# Patient Record
Sex: FEMALE | Race: BLACK OR AFRICAN AMERICAN | NOT HISPANIC OR LATINO | Employment: STUDENT | URBAN - METROPOLITAN AREA
[De-identification: names, ages, dates, MRNs, and addresses within clinical notes are randomized per-mention and may not be internally consistent; named-entity substitution may affect disease eponyms.]

---

## 2020-08-21 ENCOUNTER — TELEPHONE (OUTPATIENT)
Dept: PEDIATRIC GASTROENTEROLOGY | Facility: CLINIC | Age: 20
End: 2020-08-21

## 2020-08-21 ENCOUNTER — OFFICE VISIT (OUTPATIENT)
Dept: PEDIATRICS | Facility: CLINIC | Age: 20
End: 2020-08-21
Payer: MEDICAID

## 2020-08-21 VITALS
TEMPERATURE: 98 F | HEART RATE: 81 BPM | RESPIRATION RATE: 18 BRPM | HEIGHT: 65 IN | WEIGHT: 135 LBS | OXYGEN SATURATION: 99 % | DIASTOLIC BLOOD PRESSURE: 60 MMHG | BODY MASS INDEX: 22.49 KG/M2 | SYSTOLIC BLOOD PRESSURE: 102 MMHG

## 2020-08-21 DIAGNOSIS — K21.9 GASTROESOPHAGEAL REFLUX DISEASE WITHOUT ESOPHAGITIS: ICD-10-CM

## 2020-08-21 DIAGNOSIS — Z00.00 ANNUAL PHYSICAL EXAM: ICD-10-CM

## 2020-08-21 DIAGNOSIS — F84.0 AUTISM: ICD-10-CM

## 2020-08-21 DIAGNOSIS — K59.09 CHRONIC CONSTIPATION: ICD-10-CM

## 2020-08-21 PROCEDURE — 99385 PR PREVENTIVE VISIT,NEW,18-39: ICD-10-PCS | Mod: S$GLB,,, | Performed by: INTERNAL MEDICINE

## 2020-08-21 PROCEDURE — 99385 PREV VISIT NEW AGE 18-39: CPT | Mod: S$GLB,,, | Performed by: INTERNAL MEDICINE

## 2020-08-21 RX ORDER — TRIPROLIDINE/PSEUDOEPHEDRINE 2.5MG-60MG
15 TABLET ORAL EVERY 6 HOURS PRN
COMMUNITY

## 2020-08-21 RX ORDER — POLYETHYLENE GLYCOL 3350 17 G/17G
17 POWDER, FOR SOLUTION ORAL
COMMUNITY

## 2020-08-21 RX ORDER — DIPHENHYDRAMINE HCL 12.5MG/5ML
LIQUID (ML) ORAL 4 TIMES DAILY PRN
COMMUNITY

## 2020-08-21 RX ORDER — OMEPRAZOLE 20 MG/1
20 CAPSULE, DELAYED RELEASE ORAL DAILY
COMMUNITY
End: 2020-08-21 | Stop reason: SDUPTHER

## 2020-08-21 RX ORDER — OMEPRAZOLE 20 MG/1
20 CAPSULE, DELAYED RELEASE ORAL DAILY
Qty: 30 CAPSULE | Refills: 11 | Status: SHIPPED | OUTPATIENT
Start: 2020-08-21 | End: 2022-08-26

## 2020-08-21 NOTE — PROGRESS NOTES
Well Adolescent Visit    Chief Complaint   Patient presents with    Well Child       19-year-old young woman with a history of autism, GERD, recurrent vomiting here to establish care and for annual physical.  Patient has previous pediatrician retired.  Patient was diagnosed with autism at around 3 years of age.  When she was younger, she received speech therapy, occupational therapy, behavioral therapy through school.  She is currently in college pursuing a degree in graphic design.    Over the years, she has had issues with loud noises, this has improved over time and patient knows to carry a pair of noise cancelling headphones with her in case she has and allowed place.  She also does not do well with any major life changes or changes to her routine.  During these times, she becomes anxious and will often have episodes of vomiting.  A few years ago, mom noted that patient complained of GERD like symptoms even between episodes so she started treating her with omeprazole.  She does not take it daily but she will take it whenever she is having 1 of these episodes with frequent vomiting.  Recently, mom had surgery and dad, who is a , was home more than usual.  Likely due to these changes, patient started having issues with frequent vomiting again.  Mom has not been giving her omeprazole daily.    Well Child  Associated symptoms include vomiting. Pertinent negatives include no abdominal pain, arthralgias, chest pain, congestion, coughing, fatigue, headaches, joint swelling, nausea, rash, sore throat or weakness.         There is no immunization history on file for this patient.    Past Medical History:   Diagnosis Date    History of speech therapy        Family History   Problem Relation Age of Onset    No Known Problems Mother     No Known Problems Father        Social History     Socioeconomic History    Marital status: Single     Spouse name: Not on file    Number of children: Not on file     Years of education: Not on file    Highest education level: Not on file   Occupational History    Not on file   Social Needs    Financial resource strain: Not on file    Food insecurity     Worry: Not on file     Inability: Not on file    Transportation needs     Medical: Not on file     Non-medical: Not on file   Tobacco Use    Smoking status: Never Smoker    Smokeless tobacco: Never Used   Substance and Sexual Activity    Alcohol use: Not on file    Drug use: Not on file    Sexual activity: Not on file   Lifestyle    Physical activity     Days per week: Not on file     Minutes per session: Not on file    Stress: Not on file   Relationships    Social connections     Talks on phone: Not on file     Gets together: Not on file     Attends Orthodox service: Not on file     Active member of club or organization: Not on file     Attends meetings of clubs or organizations: Not on file     Relationship status: Not on file   Other Topics Concern    Not on file   Social History Narrative    Not on file       Review of Systems   Constitutional: Positive for appetite change. Negative for activity change, fatigue and unexpected weight change.   HENT: Negative for congestion, ear pain, rhinorrhea, sinus pressure, sinus pain, sore throat and trouble swallowing.    Eyes: Negative for pain, redness and visual disturbance.   Respiratory: Negative for cough, chest tightness, shortness of breath and wheezing.    Cardiovascular: Negative for chest pain and palpitations.   Gastrointestinal: Positive for vomiting. Negative for abdominal pain, constipation, diarrhea and nausea.   Genitourinary: Negative for difficulty urinating, dysuria, flank pain, frequency, pelvic pain, vaginal bleeding and vaginal discharge.   Musculoskeletal: Negative for arthralgias and joint swelling.   Skin: Negative for rash.   Neurological: Negative for dizziness, seizures, syncope, weakness and headaches.   Psychiatric/Behavioral: Negative for  "confusion, dysphoric mood, sleep disturbance and suicidal ideas. The patient is nervous/anxious.        Vitals:    08/21/20 1354   BP: 102/60   Pulse: 81   Resp: 18   Temp: 97.6 °F (36.4 °C)   TempSrc: Temporal   SpO2: 99%   Weight: 61.2 kg (135 lb)   Height: 5' 5" (1.651 m)       Percentiles:   62 %ile (Z= 0.30) based on CDC (Girls, 2-20 Years) weight-for-age data using vitals from 8/21/2020.   61 %ile (Z= 0.27) based on CDC (Girls, 2-20 Years) Stature-for-age data based on Stature recorded on 8/21/2020.   No previous contact with both weight and height data on file.   Blood pressure percentiles are not available for patients who are 18 years or older.        Physical Exam  Constitutional:       General: She is not in acute distress.     Appearance: She is well-developed.   HENT:      Head: Normocephalic and atraumatic.      Nose: Nose normal.   Eyes:      Conjunctiva/sclera: Conjunctivae normal.      Pupils: Pupils are equal, round, and reactive to light.   Neck:      Musculoskeletal: Normal range of motion and neck supple.      Thyroid: No thyromegaly.   Cardiovascular:      Rate and Rhythm: Normal rate and regular rhythm.      Heart sounds: Normal heart sounds. No murmur.   Pulmonary:      Effort: Pulmonary effort is normal.      Breath sounds: Normal breath sounds.   Abdominal:      General: Bowel sounds are normal. There is no distension.      Palpations: Abdomen is soft. There is no mass.      Tenderness: There is no abdominal tenderness. There is no guarding.   Lymphadenopathy:      Cervical: No cervical adenopathy.   Skin:     General: Skin is warm and dry.   Neurological:      Mental Status: She is alert and oriented to person, place, and time.         Assessment/Plan:    Arti is a 19 y.o. here for well adolescent visit.  Growth and development are not within normal limits.  Concerns addressed an anticipatory guidance given as below.      Problem List Items Addressed This Visit        Neuro    Autism    " Relevant Orders    Ambulatory referral/consult to Pediatric Gastroenterology       GI    Gastroesophageal reflux disease without esophagitis    Current Assessment & Plan     Suspect patient has chronic GERD as well as vomiting episodes that are more behavioral in nature.  I have advised daily PPI and I am referring to GI for EGD to evaluate for any esophageal damage due to chronic acid reflux.         Relevant Medications    omeprazole (PRILOSEC) 20 MG capsule    Other Relevant Orders    Ambulatory referral/consult to Pediatric Gastroenterology    Chronic constipation    Current Assessment & Plan     Well controlled with MiraLax.         Relevant Medications    polyethylene glycol (GLYCOLAX) 17 gram/dose powder       Other    Annual physical exam          Anticipatory guidance:  Good nutrition/Exercise  Dental/Flossing/Self care  Drowning/Sun safety  Seat belt/Auto safety  Sport bike/Helmet use  Sports/Injury prevention  Violence prevention/Gun safety  Passive smoke  Parenting advice  Safe at home  Sexual education/Counseling  Education goals/Activities  Limit TV/Internet use  Tobacco/Alcohol/Drugs/Inhalants  Peer refusal skills/Gangs  Social interaction  Family functioning  Self control  Depression/Anxiety  Conflict resolution skills

## 2020-08-21 NOTE — TELEPHONE ENCOUNTER
Referral received from Dr. Angel for pt to see peds GI, but pt is a 18 y/o New Patient which would warrant establishing care with adult GI.  Called parents to discuss, no answer, LVM.

## 2020-08-22 PROBLEM — K59.09 CHRONIC CONSTIPATION: Status: ACTIVE | Noted: 2020-08-22

## 2020-08-22 NOTE — ASSESSMENT & PLAN NOTE
Suspect patient has chronic GERD as well as vomiting episodes that are more behavioral in nature.  I have advised daily PPI and I am referring to GI for EGD to evaluate for any esophageal damage due to chronic acid reflux.

## 2020-09-14 ENCOUNTER — TELEPHONE (OUTPATIENT)
Dept: PEDIATRICS | Facility: CLINIC | Age: 20
End: 2020-09-14

## 2020-09-14 DIAGNOSIS — K59.09 CHRONIC CONSTIPATION: ICD-10-CM

## 2020-09-14 DIAGNOSIS — K21.9 GASTROESOPHAGEAL REFLUX DISEASE WITHOUT ESOPHAGITIS: Primary | ICD-10-CM

## 2020-09-14 NOTE — TELEPHONE ENCOUNTER
Please let mom know that Dr. Menjivar would prefer to have Alanna see an adult GI doctor given her age, but if mom really wants to see pediatric GI he will see her once to get things started.  Mom had also asked me if I had put in a referral to Dr. Menjivar for her other daughter and I did.     Mom notified. Mom wants to know who do you want her to see and can you put in a referral?

## 2020-09-14 NOTE — TELEPHONE ENCOUNTER
----- Message from Danielle Angel MD sent at 9/14/2020 12:12 PM CDT -----  Regarding: FW: referral  Please let mom know that Dr. Menjivar would prefer to have Alanna see an adult GI doctor given her age, but if mom really wants to see pediatric GI he will see her once to get things started.  Mom had also asked me if I had put in a referral to Dr. Menjivar for her other daughter and I did.   ----- Message -----  From: Jalen Menjivar MD  Sent: 9/9/2020   5:06 PM CDT  To: Enedelia Kurtz, RN, Danielle Angel MD  Subject: RE: referral                                     The main issue is this is not likely to be a 1 time visit an almost 20-year-old patient-though delayed and with Autism.  I think it would be better to establish with an adult provider since I would need to transition soon anyway if possible.  I can certainly see with that understanding since that may help to get things rolling.  I do not have a specific provider to transition afterwards.  Thanks  ----- Message -----  From: Danielle Angel MD  Sent: 9/9/2020   4:56 PM CDT  To: Jalen Menjivar MD, Enedelia Kurtz, RN  Subject: referral                                         Dr. Menjivar,   I have referred this patient to you even though she is 19 for 2 reasons- First, she is autistic and her GI issues are typical for autistic patients (constipation, vomiting that is related to anxiety). Also I referred her younger sister to you so mom was hoping both girls could see the same GI doctor.  Please let me know if you would prefer that I refer her to an adult provider, or if you have a PD GI colleague who you think would be a good fit for this patient.    Thanks!    Danielle Angel

## 2020-10-09 DIAGNOSIS — E73.9 LACTOSE INTOLERANCE: ICD-10-CM

## 2020-10-09 DIAGNOSIS — K21.9 GERD (GASTROESOPHAGEAL REFLUX DISEASE): Primary | ICD-10-CM

## 2020-10-09 DIAGNOSIS — E74.39: ICD-10-CM

## 2020-10-09 DIAGNOSIS — R14.1 GAS PAIN: ICD-10-CM

## 2020-10-09 DIAGNOSIS — R11.2 NAUSEA WITH VOMITING: ICD-10-CM

## 2020-10-15 ENCOUNTER — CLINICAL SUPPORT (OUTPATIENT)
Dept: PEDIATRICS | Facility: CLINIC | Age: 20
End: 2020-10-15
Payer: MEDICAID

## 2020-10-15 VITALS — TEMPERATURE: 98 F

## 2020-10-15 PROCEDURE — 90471 IMMUNIZATION ADMIN: CPT | Mod: S$GLB,VFC,, | Performed by: PEDIATRICS

## 2020-10-15 PROCEDURE — 90686 FLU VACCINE (QUAD) GREATER THAN OR EQUAL TO 3YO PRESERVATIVE FREE IM: ICD-10-PCS | Mod: SL,S$GLB,, | Performed by: PEDIATRICS

## 2020-10-15 PROCEDURE — 90686 IIV4 VACC NO PRSV 0.5 ML IM: CPT | Mod: SL,S$GLB,, | Performed by: PEDIATRICS

## 2020-10-15 PROCEDURE — 90471 FLU VACCINE (QUAD) GREATER THAN OR EQUAL TO 3YO PRESERVATIVE FREE IM: ICD-10-PCS | Mod: S$GLB,VFC,, | Performed by: PEDIATRICS

## 2020-11-20 ENCOUNTER — OFFICE VISIT (OUTPATIENT)
Dept: PEDIATRICS | Facility: CLINIC | Age: 20
End: 2020-11-20
Payer: MEDICAID

## 2020-11-20 VITALS
WEIGHT: 132.38 LBS | HEART RATE: 80 BPM | SYSTOLIC BLOOD PRESSURE: 96 MMHG | OXYGEN SATURATION: 99 % | RESPIRATION RATE: 20 BRPM | HEIGHT: 65 IN | BODY MASS INDEX: 22.06 KG/M2 | DIASTOLIC BLOOD PRESSURE: 52 MMHG

## 2020-11-20 DIAGNOSIS — L70.9 ACNE, UNSPECIFIED ACNE TYPE: ICD-10-CM

## 2020-11-20 DIAGNOSIS — K29.30 SUPERFICIAL GASTRITIS WITHOUT HEMORRHAGE, UNSPECIFIED CHRONICITY: ICD-10-CM

## 2020-11-20 PROCEDURE — 99214 PR OFFICE/OUTPT VISIT, EST, LEVL IV, 30-39 MIN: ICD-10-PCS | Mod: S$GLB,,, | Performed by: INTERNAL MEDICINE

## 2020-11-20 PROCEDURE — 99214 OFFICE O/P EST MOD 30 MIN: CPT | Mod: S$GLB,,, | Performed by: INTERNAL MEDICINE

## 2020-11-20 RX ORDER — CHOLECALCIFEROL (VITAMIN D3) 125 MCG
1 CAPSULE ORAL
COMMUNITY

## 2020-11-20 NOTE — PROGRESS NOTES
ADULT FOLLOW-UP VISIT    Subjective:     Chief Complaint:  Follow-up      20-year-old young woman with history of autism here for follow-up of recurrent episodes of vomiting.  Patient was seen by GI, last week she had an EGD which showed gastritis.  Pathology is still pending.  Esophagus was normal.  Patient reports improvement in her symptoms on omeprazole.  She has an upcoming follow-up with her GI doctor for full results.  Mom would like me to take look at patient's skin.  She suffers with acne on her face, chest, back.        Ms. Angel  has a past medical history of History of speech therapy.    Family history and social history are reviewed and there are no significant changes.     ROS:  Review of Systems   Constitutional: Negative for activity change, appetite change, fatigue and unexpected weight change.   HENT: Negative for congestion, ear pain, rhinorrhea, sinus pressure, sinus pain, sore throat and trouble swallowing.    Respiratory: Negative for cough, chest tightness, shortness of breath and wheezing.    Cardiovascular: Negative for chest pain and palpitations.   Gastrointestinal: Negative for abdominal pain, constipation, diarrhea, nausea and vomiting.   Genitourinary: Negative for dysuria.   Skin: Positive for rash.   Neurological: Negative for dizziness, seizures, syncope, weakness and headaches.   Psychiatric/Behavioral: Negative for dysphoric mood and suicidal ideas. The patient is not nervous/anxious.           Current Outpatient Medications:     lactase (LACTAID) 3,000 unit tablet, Take 1 tablet by mouth 3 (three) times daily with meals., Disp: , Rfl:     Lactobacillus acidophilus (PROBIOTIC ACIDOPHILUS ORAL), Take by mouth., Disp: , Rfl:     multivitamin with minerals tablet, Take 1 tablet by mouth once daily., Disp: , Rfl:     omeprazole (PRILOSEC) 20 MG capsule, Take 1 capsule (20 mg total) by mouth once daily., Disp: 30 capsule, Rfl: 11     "polyethylene glycol (GLYCOLAX) 17 gram/dose powder, Take 17 g by mouth once daily., Disp: , Rfl:     diphenhydrAMINE (BENYLIN) 12.5 mg/5 mL liquid, Take by mouth 4 (four) times daily as needed for Allergies., Disp: , Rfl:     ibuprofen (ADVIL,MOTRIN) 100 mg/5 mL suspension, Take 15 mLs by mouth every 6 (six) hours as needed for Temperature greater than., Disp: , Rfl:       Objective:     Physical Examination:     BP (!) 96/52   Pulse 80   Resp 20   Ht 5' 5" (1.651 m)   Wt 60 kg (132 lb 6 oz)   SpO2 99%   BMI 22.03 kg/m²     Physical Exam  Constitutional:       General: She is not in acute distress.     Appearance: She is well-developed. She is not diaphoretic.   HENT:      Right Ear: External ear normal.      Left Ear: External ear normal.      Nose: Nose normal.   Eyes:      General:         Right eye: No discharge.         Left eye: No discharge.      Conjunctiva/sclera: Conjunctivae normal.      Pupils: Pupils are equal, round, and reactive to light.   Cardiovascular:      Rate and Rhythm: Normal rate and regular rhythm.      Heart sounds: Normal heart sounds. No murmur.   Pulmonary:      Effort: Pulmonary effort is normal. No respiratory distress.      Breath sounds: Normal breath sounds. No wheezing or rales.   Abdominal:      General: Abdomen is flat. Bowel sounds are normal. There is no distension.      Tenderness: There is no abdominal tenderness.   Skin:     Comments: Acne on face, chest, back, arms   Neurological:      Mental Status: She is alert and oriented to person, place, and time.         Assessment/Plan:   Arti is a 20 y.o. female here for follow-up.    Problem List Items Addressed This Visit        Derm    Acne    Current Assessment & Plan     Handout given on initial management. Referred to derm as pt will likely need systemic therapy for widespread acne.          Relevant Orders    Ambulatory referral/consult to Dermatology       GI    Superficial gastritis without hemorrhage    Current " Assessment & Plan     Continue omeprazole. F/u with GI for path, H pylori results.                Health Maintenance   Topic Date Due    Hepatitis C Screening  2000    Lipid Panel  2000    HPV Vaccines (1 - 2-dose series) 09/24/2011    TETANUS VACCINE  09/24/2018           Discussion:     No follow-ups on file.    Goals    None         Electronically signed by Danielle Angel

## 2020-11-20 NOTE — ASSESSMENT & PLAN NOTE
Handout given on initial management. Referred to derm as pt will likely need systemic therapy for widespread acne.

## 2020-11-23 PROBLEM — Z00.00 ANNUAL PHYSICAL EXAM: Status: RESOLVED | Noted: 2020-08-21 | Resolved: 2020-11-23

## 2021-07-14 ENCOUNTER — OFFICE VISIT (OUTPATIENT)
Dept: PEDIATRICS | Facility: CLINIC | Age: 21
End: 2021-07-14
Payer: MEDICAID

## 2021-07-14 VITALS
DIASTOLIC BLOOD PRESSURE: 62 MMHG | OXYGEN SATURATION: 98 % | WEIGHT: 134 LBS | HEIGHT: 65 IN | TEMPERATURE: 98 F | RESPIRATION RATE: 18 BRPM | SYSTOLIC BLOOD PRESSURE: 98 MMHG | HEART RATE: 65 BPM | BODY MASS INDEX: 22.33 KG/M2

## 2021-07-14 DIAGNOSIS — Z02.89 OTHER GENERAL MEDICAL EXAMINATION FOR ADMINISTRATIVE PURPOSES: ICD-10-CM

## 2021-07-14 DIAGNOSIS — K21.9 GASTROESOPHAGEAL REFLUX DISEASE WITHOUT ESOPHAGITIS: ICD-10-CM

## 2021-07-14 DIAGNOSIS — L70.9 ACNE, UNSPECIFIED ACNE TYPE: ICD-10-CM

## 2021-07-14 DIAGNOSIS — F84.0 AUTISM: Primary | ICD-10-CM

## 2021-07-14 PROCEDURE — 99214 OFFICE O/P EST MOD 30 MIN: CPT | Mod: S$GLB,,, | Performed by: INTERNAL MEDICINE

## 2021-07-14 PROCEDURE — 99214 PR OFFICE/OUTPT VISIT, EST, LEVL IV, 30-39 MIN: ICD-10-PCS | Mod: S$GLB,,, | Performed by: INTERNAL MEDICINE

## 2021-07-14 RX ORDER — METRONIDAZOLE 7.5 MG/G
CREAM TOPICAL 2 TIMES DAILY
COMMUNITY

## 2021-07-14 RX ORDER — MINOCYCLINE HYDROCHLORIDE 100 MG/1
100 CAPSULE ORAL DAILY
COMMUNITY
End: 2022-04-08

## 2021-07-15 PROBLEM — Z02.89 OTHER GENERAL MEDICAL EXAMINATION FOR ADMINISTRATIVE PURPOSES: Status: ACTIVE | Noted: 2021-07-15

## 2021-12-10 ENCOUNTER — TELEPHONE (OUTPATIENT)
Dept: PEDIATRICS | Facility: CLINIC | Age: 21
End: 2021-12-10
Payer: MEDICAID

## 2021-12-10 DIAGNOSIS — S99.921A INJURY OF TOE ON RIGHT FOOT, INITIAL ENCOUNTER: Primary | ICD-10-CM

## 2021-12-13 ENCOUNTER — OFFICE VISIT (OUTPATIENT)
Dept: PODIATRY | Facility: CLINIC | Age: 21
End: 2021-12-13
Payer: MEDICAID

## 2021-12-13 ENCOUNTER — HOSPITAL ENCOUNTER (OUTPATIENT)
Dept: RADIOLOGY | Facility: CLINIC | Age: 21
Discharge: HOME | End: 2021-12-13
Attending: PODIATRIST
Payer: MEDICAID

## 2021-12-13 VITALS — HEART RATE: 73 BPM | OXYGEN SATURATION: 98 % | HEIGHT: 65 IN | BODY MASS INDEX: 21.99 KG/M2 | WEIGHT: 132 LBS

## 2021-12-13 DIAGNOSIS — L60.0 INGROWN NAIL: ICD-10-CM

## 2021-12-13 DIAGNOSIS — M79.674 TOE PAIN, RIGHT: ICD-10-CM

## 2021-12-13 DIAGNOSIS — S99.921A INJURY OF TOE ON RIGHT FOOT, INITIAL ENCOUNTER: Primary | ICD-10-CM

## 2021-12-13 PROCEDURE — 73630 XR FOOT COMPLETE 3 VIEW RIGHT: ICD-10-PCS | Mod: RT,S$GLB,, | Performed by: RADIOLOGY

## 2021-12-13 PROCEDURE — 73630 X-RAY EXAM OF FOOT: CPT | Mod: RT,S$GLB,, | Performed by: RADIOLOGY

## 2021-12-13 PROCEDURE — 99203 OFFICE O/P NEW LOW 30 MIN: CPT | Mod: S$GLB,,, | Performed by: PODIATRIST

## 2021-12-13 PROCEDURE — 99203 PR OFFICE/OUTPT VISIT, NEW, LEVL III, 30-44 MIN: ICD-10-PCS | Mod: S$GLB,,, | Performed by: PODIATRIST

## 2021-12-13 RX ORDER — SULFAMETHOXAZOLE AND TRIMETHOPRIM 800; 160 MG/1; MG/1
1 TABLET ORAL 2 TIMES DAILY
Qty: 14 TABLET | Refills: 0 | Status: SHIPPED | OUTPATIENT
Start: 2021-12-13 | End: 2021-12-20

## 2022-01-06 ENCOUNTER — OFFICE VISIT (OUTPATIENT)
Dept: PODIATRY | Facility: CLINIC | Age: 22
End: 2022-01-06
Payer: MEDICAID

## 2022-01-06 VITALS — WEIGHT: 132 LBS | BODY MASS INDEX: 21.99 KG/M2 | OXYGEN SATURATION: 99 % | HEIGHT: 65 IN | RESPIRATION RATE: 18 BRPM

## 2022-01-06 DIAGNOSIS — L60.0 INGROWN NAIL: Primary | ICD-10-CM

## 2022-01-06 DIAGNOSIS — M79.674 TOE PAIN, RIGHT: ICD-10-CM

## 2022-01-06 PROCEDURE — 99213 PR OFFICE/OUTPT VISIT, EST, LEVL III, 20-29 MIN: ICD-10-PCS | Mod: 25,S$GLB,, | Performed by: PODIATRIST

## 2022-01-06 PROCEDURE — 1159F PR MEDICATION LIST DOCUMENTED IN MEDICAL RECORD: ICD-10-PCS | Mod: CPTII,S$GLB,, | Performed by: PODIATRIST

## 2022-01-06 PROCEDURE — 1160F PR REVIEW ALL MEDS BY PRESCRIBER/CLIN PHARMACIST DOCUMENTED: ICD-10-PCS | Mod: CPTII,S$GLB,, | Performed by: PODIATRIST

## 2022-01-06 PROCEDURE — 1160F RVW MEDS BY RX/DR IN RCRD: CPT | Mod: CPTII,S$GLB,, | Performed by: PODIATRIST

## 2022-01-06 PROCEDURE — 3008F BODY MASS INDEX DOCD: CPT | Mod: CPTII,S$GLB,, | Performed by: PODIATRIST

## 2022-01-06 PROCEDURE — 3008F PR BODY MASS INDEX (BMI) DOCUMENTED: ICD-10-PCS | Mod: CPTII,S$GLB,, | Performed by: PODIATRIST

## 2022-01-06 PROCEDURE — 99213 OFFICE O/P EST LOW 20 MIN: CPT | Mod: 25,S$GLB,, | Performed by: PODIATRIST

## 2022-01-06 PROCEDURE — 1159F MED LIST DOCD IN RCRD: CPT | Mod: CPTII,S$GLB,, | Performed by: PODIATRIST

## 2022-01-06 PROCEDURE — 11750 NAIL REMOVAL: ICD-10-PCS | Mod: T5,S$GLB,, | Performed by: PODIATRIST

## 2022-01-06 PROCEDURE — 11750 EXCISION NAIL&NAIL MATRIX: CPT | Mod: T5,S$GLB,, | Performed by: PODIATRIST

## 2022-01-06 NOTE — PROGRESS NOTES
"  [address]  [place] [address]  Phone: [phone]   [phone]    Patient's PCP:Danielle Angel MD  Referring Provider: No ref. provider found    Subjective:      Chief Complaint:: Ingrown Toenail (Right great toe medial border ingrown)    JAVED Hammer is a 21 y.o. female who presents with a complaint of right great toe medial border ingrown lasting for two months. Onset of symptoms hitting toe on door frame and injuring nail and reports trauma.  Current symptoms include pain, inflammation and minor draainge.  Aggravating factors are applied pressure, hitting toe and shoe gear. Symptoms have waxed and weaned. Treatment to date have included bandaging with gauze and Bactroban, oral course of antibiotics.      Vitals:    01/06/22 1647   Resp: 18   SpO2: 99%   Weight: 59.9 kg (132 lb)   Height: 5' 5" (1.651 m)      Shoe Size:     History reviewed. No pertinent surgical history.  Past Medical History:   Diagnosis Date    History of speech therapy      Family History   Problem Relation Age of Onset    No Known Problems Mother     No Known Problems Father         Social History:   Marital Status: Single  Alcohol History:  has no history on file for alcohol use.  Tobacco History:  reports that she has never smoked. She has never used smokeless tobacco.  Drug History:  has no history on file for drug use.    Review of patient's allergies indicates:   Allergen Reactions    Egg derived      Egg white    Lactose        Current Outpatient Medications   Medication Sig Dispense Refill    ibuprofen (ADVIL,MOTRIN) 100 mg/5 mL suspension Take 15 mLs by mouth every 6 (six) hours as needed for Temperature greater than.      diphenhydrAMINE (BENYLIN) 12.5 mg/5 mL liquid Take by mouth 4 (four) times daily as needed for Allergies.      lactase (LACTAID) 3,000 unit tablet Take 1 tablet by mouth 3 (three) times daily with meals.      Lactobacillus acidophilus (PROBIOTIC ACIDOPHILUS ORAL) " Take by mouth.      metronidazole 0.75% (METROCREAM) 0.75 % Crea Apply topically 2 (two) times daily.      minocycline (MINOCIN,DYNACIN) 100 MG capsule Take 100 mg by mouth once daily.      multivitamin with minerals tablet Take 1 tablet by mouth once daily.      omeprazole (PRILOSEC) 20 MG capsule Take 1 capsule (20 mg total) by mouth once daily. 30 capsule 11    polyethylene glycol (GLYCOLAX) 17 gram/dose powder Take 17 g by mouth once daily.      UNABLE TO FIND medication name: Compound cream [id number]       No current facility-administered medications for this visit.       Review of Systems   Constitutional: Negative for chills, fatigue, fever and unexpected weight change.   HENT: Negative for hearing loss and trouble swallowing.    Eyes: Negative for photophobia and visual disturbance.   Respiratory: Negative for cough, shortness of breath and wheezing.    Cardiovascular: Negative for chest pain, palpitations and leg swelling.   Gastrointestinal: Negative for abdominal pain and nausea.   Genitourinary: Negative for dysuria and frequency.   Musculoskeletal: Negative for arthralgias, back pain, gait problem and joint swelling.   Skin: Positive for color change. Negative for rash and wound.   Neurological: Negative for tremors, seizures, weakness, numbness and headaches.   Hematological: Does not bruise/bleed easily.         Objective:        Physical Exam:   Foot Exam    General  General Appearance: appears stated age and healthy   Orientation: alert and oriented to person, place, and time   Affect: appropriate   Gait: unimpaired       Right Foot/Ankle     Inspection and Palpation  Ecchymosis: none  Tenderness: (Medial lateral borders great toenail )  Swelling: (Mild edema medial border great toenail)  Arch: normal  Skin Exam: abnormal color; no drainage and no ulcer   Neurovascular  Dorsalis pedis: 2+  Posterior tibial: 2+  Capillary Refill: 2+  Varicose veins: not present  Saphenous nerve  sensation: normal  Tibial nerve sensation: normal  Superficial peroneal nerve sensation: normal  Deep peroneal nerve sensation: normal  Sural nerve sensation: normal    Edema  Type of edema: non-pitting    Muscle Strength  Ankle dorsiflexion: 5  Ankle plantar flexion: 5  Ankle inversion: 5  Ankle eversion: 5  Great toe extension: 5  Great toe flexion: 5    Range of Motion    Normal right ankle ROM    Tests  Anterior drawer: negative   Talar tilt: negative   PT Tinel's sign: negative    Paresthesia: negative  Comments  Ingrown medial border great toenail.  Tender to palpation.  Mild edema and erythema are present.  No purulence.        Physical Exam  Cardiovascular:      Pulses:           Dorsalis pedis pulses are 2+ on the right side.        Posterior tibial pulses are 2+ on the right side.   Feet:      Right foot:      Skin integrity: No ulcer.         Imaging:  None           Assessment:       1. Ingrown nail    2. Toe pain, right      Plan:   Ingrown nail    Toe pain, right      Follow up if symptoms worsen or fail to improve.    Nail Removal    Date/Time: 1/6/2022 3:50 PM  Performed by: Collin Diane DPM  Authorized by: Collin Diane DPM     Consent Done?:  Yes (Written)    Location:  Right foot  Location detail:  Right big toe  Anesthesia:  Local infiltration  Local anesthetic: lidocaine 2% without epinephrine  Preparation:  Skin prepped with alcohol    Amount removed:  Partial  Wedge excision of skin of nail fold: No    Nail bed sutured?: No    Nail matrix removed:  Partial  Dressing applied:  4x4  Patient tolerance:  Patient tolerated the procedure well with no immediate complications     Medial border            Counseling:     I provided patient education verbally regarding:   Patient diagnosis, treatment options, as well as alternatives, risks, and benefits.     Ingrown toenail treatment options of no treatment, avulsion of nail border under local with regrowth of nail, chemical matrixectomy for attempted  permanent correction of the problem. Patient was educated about daily dressing changes, soaks, and medications following removal of the nail.       This note was created using Dragon voice recognition software that occasionally misinterpreted phrases or words.

## 2022-01-06 NOTE — PATIENT INSTRUCTIONS
Understanding Ingrown Toenails    An ingrown nail is the result of a nail growing into the skin that surrounds it. This often occurs at either edge of the big toe. Ingrown nails may be caused by improper trimming, inherited nail deformities, injuries, fungal infections, or pressure.    Symptoms  Ingrown nails may cause pain at the tip of the toe or all the way to the base of the toe. The pain is often worse while walking. An ingrown nail may also lead to infection, inflammation, or a more serious condition. If its infected, you might see pus or redness.    Evaluation  To determine the extent of your problem, your healthcare provider examines and possibly presses the painful area. If other problems are suspected, blood tests, cultures, and X-rays may be done as well.    Treatment  If the nail isnt infected, your healthcare provider may trim the corner of it to help relieve your symptoms. He or she may need to remove one side of your nail back to the cuticle. The base of the nail may then be treated with a chemical to keep the ingrown part from growing back. Severe infections or ingrown nails may require antibiotics and temporary or permanent removal of a portion of the nail. To prevent pain, a local anesthetic may be used in these procedures. This treatment is usually done at your healthcare provider's office.    Prevention  Many nail problems can be prevented by wearing the right shoes and trimming your nails properly. To help avoid infection, keep your feet clean and dry. If you have diabetes, talk with your healthcare provider before doing any foot self-care.  · The right shoes: Get your feet measured (your size may change as you age). Wear shoes that are supportive and roomy enough for your toes to wiggle. Look for shoes made of natural materials such as leather, which allow your feet to breathe.  · Proper trimming: To avoid problems, trim your toenails straight across without cutting down into the corners. If  you cant trim your own nails, ask your healthcare provider to do so for you.    Date Last Reviewed: 10/1/2016  © 3636-8401 [employer]. [address] [place] [address], [place] [address]. All rights reserved. This information is not intended as a substitute for professional medical care. Always follow your healthcare professional's instructions.        INSTRUCTIONS FOLLOWING CORRECTIVE NAIL SURGERY TODAY     Go directly home and elevate foot.   Restrict your activities the remainder of the day.   Apply ice pack if needed.   Keep bandages clean and dry.   You may notice some oozing coming through the bandages; this is normal.  Do not remove the dressing, apply additional dressing on top should you need to.  If bandage becomes saturated with blood, call us.   Local anesthesia will last 3-6 hours.   For discomfort, take Advil, Tylenol or pain medication if prescribed.   If you were given antibiotics, take them until they are all gone. It is important to finish the antibiotics even if the wound looks better. This ensures that the infection clears.      TOMORROW     When you take your shower, get dressing saturated then remove the dressing so that the dressing does not pull or stick to the toe.   Dry area.   Apply Neosporin and gauze bandage.  No Band-Aid   Re-bandage twice daily for two weeks until next appointment.   If any problems arise, call the office. We do have a 24 hours answering service.     If you had a procedure with phenol, it is normal for your toe to drain and have redness for 4-6 weeks.  Any redness or streaks going up the foot is NOT normal.     Your post-operative appointment in two weeks is not included in today's charges.  You will be expected to pay any co-payment or deductible.

## 2022-04-04 ENCOUNTER — PATIENT MESSAGE (OUTPATIENT)
Dept: PODIATRY | Facility: CLINIC | Age: 22
End: 2022-04-04
Payer: MEDICAID

## 2022-04-08 ENCOUNTER — OFFICE VISIT (OUTPATIENT)
Dept: PODIATRY | Facility: CLINIC | Age: 22
End: 2022-04-08
Payer: MEDICAID

## 2022-04-08 VITALS — HEART RATE: 72 BPM | OXYGEN SATURATION: 99 % | WEIGHT: 126 LBS | BODY MASS INDEX: 20.99 KG/M2 | HEIGHT: 65 IN

## 2022-04-08 DIAGNOSIS — L60.0 INGROWN NAIL: Primary | ICD-10-CM

## 2022-04-08 DIAGNOSIS — M79.674 TOE PAIN, RIGHT: ICD-10-CM

## 2022-04-08 PROCEDURE — 1160F RVW MEDS BY RX/DR IN RCRD: CPT | Mod: CPTII,S$GLB,, | Performed by: PODIATRIST

## 2022-04-08 PROCEDURE — 1159F PR MEDICATION LIST DOCUMENTED IN MEDICAL RECORD: ICD-10-PCS | Mod: CPTII,S$GLB,, | Performed by: PODIATRIST

## 2022-04-08 PROCEDURE — 99213 OFFICE O/P EST LOW 20 MIN: CPT | Mod: 25,S$GLB,, | Performed by: PODIATRIST

## 2022-04-08 PROCEDURE — 1159F MED LIST DOCD IN RCRD: CPT | Mod: CPTII,S$GLB,, | Performed by: PODIATRIST

## 2022-04-08 PROCEDURE — 11750 EXCISION NAIL&NAIL MATRIX: CPT | Mod: T5,S$GLB,, | Performed by: PODIATRIST

## 2022-04-08 PROCEDURE — 99213 PR OFFICE/OUTPT VISIT, EST, LEVL III, 20-29 MIN: ICD-10-PCS | Mod: 25,S$GLB,, | Performed by: PODIATRIST

## 2022-04-08 PROCEDURE — 3008F BODY MASS INDEX DOCD: CPT | Mod: CPTII,S$GLB,, | Performed by: PODIATRIST

## 2022-04-08 PROCEDURE — 3008F PR BODY MASS INDEX (BMI) DOCUMENTED: ICD-10-PCS | Mod: CPTII,S$GLB,, | Performed by: PODIATRIST

## 2022-04-08 PROCEDURE — 1160F PR REVIEW ALL MEDS BY PRESCRIBER/CLIN PHARMACIST DOCUMENTED: ICD-10-PCS | Mod: CPTII,S$GLB,, | Performed by: PODIATRIST

## 2022-04-08 PROCEDURE — 11750 NAIL REMOVAL: ICD-10-PCS | Mod: T5,S$GLB,, | Performed by: PODIATRIST

## 2022-04-08 NOTE — PROGRESS NOTES
"  [address] [facility] [address]. [place] [address]  Phone: [phone]   [phone]    Patient's PCP:Danielle Angel MD  Referring Provider: No ref. provider found    Subjective:      Chief Complaint:: Ingrown Toenail (Right 1st lateral border)    JAVED Hammer is a 21 y.o. female who presents today with a complaint of ingrown toenail right 1st toe lateral border lasting for about 2 weeks.  Current symptoms include pain, swelling, oozing.  Aggravating factors are pressure.  Treatment to date have included sleeves for the toes.    Vitals:    04/08/22 0901   Pulse: 72   SpO2: 99%   Weight: 57.2 kg (126 lb)   Height: 5' 5" (1.651 m)   PainSc:   2      Shoe Size: 11    History reviewed. No pertinent surgical history.  Past Medical History:   Diagnosis Date    History of speech therapy      Family History   Problem Relation Age of Onset    No Known Problems Mother     No Known Problems Father         Social History:   Marital Status: Single  Alcohol History:  has no history on file for alcohol use.  Tobacco History:  reports that she has never smoked. She has never used smokeless tobacco.  Drug History:  has no history on file for drug use.    Review of patient's allergies indicates:   Allergen Reactions    Egg derived      Egg white    Lactose        Current Outpatient Medications   Medication Sig Dispense Refill    diphenhydrAMINE (BENYLIN) 12.5 mg/5 mL liquid Take by mouth 4 (four) times daily as needed for Allergies.      ibuprofen (ADVIL,MOTRIN) 100 mg/5 mL suspension Take 15 mLs by mouth every 6 (six) hours as needed for Temperature greater than.      lactase (LACTAID) 3,000 unit tablet Take 1 tablet by mouth 3 (three) times daily with meals.      Lactobacillus acidophilus (PROBIOTIC ACIDOPHILUS ORAL) Take by mouth.      metronidazole 0.75% (METROCREAM) 0.75 % Crea Apply topically 2 (two) times daily.      multivitamin with minerals tablet Take 1 tablet by mouth once daily.      " omeprazole (PRILOSEC) 20 MG capsule Take 1 capsule (20 mg total) by mouth once daily. 30 capsule 11    polyethylene glycol (GLYCOLAX) 17 gram/dose powder Take 17 g by mouth as needed.       No current facility-administered medications for this visit.       Review of Systems   Constitutional: Negative for chills, fatigue, fever and unexpected weight change.   HENT: Negative for hearing loss and trouble swallowing.    Eyes: Negative for photophobia and visual disturbance.   Respiratory: Negative for cough, shortness of breath and wheezing.    Cardiovascular: Negative for chest pain, palpitations and leg swelling.   Gastrointestinal: Negative for abdominal pain and nausea.   Genitourinary: Negative for dysuria and frequency.   Musculoskeletal: Negative for arthralgias, back pain, gait problem, joint swelling and myalgias.   Skin: Positive for color change. Negative for rash and wound.   Neurological: Negative for tremors, seizures, weakness, numbness and headaches.   Hematological: Does not bruise/bleed easily.         Objective:        Physical Exam:   Foot Exam    General  General Appearance: appears stated age and healthy   Orientation: alert and oriented to person, place, and time   Affect: appropriate   Gait: unimpaired       Right Foot/Ankle     Inspection and Palpation  Ecchymosis: none  Tenderness: (Lateral border great toenail)  Swelling: (Lateral border great toenail )  Arch: normal  Skin Exam: erythema; no drainage and no ulcer   Neurovascular  Dorsalis pedis: 2+  Posterior tibial: 2+  Capillary Refill: 2+  Varicose veins: not present  Saphenous nerve sensation: normal  Tibial nerve sensation: normal  Superficial peroneal nerve sensation: normal  Deep peroneal nerve sensation: normal  Sural nerve sensation: normal    Edema  Type of edema: non-pitting    Muscle Strength  Ankle dorsiflexion: 5  Ankle plantar flexion: 5  Ankle inversion: 5  Ankle eversion: 5  Great toe extension: 5  Great toe flexion:  5    Range of Motion    Normal right ankle ROM    Tests  Anterior drawer: negative   Talar tilt: negative   PT Tinel's sign: negative    Paresthesia: negative  Comments  Ingrowing lateral border of the great toenail. Tender to palpation. Mild edema and erythema. No purulence.         Physical Exam  Cardiovascular:      Pulses:           Dorsalis pedis pulses are 2+ on the right side.        Posterior tibial pulses are 2+ on the right side.   Feet:      Right foot:      Skin integrity: Erythema present. No ulcer.               Right Ankle/Foot Exam     Range of Motion   The patient has normal right ankle ROM.    Comments:  Ingrowing lateral border of the great toenail. Tender to palpation. Mild edema and erythema. No purulence.         Muscle Strength   Right Lower Extremity   Ankle Dorsiflexion:  5   Plantar flexion:  5/5    Vascular Exam     Right Pulses  Dorsalis Pedis:      2+  Posterior Tibial:      2+             Imaging: none            Assessment:       1. Ingrown nail    2. Toe pain, right      Plan:   Ingrown nail    Toe pain, right      No follow-ups on file.    Nail Removal    Date/Time: 4/8/2022 8:40 AM  Performed by: Collin Diane DPM  Authorized by: Collin Diane DPM     Consent Done?:  Yes (Written)  Time out: Immediately prior to the procedure a time out was called    Prep: patient was prepped and draped in usual sterile fashion    Location:     Location:  Right foot    Location detail:  Right big toe  Anesthesia:     Anesthesia:  Local infiltration    Local anesthetic:  Lidocaine 2% without epinephrine  Procedure Details:     Preparation:  Skin prepped with alcohol    Amount removed:  Partial    Side:  Lateral    Wedge excision of skin of nail fold: No      Nail bed sutured?: No      Nail matrix removed:  Partial    Removal method:  Phenol and alcohol    Dressing applied:  4x4    Patient tolerance:  Patient tolerated the procedure well with no immediate complications     Lateral border                Counseling:     I provided patient education verbally regarding:   Patient diagnosis, treatment options, as well as alternatives, risks, and benefits.     Ingrown toenail treatment options of no treatment, avulsion of nail border under local with regrowth of nail, chemical matrixectomy for attempted permanent correction of the problem. Patient was educated about daily dressing changes, soaks, and medications following removal of the nail.       This note was created using Dragon voice recognition software that occasionally misinterpreted phrases or words.

## 2022-04-08 NOTE — PATIENT INSTRUCTIONS
Understanding Ingrown Toenails    An ingrown nail is the result of a nail growing into the skin that surrounds it. This often occurs at either edge of the big toe. Ingrown nails may be caused by improper trimming, inherited nail deformities, injuries, fungal infections, or pressure.    Symptoms  Ingrown nails may cause pain at the tip of the toe or all the way to the base of the toe. The pain is often worse while walking. An ingrown nail may also lead to infection, inflammation, or a more serious condition. If its infected, you might see pus or redness.    Evaluation  To determine the extent of your problem, your healthcare provider examines and possibly presses the painful area. If other problems are suspected, blood tests, cultures, and X-rays may be done as well.    Treatment  If the nail isnt infected, your healthcare provider may trim the corner of it to help relieve your symptoms. He or she may need to remove one side of your nail back to the cuticle. The base of the nail may then be treated with a chemical to keep the ingrown part from growing back. Severe infections or ingrown nails may require antibiotics and temporary or permanent removal of a portion of the nail. To prevent pain, a local anesthetic may be used in these procedures. This treatment is usually done at your healthcare provider's office.    Prevention  Many nail problems can be prevented by wearing the right shoes and trimming your nails properly. To help avoid infection, keep your feet clean and dry. If you have diabetes, talk with your healthcare provider before doing any foot self-care.  The right shoes: Get your feet measured (your size may change as you age). Wear shoes that are supportive and roomy enough for your toes to wiggle. Look for shoes made of natural materials such as leather, which allow your feet to breathe.  Proper trimming: To avoid problems, trim your toenails straight across without cutting down into the corners. If you  cant trim your own nails, ask your healthcare provider to do so for you.    Date Last Reviewed: 10/1/2016  © 2627-4776 [employer]. [address] [place] [address], [place] [address]. All rights reserved. This information is not intended as a substitute for professional medical care. Always follow your healthcare professional's instructions.     INSTRUCTIONS FOLLOWING CORRECTIVE NAIL SURGERY TODAY    Go directly home and elevate foot.  Restrict your activities the remainder of the day.  Apply ice pack if needed.  Keep bandages clean and dry.  You may notice some oozing coming through the bandages; this is normal.  Do not remove the dressing, apply additional dressing on top should you need to.  If bandage becomes saturated with blood, call us.  Local anesthesia will last 3-6 hours.  For discomfort, take Advil, Tylenol or pain medication if prescribed.  If you were given antibiotics, take them until they are all gone. It is important to finish the antibiotics even if the wound looks better. This ensures that the infection clears.      TOMORROW    When you take your shower, get dressing saturated then remove the dressing so that the dressing does not pull or stick to the toe and bathe as normal.  Soak in 2 Tablespoons of Epsom Salt daily for 1 hour  Pour peroxide over the toe  Dry area.  Apply Neosporin and gauze bandage.  No Band-Aid  Re-bandage twice daily for two weeks until next appointment.  If any problems arise, call the office. We do have a 24 hours answering service.    If you had a procedure with phenol, it is normal for your toe to drain and have redness for 4-6 weeks.  Any redness or streaks going up the foot is NOT normal.     Your post-operative appointment in two weeks is not included in today's charges.  You will be expected to pay any co-payment or deductible.

## 2022-08-26 ENCOUNTER — OFFICE VISIT (OUTPATIENT)
Dept: PEDIATRICS | Facility: CLINIC | Age: 22
End: 2022-08-26
Payer: MEDICAID

## 2022-08-26 VITALS
OXYGEN SATURATION: 99 % | DIASTOLIC BLOOD PRESSURE: 60 MMHG | SYSTOLIC BLOOD PRESSURE: 110 MMHG | WEIGHT: 130.38 LBS | HEART RATE: 70 BPM | HEIGHT: 65 IN | BODY MASS INDEX: 21.72 KG/M2

## 2022-08-26 DIAGNOSIS — Z71.87 COUNSELING FOR TRANSITION FROM PEDIATRIC TO ADULT CARE PROVIDER: ICD-10-CM

## 2022-08-26 DIAGNOSIS — K21.9 GASTROESOPHAGEAL REFLUX DISEASE WITHOUT ESOPHAGITIS: ICD-10-CM

## 2022-08-26 DIAGNOSIS — L70.9 ACNE, UNSPECIFIED ACNE TYPE: ICD-10-CM

## 2022-08-26 DIAGNOSIS — Z00.00 ANNUAL PHYSICAL EXAM: ICD-10-CM

## 2022-08-26 DIAGNOSIS — N63.21 MASS OF UPPER OUTER QUADRANT OF LEFT BREAST: ICD-10-CM

## 2022-08-26 DIAGNOSIS — F84.0 AUTISM: ICD-10-CM

## 2022-08-26 DIAGNOSIS — K59.09 CHRONIC CONSTIPATION: ICD-10-CM

## 2022-08-26 DIAGNOSIS — F50.89 PICA: ICD-10-CM

## 2022-08-26 PROCEDURE — 3008F BODY MASS INDEX DOCD: CPT | Mod: CPTII,S$GLB,, | Performed by: INTERNAL MEDICINE

## 2022-08-26 PROCEDURE — 99395 PR PREVENTIVE VISIT,EST,18-39: ICD-10-PCS | Mod: S$GLB,,, | Performed by: INTERNAL MEDICINE

## 2022-08-26 PROCEDURE — 3078F DIAST BP <80 MM HG: CPT | Mod: CPTII,S$GLB,, | Performed by: INTERNAL MEDICINE

## 2022-08-26 PROCEDURE — 1160F RVW MEDS BY RX/DR IN RCRD: CPT | Mod: CPTII,S$GLB,, | Performed by: INTERNAL MEDICINE

## 2022-08-26 PROCEDURE — 1159F PR MEDICATION LIST DOCUMENTED IN MEDICAL RECORD: ICD-10-PCS | Mod: CPTII,S$GLB,, | Performed by: INTERNAL MEDICINE

## 2022-08-26 PROCEDURE — 1160F PR REVIEW ALL MEDS BY PRESCRIBER/CLIN PHARMACIST DOCUMENTED: ICD-10-PCS | Mod: CPTII,S$GLB,, | Performed by: INTERNAL MEDICINE

## 2022-08-26 PROCEDURE — 3074F SYST BP LT 130 MM HG: CPT | Mod: CPTII,S$GLB,, | Performed by: INTERNAL MEDICINE

## 2022-08-26 PROCEDURE — 3074F PR MOST RECENT SYSTOLIC BLOOD PRESSURE < 130 MM HG: ICD-10-PCS | Mod: CPTII,S$GLB,, | Performed by: INTERNAL MEDICINE

## 2022-08-26 PROCEDURE — 99395 PREV VISIT EST AGE 18-39: CPT | Mod: S$GLB,,, | Performed by: INTERNAL MEDICINE

## 2022-08-26 PROCEDURE — 3078F PR MOST RECENT DIASTOLIC BLOOD PRESSURE < 80 MM HG: ICD-10-PCS | Mod: CPTII,S$GLB,, | Performed by: INTERNAL MEDICINE

## 2022-08-26 PROCEDURE — 3008F PR BODY MASS INDEX (BMI) DOCUMENTED: ICD-10-PCS | Mod: CPTII,S$GLB,, | Performed by: INTERNAL MEDICINE

## 2022-08-26 PROCEDURE — 1159F MED LIST DOCD IN RCRD: CPT | Mod: CPTII,S$GLB,, | Performed by: INTERNAL MEDICINE

## 2022-08-26 RX ORDER — CLINDAMYCIN PALMITATE HYDROCHLORIDE 75 MG/5ML
SOLUTION ORAL
COMMUNITY
Start: 2022-04-30 | End: 2022-08-26

## 2022-08-26 NOTE — PATIENT INSTRUCTIONS
You need to fast (nothing to eat or drink besides water and medications) for 8 hours before your labs are drawn.  [facility]   Address: [address] [place] [address]   Hours:   Sunday: Closed  Monday-Friday: 6AM - 5PM  Saturday: Closed    Phone: [phone]

## 2022-08-26 NOTE — PROGRESS NOTES
ADULT ANNUAL VISIT    Subjective:     Chief Complaint:  Well Child (Constantly eats ice)      History of Present Illness:   Alanna Hammer is a 21 y.o. female who presents for their annual well visit today.  She has a history of autism, GERD, lactose intolerance, acne.  Since our last visit, she graduated from college.  She is still living at home and looking for a job.  Recently, she had issues with ingrown toenails but saw Podiatry for this and it has improved.  As long as she avoids lactose, her GI issues do not bother her regularly.  She is on OBGYN recently to establish care but has not yet had a Pap smear.  She is aware that she needs some screening labs done.  Only new concern is a left breast lump.  Patient states that the lump is not new, has been present for more than 5 years.  Her previous pediatrician told her it was likely a cyst but it has never been ultrasounded.  She reports that it does get smaller and larger throughout the course of her menstrual cycle.  She denies overlying skin changes, nipple discharge.  We discussed that she will need to start thinking about transitioning to an adult provider.      Past medical history, family history and social history are reviewed and there are no significant changes.     ROS:  Review of Systems   Constitutional:  Positive for appetite change (PICA, eats ice). Negative for activity change, fatigue and unexpected weight change.   HENT:  Negative for congestion, ear pain, rhinorrhea, sinus pressure, sinus pain, sore throat and trouble swallowing.    Eyes:  Negative for pain, redness and visual disturbance.   Respiratory:  Negative for cough, chest tightness, shortness of breath and wheezing.    Cardiovascular:  Negative for chest pain and palpitations.   Gastrointestinal:  Negative for abdominal pain, constipation, diarrhea, nausea and vomiting.   Endocrine: Negative for cold intolerance, heat intolerance,  "polydipsia and polyuria.   Genitourinary:  Negative for difficulty urinating, dysuria, flank pain, frequency, pelvic pain, vaginal bleeding and vaginal discharge.   Musculoskeletal:  Negative for arthralgias and joint swelling.   Skin:  Negative for rash.        Acne back and chest   Allergic/Immunologic: Negative for environmental allergies and food allergies.   Neurological:  Negative for dizziness, seizures, syncope, weakness and headaches.   Hematological:  Negative for adenopathy.   Psychiatric/Behavioral:  Negative for confusion, dysphoric mood and suicidal ideas. The patient is not nervous/anxious.         Current Outpatient Medications:     diphenhydrAMINE (BENYLIN) 12.5 mg/5 mL liquid, Take by mouth 4 (four) times daily as needed for Allergies., Disp: , Rfl:     ibuprofen (ADVIL,MOTRIN) 100 mg/5 mL suspension, Take 15 mLs by mouth every 6 (six) hours as needed for Temperature greater than., Disp: , Rfl:     lactase (LACTAID) 3,000 unit tablet, Take 1 tablet by mouth 3 (three) times daily with meals., Disp: , Rfl:     Lactobacillus acidophilus (PROBIOTIC ACIDOPHILUS ORAL), Take by mouth., Disp: , Rfl:     polyethylene glycol (GLYCOLAX) 17 gram/dose powder, Take 17 g by mouth as needed., Disp: , Rfl:     metronidazole 0.75% (METROCREAM) 0.75 % Crea, Apply topically 2 (two) times daily., Disp: , Rfl:       Objective:     Physical Examination:     /60   Pulse 70   Ht 5' 5" (1.651 m)   Wt 59.1 kg (130 lb 6 oz)   LMP 08/23/2022   SpO2 99%   BMI 21.70 kg/m²     Physical Exam  Constitutional:       General: She is not in acute distress.     Appearance: She is well-developed.   HENT:      Head: Normocephalic and atraumatic.      Nose: Nose normal.   Eyes:      Conjunctiva/sclera: Conjunctivae normal.      Pupils: Pupils are equal, round, and reactive to light.   Neck:      Thyroid: No thyromegaly.   Cardiovascular:      Rate and Rhythm: Normal rate and regular rhythm.      Heart sounds: Normal heart " sounds. No murmur heard.  Pulmonary:      Effort: Pulmonary effort is normal.      Breath sounds: Normal breath sounds.   Chest:   Breasts:     Right: Normal.      Left: Mass (2 o'clock, firm, rubbery, mobile) present. No swelling, bleeding, inverted nipple, nipple discharge or skin change.   Abdominal:      General: Bowel sounds are normal. There is no distension.      Palpations: Abdomen is soft. There is no mass.      Tenderness: There is no abdominal tenderness. There is no guarding.   Musculoskeletal:      Cervical back: Normal range of motion and neck supple.   Lymphadenopathy:      Cervical: No cervical adenopathy.   Skin:     General: Skin is warm and dry.   Neurological:      Mental Status: She is alert and oriented to person, place, and time.       Assessment/Plan:   Arti is a 21 y.o. female here for annual well visit.      Problem List Items Addressed This Visit          Neuro    Autism    Current Assessment & Plan     Lives at home. Generally independent, recently graduate from college. Completed 90-L today.             Psychiatric    Pica    Relevant Orders    CBC Auto Differential    Iron and TIBC    Ferritin       Derm    Acne    Current Assessment & Plan     F/u with derm. NO longer on oral meds, receiving topical glycolic acid treatments regularly.             Renal/    Mass of upper outer quadrant of left breast    Current Assessment & Plan     Likely cyst, ultrasound ordered.          Relevant Orders    US Breast Left Complete       GI    Gastroesophageal reflux disease without esophagitis    Current Assessment & Plan     Off PPI, no recent symptoms.          Chronic constipation    Current Assessment & Plan     Improved. Miralax PRN.             Other    Counseling for transition from pediatric to adult care provider    Current Assessment & Plan     Initial TRAQ completed and pt scheduled for [facility] visit.           Other Visit Diagnoses       Annual physical exam        Relevant Orders     Comprehensive Metabolic Panel    Lipid Panel            Health Maintenance   Topic Date Due    Hepatitis C Screening  Never done    Lipid Panel  Never done    HPV Vaccines (1 - 2-dose series) Never done    Pap Smear  Never done    TETANUS VACCINE  09/27/2021       Health Maintenance reviewed - patient asked to schedule her pap smear, screening labs.    Discussion:     Follow up in about 2 weeks (around 9/9/2022) for Transition visit.     Goals    None         Electronically signed by Danielle Angel

## 2022-08-29 PROBLEM — N63.21 MASS OF UPPER OUTER QUADRANT OF LEFT BREAST: Status: ACTIVE | Noted: 2022-08-29

## 2022-08-29 PROBLEM — Z02.89 OTHER GENERAL MEDICAL EXAMINATION FOR ADMINISTRATIVE PURPOSES: Status: RESOLVED | Noted: 2021-07-15 | Resolved: 2022-08-29

## 2022-08-29 PROBLEM — F50.89 PICA: Status: ACTIVE | Noted: 2022-08-29

## 2022-08-29 PROBLEM — K29.30 SUPERFICIAL GASTRITIS WITHOUT HEMORRHAGE: Status: RESOLVED | Noted: 2020-11-20 | Resolved: 2022-08-29

## 2022-08-31 ENCOUNTER — HOSPITAL ENCOUNTER (OUTPATIENT)
Dept: RADIOLOGY | Facility: HOSPITAL | Age: 22
Discharge: HOME | End: 2022-08-31
Attending: INTERNAL MEDICINE
Payer: MEDICAID

## 2022-08-31 DIAGNOSIS — N63.21 MASS OF UPPER OUTER QUADRANT OF LEFT BREAST: ICD-10-CM

## 2022-08-31 PROCEDURE — 76642 ULTRASOUND BREAST LIMITED: CPT | Mod: TC,PO,LT

## 2022-09-06 ENCOUNTER — TELEPHONE (OUTPATIENT)
Dept: PEDIATRICS | Facility: CLINIC | Age: 22
End: 2022-09-06

## 2022-09-06 DIAGNOSIS — D50.8 IRON DEFICIENCY ANEMIA SECONDARY TO INADEQUATE DIETARY IRON INTAKE: Primary | ICD-10-CM

## 2022-09-06 RX ORDER — IRON POLYSACCHARIDE COMPLEX 50 MG
1 CAPSULE ORAL DAILY
Qty: 90 CAPSULE | Refills: 2
Start: 2022-09-06

## 2022-09-06 NOTE — TELEPHONE ENCOUNTER
Breast ultrasound was normal.   Labs showed iron deficiency anemia. Given history of GI issues, I recommend Novaferrum 50mg capsule daily (available over the counter) as it is less likely to cause stomach upset.

## 2022-09-09 ENCOUNTER — OFFICE VISIT (OUTPATIENT)
Dept: PEDIATRICS | Facility: CLINIC | Age: 22
End: 2022-09-09
Payer: MEDICAID

## 2022-09-09 VITALS
DIASTOLIC BLOOD PRESSURE: 62 MMHG | HEART RATE: 78 BPM | SYSTOLIC BLOOD PRESSURE: 100 MMHG | OXYGEN SATURATION: 99 % | BODY MASS INDEX: 21.83 KG/M2 | HEIGHT: 65 IN | RESPIRATION RATE: 20 BRPM | WEIGHT: 131 LBS

## 2022-09-09 DIAGNOSIS — D50.9 IRON DEFICIENCY ANEMIA, UNSPECIFIED IRON DEFICIENCY ANEMIA TYPE: ICD-10-CM

## 2022-09-09 DIAGNOSIS — Z71.87 COUNSELING FOR TRANSITION FROM PEDIATRIC TO ADULT CARE PROVIDER: Primary | ICD-10-CM

## 2022-09-09 DIAGNOSIS — F84.0 AUTISM: ICD-10-CM

## 2022-09-09 DIAGNOSIS — N63.21 MASS OF UPPER OUTER QUADRANT OF LEFT BREAST: ICD-10-CM

## 2022-09-09 PROBLEM — F50.89 PICA: Status: RESOLVED | Noted: 2022-08-29 | Resolved: 2022-09-09

## 2022-09-09 PROCEDURE — 99214 PR OFFICE/OUTPT VISIT, EST, LEVL IV, 30-39 MIN: ICD-10-PCS | Mod: S$GLB,,, | Performed by: INTERNAL MEDICINE

## 2022-09-09 PROCEDURE — 3074F SYST BP LT 130 MM HG: CPT | Mod: CPTII,S$GLB,, | Performed by: INTERNAL MEDICINE

## 2022-09-09 PROCEDURE — 1160F RVW MEDS BY RX/DR IN RCRD: CPT | Mod: CPTII,S$GLB,, | Performed by: INTERNAL MEDICINE

## 2022-09-09 PROCEDURE — 3008F PR BODY MASS INDEX (BMI) DOCUMENTED: ICD-10-PCS | Mod: CPTII,S$GLB,, | Performed by: INTERNAL MEDICINE

## 2022-09-09 PROCEDURE — 3078F PR MOST RECENT DIASTOLIC BLOOD PRESSURE < 80 MM HG: ICD-10-PCS | Mod: CPTII,S$GLB,, | Performed by: INTERNAL MEDICINE

## 2022-09-09 PROCEDURE — 3008F BODY MASS INDEX DOCD: CPT | Mod: CPTII,S$GLB,, | Performed by: INTERNAL MEDICINE

## 2022-09-09 PROCEDURE — 1160F PR REVIEW ALL MEDS BY PRESCRIBER/CLIN PHARMACIST DOCUMENTED: ICD-10-PCS | Mod: CPTII,S$GLB,, | Performed by: INTERNAL MEDICINE

## 2022-09-09 PROCEDURE — 3074F PR MOST RECENT SYSTOLIC BLOOD PRESSURE < 130 MM HG: ICD-10-PCS | Mod: CPTII,S$GLB,, | Performed by: INTERNAL MEDICINE

## 2022-09-09 PROCEDURE — 3078F DIAST BP <80 MM HG: CPT | Mod: CPTII,S$GLB,, | Performed by: INTERNAL MEDICINE

## 2022-09-09 PROCEDURE — 1159F MED LIST DOCD IN RCRD: CPT | Mod: CPTII,S$GLB,, | Performed by: INTERNAL MEDICINE

## 2022-09-09 PROCEDURE — 99214 OFFICE O/P EST MOD 30 MIN: CPT | Mod: S$GLB,,, | Performed by: INTERNAL MEDICINE

## 2022-09-09 PROCEDURE — 1159F PR MEDICATION LIST DOCUMENTED IN MEDICAL RECORD: ICD-10-PCS | Mod: CPTII,S$GLB,, | Performed by: INTERNAL MEDICINE

## 2022-09-09 NOTE — PROGRESS NOTES
[facility] Transition Check List      Patient Name: Alanna Hammer  MRN: [id number]  YOB: 2000        Initial Transition Readiness Assessment Questionnaire: Completed  Previsit Intake: Medical Records, Intake Form, Updated Problem List, Med List, Care Team, [employer] Access: Completed  [facility] Program info sent via Epic MyChart or mail: N/A  Initial [facility] Visit: Completed  Transition Plan: In Progress  Transition Readiness Skills Reviewed: In Progress  Discussed legal options for supported decision making: Completed  Select Adult Primary Care Provider: In Progress  Refer to Adult Specialists: N/A  Complete Medical Summary and Emergency Care Plans: In Progress   Final Transition Readiness Assessment Questionnaire:  Scheduled  Transfer Package Sent: Transfer Letter, Final TRAQ, Medical Summary/Emergency Care Plans, Guardianship/Health Proxy Documents if needed, Condition Fact Sheet if needed:  no  Communicated with adult clinician about transfer:  no  Scheduled first appointment with adult PCP:  no  Follow-up with patient/family after first adult visit:  no  Elicited anonymous feedback from patients/families about [facility] supports received while transitioning to adult care:  no

## 2022-09-09 NOTE — PROGRESS NOTES
YOUTH HEALTH TRANSITION INITIAL VISIT    Subjective:     Chief Complaint:  Transition clinic      History of Present Illness:   Arti is a 21 y.o. female who presents for initial youth health transition visit and follow-up of recent labs. Labs were WNL other than iron deficiency anemia. Pt starting today on novaferrum. Reviewed rest of labs.     Transition Readiness Assessment:   Health and Healthcare Score: 30/40 (patient) 26/40 (mom)  Medicines Score: 6/6 (patient) 2/6 (mom)  Areas for improvement: making own appointments, talking to doctor independently, knowing own medical history and family history    Assessment reviewed with mom and patient. Goals generated as noted below.     Past Medical History:   Diagnosis Date    Acne 11/20/2020    Autism 8/21/2020    Chronic constipation 8/22/2020    Gastroesophageal reflux disease without esophagitis 8/21/2020    History of speech therapy         Current Outpatient Medications on File Prior to Visit   Medication Sig Dispense Refill    diphenhydrAMINE (BENYLIN) 12.5 mg/5 mL liquid Take by mouth 4 (four) times daily as needed for Allergies.      ibuprofen (ADVIL,MOTRIN) 100 mg/5 mL suspension Take 15 mLs by mouth every 6 (six) hours as needed for Temperature greater than.      lactase (LACTAID) 3,000 unit tablet Take 1 tablet by mouth 3 (three) times daily with meals.      Lactobacillus acidophilus (PROBIOTIC ACIDOPHILUS ORAL) Take by mouth.      polyethylene glycol (GLYCOLAX) 17 gram/dose powder Take 17 g by mouth as needed.      metronidazole 0.75% (METROCREAM) 0.75 % Crea Apply topically 2 (two) times daily.      polysaccharide iron complex (NOVAFERRUM 50) 50 mg iron Cap Take 1 capsule by mouth once daily. (Patient not taking: Reported on 9/9/2022) 90 capsule 2     No current facility-administered medications on file prior to visit.        Health Maintenance   Topic Date Due    Hepatitis C Screening  Never done    HPV Vaccines (1 - 2-dose series) Never done    Pap  "Smear  Never done    TETANUS VACCINE  09/27/2021    Lipid Panel  Completed       Family History   Problem Relation Age of Onset    No Known Problems Mother     No Known Problems Father        Social History     Socioeconomic History    Marital status: Single   Tobacco Use    Smoking status: Never    Smokeless tobacco: Never   Substance and Sexual Activity    Alcohol use: Never    Drug use: Never    Sexual activity: Never   Social History Narrative    College Graduate    Lives at home with parents    Mom (Elías Hammer) has medical and financial power of         ROS:  Review of Systems   Constitutional:  Positive for fatigue. Negative for activity change, appetite change, chills, diaphoresis and fever.   HENT:  Negative for congestion, ear pain, rhinorrhea, sinus pressure, sinus pain, sneezing and sore throat.    Eyes:  Negative for pain and redness.   Respiratory:  Negative for cough, chest tightness, shortness of breath and wheezing.    Cardiovascular:  Negative for chest pain.   Gastrointestinal:  Negative for diarrhea and vomiting.        PICA   Genitourinary:  Negative for decreased urine volume.   Musculoskeletal:  Negative for arthralgias and myalgias.   Skin:  Negative for rash.      Objective:     Physical Examination:     /62   Pulse 78   Resp 20   Ht 5' 5" (1.651 m)   Wt 59.4 kg (131 lb)   LMP 08/23/2022   SpO2 99%   BMI 21.80 kg/m²     Physical Exam  Constitutional:       General: She is not in acute distress.     Appearance: She is well-developed. She is not diaphoretic.   HENT:      Right Ear: External ear normal.      Left Ear: External ear normal.      Nose: Nose normal.   Eyes:      General:         Right eye: No discharge.         Left eye: No discharge.      Conjunctiva/sclera: Conjunctivae normal.      Pupils: Pupils are equal, round, and reactive to light.   Cardiovascular:      Rate and Rhythm: Normal rate and regular rhythm.      Heart sounds: Normal heart sounds. No " murmur heard.  Pulmonary:      Effort: Pulmonary effort is normal. No respiratory distress.      Breath sounds: Normal breath sounds. No wheezing or rales.   Neurological:      Mental Status: She is alert and oriented to person, place, and time.         Assessment/Plan:   Arti is a 21 y.o. female here for initial youth health transition visit.     Problem List Items Addressed This Visit          Neuro    Autism       Renal/    Mass of upper outer quadrant of left breast    Current Assessment & Plan     Ultrasound normal. Likely fibrocystic breast changes.             Oncology    Iron deficiency anemia    Relevant Orders    CBC Auto Differential    Iron and TIBC    Ferritin    Celiac Disease Comprehensive Panel       Other    Counseling for transition from pediatric to adult care provider - Primary       Discussion:     No follow-ups on file.     Goals        Enroll in patient portal      Get Arti set up on [place] portal for [facility].       Prepare for transition to adult medicine      Check out [email or web address] for information on transition services and decision making for patients with disabilites in [place].   Choose a primary care physician.        Set up Medical ID      Set up on Arti's iPad and/or phone              Handouts provided:   Setting up Medical ID  Planning to Move from Pediatric to Adult Care    Electronically signed by Danielle Angel

## 2022-10-10 ENCOUNTER — LAB VISIT (OUTPATIENT)
Dept: LAB | Facility: HOSPITAL | Age: 22
End: 2022-10-10
Attending: INTERNAL MEDICINE
Payer: MEDICAID

## 2022-10-10 DIAGNOSIS — D50.9 IRON DEFICIENCY ANEMIA, UNSPECIFIED IRON DEFICIENCY ANEMIA TYPE: ICD-10-CM

## 2022-10-10 LAB
BASOPHILS # BLD AUTO: 0.04 K/UL (ref 0–0.2)
BASOPHILS NFR BLD: 0.9 % (ref 0–1.9)
DIFFERENTIAL METHOD: ABNORMAL
EOSINOPHIL # BLD AUTO: 0.1 K/UL (ref 0–0.5)
EOSINOPHIL NFR BLD: 1.4 % (ref 0–8)
ERYTHROCYTE [DISTWIDTH] IN BLOOD BY AUTOMATED COUNT: 15.8 % (ref 11.5–14.5)
FERRITIN SERPL-MCNC: 4 NG/ML (ref 20–300)
HCT VFR BLD AUTO: 36.3 % (ref 37–48.5)
HGB BLD-MCNC: 11.2 G/DL (ref 12–16)
IMM GRANULOCYTES # BLD AUTO: 0.01 K/UL (ref 0–0.04)
IMM GRANULOCYTES NFR BLD AUTO: 0.2 % (ref 0–0.5)
IRON SERPL-MCNC: 39 UG/DL (ref 30–160)
LYMPHOCYTES # BLD AUTO: 1.9 K/UL (ref 1–4.8)
LYMPHOCYTES NFR BLD: 44.4 % (ref 18–48)
MCH RBC QN AUTO: 23.1 PG (ref 27–31)
MCHC RBC AUTO-ENTMCNC: 30.9 G/DL (ref 32–36)
MCV RBC AUTO: 75 FL (ref 82–98)
MONOCYTES # BLD AUTO: 0.3 K/UL (ref 0.3–1)
MONOCYTES NFR BLD: 7.1 % (ref 4–15)
NEUTROPHILS # BLD AUTO: 2 K/UL (ref 1.8–7.7)
NEUTROPHILS NFR BLD: 46 % (ref 38–73)
NRBC BLD-RTO: 0 /100 WBC
PLATELET # BLD AUTO: 238 K/UL (ref 150–450)
PMV BLD AUTO: 10.7 FL (ref 9.2–12.9)
RBC # BLD AUTO: 4.85 M/UL (ref 4–5.4)
SATURATED IRON: 8 % (ref 20–50)
TOTAL IRON BINDING CAPACITY: 472 UG/DL (ref 250–450)
TRANSFERRIN SERPL-MCNC: 337 MG/DL (ref 200–375)
WBC # BLD AUTO: 4.37 K/UL (ref 3.9–12.7)

## 2022-10-10 PROCEDURE — 84466 ASSAY OF TRANSFERRIN: CPT | Performed by: INTERNAL MEDICINE

## 2022-10-10 PROCEDURE — 36415 COLL VENOUS BLD VENIPUNCTURE: CPT | Performed by: INTERNAL MEDICINE

## 2022-10-10 PROCEDURE — 82728 ASSAY OF FERRITIN: CPT | Performed by: INTERNAL MEDICINE

## 2022-10-10 PROCEDURE — 83516 IMMUNOASSAY NONANTIBODY: CPT | Mod: 59 | Performed by: INTERNAL MEDICINE

## 2022-10-10 PROCEDURE — 85025 COMPLETE CBC W/AUTO DIFF WBC: CPT | Performed by: INTERNAL MEDICINE

## 2022-10-12 LAB
ENDOMYSIUM IGA SER QL: NEGATIVE
GLIADIN PEPTIDE IGA SER-ACNC: 4 UNITS (ref 0–19)
GLIADIN PEPTIDE IGG SER-ACNC: 2 UNITS (ref 0–19)
IGA SERPL-MCNC: 197 MG/DL (ref 87–352)
TTG IGA SER-ACNC: <2 U/ML (ref 0–3)
TTG IGG SER-ACNC: 7 U/ML (ref 0–5)

## 2022-11-09 ENCOUNTER — OFFICE VISIT (OUTPATIENT)
Dept: PEDIATRICS | Facility: CLINIC | Age: 22
End: 2022-11-09
Payer: MEDICAID

## 2022-11-09 VITALS
OXYGEN SATURATION: 100 % | WEIGHT: 132.25 LBS | DIASTOLIC BLOOD PRESSURE: 64 MMHG | HEIGHT: 65 IN | SYSTOLIC BLOOD PRESSURE: 110 MMHG | BODY MASS INDEX: 22.03 KG/M2 | RESPIRATION RATE: 20 BRPM | HEART RATE: 79 BPM

## 2022-11-09 DIAGNOSIS — Z71.87 COUNSELING FOR TRANSITION FROM PEDIATRIC TO ADULT CARE PROVIDER: Primary | ICD-10-CM

## 2022-11-09 DIAGNOSIS — K21.9 GASTROESOPHAGEAL REFLUX DISEASE WITHOUT ESOPHAGITIS: ICD-10-CM

## 2022-11-09 DIAGNOSIS — D50.9 IRON DEFICIENCY ANEMIA, UNSPECIFIED IRON DEFICIENCY ANEMIA TYPE: ICD-10-CM

## 2022-11-09 DIAGNOSIS — F84.0 AUTISM: ICD-10-CM

## 2022-11-09 DIAGNOSIS — K59.09 CHRONIC CONSTIPATION: ICD-10-CM

## 2022-11-09 DIAGNOSIS — L70.9 ACNE, UNSPECIFIED ACNE TYPE: ICD-10-CM

## 2022-11-09 PROCEDURE — 3074F PR MOST RECENT SYSTOLIC BLOOD PRESSURE < 130 MM HG: ICD-10-PCS | Mod: CPTII,S$GLB,, | Performed by: INTERNAL MEDICINE

## 2022-11-09 PROCEDURE — 3078F DIAST BP <80 MM HG: CPT | Mod: CPTII,S$GLB,, | Performed by: INTERNAL MEDICINE

## 2022-11-09 PROCEDURE — 1160F PR REVIEW ALL MEDS BY PRESCRIBER/CLIN PHARMACIST DOCUMENTED: ICD-10-PCS | Mod: CPTII,S$GLB,, | Performed by: INTERNAL MEDICINE

## 2022-11-09 PROCEDURE — 3078F PR MOST RECENT DIASTOLIC BLOOD PRESSURE < 80 MM HG: ICD-10-PCS | Mod: CPTII,S$GLB,, | Performed by: INTERNAL MEDICINE

## 2022-11-09 PROCEDURE — 1160F RVW MEDS BY RX/DR IN RCRD: CPT | Mod: CPTII,S$GLB,, | Performed by: INTERNAL MEDICINE

## 2022-11-09 PROCEDURE — 1159F MED LIST DOCD IN RCRD: CPT | Mod: CPTII,S$GLB,, | Performed by: INTERNAL MEDICINE

## 2022-11-09 PROCEDURE — 1159F PR MEDICATION LIST DOCUMENTED IN MEDICAL RECORD: ICD-10-PCS | Mod: CPTII,S$GLB,, | Performed by: INTERNAL MEDICINE

## 2022-11-09 PROCEDURE — 99214 OFFICE O/P EST MOD 30 MIN: CPT | Mod: S$GLB,,, | Performed by: INTERNAL MEDICINE

## 2022-11-09 PROCEDURE — 3074F SYST BP LT 130 MM HG: CPT | Mod: CPTII,S$GLB,, | Performed by: INTERNAL MEDICINE

## 2022-11-09 PROCEDURE — 3008F PR BODY MASS INDEX (BMI) DOCUMENTED: ICD-10-PCS | Mod: CPTII,S$GLB,, | Performed by: INTERNAL MEDICINE

## 2022-11-09 PROCEDURE — 3008F BODY MASS INDEX DOCD: CPT | Mod: CPTII,S$GLB,, | Performed by: INTERNAL MEDICINE

## 2022-11-09 PROCEDURE — 99214 PR OFFICE/OUTPT VISIT, EST, LEVL IV, 30-39 MIN: ICD-10-PCS | Mod: S$GLB,,, | Performed by: INTERNAL MEDICINE

## 2022-11-09 NOTE — PROGRESS NOTES
Youth Health Transition Check List      Patient Name: Alanna Hammer  MRN: [id number]  YOB: 2000        Initial Transition Readiness Assessment Questionnaire: Completed  Previsit Intake: Medical Records, Intake Form, Updated Problem List, Med List, Care Team, [place] [facility] Access: Completed  YHT Program info sent via Epic MyChart or mail: N/A  Initial YHT Visit: Completed  Transition Plan: In Progress  Transition Readiness Skills Reviewed: In Progress  Discussed legal options for supported decision making: Completed  Select Adult Primary Care Provider: In Progress  Refer to Adult Specialists: N/A  Complete Medical Summary and Emergency Care Plans: In Progress   Final Transition Readiness Assessment Questionnaire:  Scheduled  Transfer Package Sent: Transfer Letter, Final TRAQ, Medical Summary/Emergency Care Plans, Guardianship/Health Proxy Documents if needed, Condition Fact Sheet if needed:  no  Communicated with adult clinician about transfer:  no  Scheduled first appointment with adult PCP:  no  Follow-up with patient/family after first adult visit:  no  Elicited anonymous feedback from patients/families about YHT supports received while transitioning to adult care:  no

## 2022-11-09 NOTE — PROGRESS NOTES
YOUTH HEALTH TRANSITION VISIT    Subjective:     Chief Complaint:  Transition of care      History of Present Illness:   Arti is a 22 y.o. female who presents for final youth health transition visit.  She has been doing well.  Follow-up transition readiness assessment questionnaires for both patient and her mother show significant improvement in her scores.  She has met all the goals we discussed at last visit, including using the portal to make appointments, setting up medical ID on her phone and iPad, taking more initially up with refill her medications.  Recent labs showed ongoing iron deficiency anemia.  Patient has been on oral iron replacement consistently for about 1 month before these labs were done.  She denies any significant side effects with the oral iron.  She still has occasional constipation which predates the iron and has not gotten significantly worse.  Testing for celiac disease was negative.  No new complaints today.  Patient is ready for transition to adult medicine and has chosen a new primary care provider- Cristy Valentin NP.     Transition Readiness Assessment:   Health and Healthcare Score: 34/40 (patient) 32/40 (mom)  Medicines Score: 6/6 (patient) 6/6 (mom)  Areas for improvement: making own appointments, talking to doctor independently, health insurance    Past Medical History:   Diagnosis Date    Acne 11/20/2020    Autism 8/21/2020    Chronic constipation 8/22/2020    Gastroesophageal reflux disease without esophagitis 8/21/2020    History of speech therapy         Current Outpatient Medications on File Prior to Visit   Medication Sig Dispense Refill    diphenhydrAMINE (BENYLIN) 12.5 mg/5 mL liquid Take by mouth 4 (four) times daily as needed for Allergies.      ibuprofen (ADVIL,MOTRIN) 100 mg/5 mL suspension Take 15 mLs by mouth every 6 (six) hours as needed for Temperature greater than.      lactase (LACTAID) 3,000 unit tablet Take 1 tablet by mouth 3 (three) times daily with  "meals.      Lactobacillus acidophilus (PROBIOTIC ACIDOPHILUS ORAL) Take by mouth.      polyethylene glycol (GLYCOLAX) 17 gram/dose powder Take 17 g by mouth as needed.      polysaccharide iron complex (NOVAFERRUM 50) 50 mg iron Cap Take 1 capsule by mouth once daily. 90 capsule 2    metronidazole 0.75% (METROCREAM) 0.75 % Crea Apply topically 2 (two) times daily.       No current facility-administered medications on file prior to visit.        Health Maintenance   Topic Date Due    Hepatitis C Screening  Never done    HPV Vaccines (1 - 2-dose series) Never done    Pap Smear  Never done    TETANUS VACCINE  09/27/2021    Lipid Panel  Completed       Family History   Problem Relation Age of Onset    No Known Problems Mother     No Known Problems Father        Social History     Socioeconomic History    Marital status: Single   Tobacco Use    Smoking status: Never    Smokeless tobacco: Never   Substance and Sexual Activity    Alcohol use: Never    Drug use: Never    Sexual activity: Never   Social History Narrative    College Graduate    Lives at home with parents    Mom (Elías Hammer) has medical and financial power of         ROS:  Review of Systems     Objective:     Physical Examination:     /64   Pulse 79   Resp 20   Ht 5' 5" (1.651 m)   Wt 60 kg (132 lb 4 oz)   SpO2 100%   BMI 22.01 kg/m²     Physical Exam  Constitutional:       General: She is not in acute distress.     Appearance: She is well-developed. She is not diaphoretic.   HENT:      Right Ear: External ear normal.      Left Ear: External ear normal.      Nose: Nose normal.   Eyes:      General:         Right eye: No discharge.         Left eye: No discharge.      Conjunctiva/sclera: Conjunctivae normal.      Pupils: Pupils are equal, round, and reactive to light.   Cardiovascular:      Rate and Rhythm: Normal rate and regular rhythm.      Heart sounds: Normal heart sounds. No murmur heard.  Pulmonary:      Effort: Pulmonary " effort is normal. No respiratory distress.      Breath sounds: Normal breath sounds. No wheezing or rales.   Neurological:      Mental Status: She is alert and oriented to person, place, and time.         Assessment/Plan:   Arti is a 22 y.o. female here for initial youth health transition visit.     Problem List Items Addressed This Visit          Neuro    Autism    Current Assessment & Plan     Lives at home with parents and sisters. Generally independent, recently graduated from college with bachelor's degree in graphic design with emphasis on animation. Completed 90-L 8/29/2022, scanned.             Derm    Acne    Current Assessment & Plan     F/u with derm (Dr. Weil). No longer on oral meds, receiving topical glycolic acid treatments regularly.             Oncology    Iron deficiency anemia    Current Assessment & Plan     No significant improvement in h/h or iron stores yet, though may have retested too early. Advised continuing with oral iron for another month, then recheck labs. Mom has h/o iron deficiency anemia requiring iron infusions. Referral placed to Dr. Issa for management.          Relevant Orders    Ambulatory referral/consult to Hematology / Oncology       GI    Gastroesophageal reflux disease without esophagitis    Current Assessment & Plan     Off PPI, no recent symptoms. Sees Dr. Conteh for GI.          Chronic constipation    Current Assessment & Plan     Improved. Miralax PRN.             Other    Counseling for transition from pediatric to adult care provider - Primary       Discussion:     No follow-ups on file.     Goals        Enroll in patient portal      Get Arti set up on [employer] portal for [facility].       Prepare for transition to adult medicine      Check out [email or web address] for information on transition services and decision making for patients with disabilites in [place].   Choose a primary care physician.        Set up Medical ID      Set up on Arti's iPad  and/or phone              Handouts provided:   Questions to ask your new provider      Electronically signed by Danielle Angel

## 2022-11-09 NOTE — PATIENT INSTRUCTIONS
Cristy Valentin -[phone]- call and scheduled for January  Gustavo Issa MD- should be seen OR call me for lab orders if he can't get you in before the end of December                                                                                                                           FOR YOUTH AND YOUNG ADULTS:    QUESTIONS TO ASK YOUR DOCTOR ABOUT TRANSITIONING  TO ADULT HEALTH CARE*           BEFORE MAKING THE FIRST APPOINTMENT TO A NEW ADULT DOCTOR:     []     Do you take my health insurance? Do you require any payment at the time of the visit?     []     Where is your office located?  Is there parking or is it near a metro/bus stop?      []     What are your office hours, and do you have walk-in times?     []     What is your policy about making and cancelling appointments?     []     How will I be able to communicate directly with the doctor after my visit or in the evenings?     []     If needed, can the new adult doctor help me find adult specialty doctors?        BEFORE THE FIRST VISIT TO THE NEW ADULT DOCTOR:     []     Did you receive my medical summary from my pediatric doctor? (Call your pediatric doctor to remind them to send the medical summary before your first visit to the new adult doctor.          []     What should I bring to the first visit?     []     Who can help me when you are not available?       NOTES: ________________________________________________________________________________________________    _______________________________________________________________________________________________________    _______________________________________________________________________________________________________      *The American Academy of Pediatrics, American Academy of Family Physicians, and American College of Physicians recommend that all youth and young adults work with their doctor or other health care provider to  build independence and prepare for the transition to  adult care. For more information about transition, please visit [email or web address] and [email or web address]

## 2022-11-10 NOTE — PROGRESS NOTES
Medical Summary & Emergency Care Plan    Demographics:    Preferred Name: Arti Hammer     Legal Name: Alanna Hammer    YOB: 2000    Preferred Language: English    Address:   [address] [address]    Phone number: [phone]    Email address: [email or web address]    Preferred Method of Communication: Patient Portal       Parent or Caregiver Contact Information:   Name Relationship Lgl Grd Work Phone Home Phone Mobile Phone   1. DANIEL, C* Mother Yes   [phone]        Medical Decision-Maker: Patient, mother Elías has Healthcare POA    Care Team:    Primary Care Provider:   Provider: aDnielle Angel MD  Address: [address] [place] [address]  Phone: [phone]  Fax: [phone]    Care Team:   Patient Care Team:  Danielle Angel MD as PCP - General (Internal Medicine)  Chato Conteh MD as Consulting Physician (Gastroenterology)  Axel Dasilva OD (Optometry)  Collin Diane DPM as Consulting Physician (Podiatry)  Elena Zaragoza MD as Consulting Physician (Obstetrics and Gynecology)  Michael K Weil, MD as Consulting Physician (Pediatric Dermatology)     Preferred Pharmacy:    [facility] [address] [place] [id number] [address] [address]  Phone: [phone] Fax: [phone]      [facility]:  n/a     Allergies / Reactions  Review of patient's allergies indicates:   Allergen Reactions    Egg derived      Egg white    Lactose        Medications to be Avoided Why?      Tylenol  Vomiting              Procedures to be Avoided Why?      N/a                Current problem list:  Patient Active Problem List    Diagnosis Date Noted    Iron deficiency anemia 09/09/2022    Mass of upper outer quadrant of left breast 08/29/2022    Counseling for transition from pediatric to adult care provider 08/26/2022    Acne 11/20/2020    Chronic constipation 08/22/2020    Autism 08/21/2020    Gastroesophageal reflux disease  "without esophagitis 08/21/2020        Medication list:  Current Outpatient Medications   Medication Sig Dispense Refill    diphenhydrAMINE (BENYLIN) 12.5 mg/5 mL liquid Take by mouth 4 (four) times daily as needed for Allergies.      ibuprofen (ADVIL,MOTRIN) 100 mg/5 mL suspension Take 15 mLs by mouth every 6 (six) hours as needed for Temperature greater than.      lactase (LACTAID) 3,000 unit tablet Take 1 tablet by mouth 3 (three) times daily with meals.      Lactobacillus acidophilus (PROBIOTIC ACIDOPHILUS ORAL) Take by mouth.      polyethylene glycol (GLYCOLAX) 17 gram/dose powder Take 17 g by mouth as needed.      polysaccharide iron complex (NOVAFERRUM 50) 50 mg iron Cap Take 1 capsule by mouth once daily. 90 capsule 2    metronidazole 0.75% (METROCREAM) 0.75 % Crea Apply topically 2 (two) times daily.       No current facility-administered medications for this visit.       Equipment, Appliances and Assistive Technology:  []     Gastrostomy -   []     Tracheostomy -   []     Suctions -   []     Nebulizer -   [x]     Communication Device - Communicates verbally but uses ipad + headphones frequently   []     Adaptive Seating -   []     Wheelchair -   []     Orthotics -   []     Crutches -   []     Walker -   []     Prosthetics -   []     Hearing Aids -   [x]     Corrective Lenses - glasses  []     Monitors (Apnea, Oxygen, Cardiac, Glucose) -     Dietary/Nutritional needs:  [x]     Regular Diet -   []     Soft Diet -   []     Dietary Restrictions -   []     Tube Feeds -   []     Vitamins/Supplements -     Current Therapies:  Therapy Name Frequency Provider Contact Information     N/a                  Prior Surgeries:  History reviewed. No pertinent surgical history.    Prior Procedures:   No results found for this or any previous visit.     Prior Hospitalizations:  None recently    Baseline:  Height:   Ht Readings from Last 1 Encounters:   11/09/22 5' 5" (1.651 m)     Weight:   Wt Readings from Last 1 Encounters: "   11/09/22 60 kg (132 lb 4 oz)      RR:   Resp Readings from Last 1 Encounters:   11/09/22 20      HR:   Pulse Readings from Last 1 Encounters:   11/09/22 79     BP:   BP Readings from Last 1 Encounters:   11/09/22 110/64       Neurological status: WNL  Functional status: Independent in all ADLs, some assistance with IADLs  Cognitive/communication skills: Excellent verbal communication, bachelor's degree     Recent Labs:  Lab Visit on 10/10/2022   Component Date Value Ref Range Status    WBC 10/10/2022 4.37  3.90 - 12.70 K/uL Final    RBC 10/10/2022 4.85  4.00 - 5.40 M/uL Final    Hemoglobin 10/10/2022 11.2 (L)  12.0 - 16.0 g/dL Final    Hematocrit 10/10/2022 36.3 (L)  37.0 - 48.5 % Final    MCV 10/10/2022 75 (L)  82 - 98 fL Final    MCH 10/10/2022 23.1 (L)  27.0 - 31.0 pg Final    MCHC 10/10/2022 30.9 (L)  32.0 - 36.0 g/dL Final    RDW 10/10/2022 15.8 (H)  11.5 - 14.5 % Final    Platelets 10/10/2022 238  150 - 450 K/uL Final    MPV 10/10/2022 10.7  9.2 - 12.9 fL Final    Immature Granulocytes 10/10/2022 0.2  0.0 - 0.5 % Final    Gran # (ANC) 10/10/2022 2.0  1.8 - 7.7 K/uL Final    Immature Grans (Abs) 10/10/2022 0.01  0.00 - 0.04 K/uL Final    Lymph # 10/10/2022 1.9  1.0 - 4.8 K/uL Final    Mono # 10/10/2022 0.3  0.3 - 1.0 K/uL Final    Eos # 10/10/2022 0.1  0.0 - 0.5 K/uL Final    Baso # 10/10/2022 0.04  0.00 - 0.20 K/uL Final    nRBC 10/10/2022 0  0 /100 WBC Final    Gran % 10/10/2022 46.0  38.0 - 73.0 % Final    Lymph % 10/10/2022 44.4  18.0 - 48.0 % Final    Mono % 10/10/2022 7.1  4.0 - 15.0 % Final    Eosinophil % 10/10/2022 1.4  0.0 - 8.0 % Final    Basophil % 10/10/2022 0.9  0.0 - 1.9 % Final    Differential Method 10/10/2022 Automated   Final    Iron 10/10/2022 39  30 - 160 ug/dL Final    Transferrin 10/10/2022 337  200 - 375 mg/dL Final    TIBC 10/10/2022 472 (H)  250 - 450 ug/dL Final    Saturated Iron 10/10/2022 8 (L)  20 - 50 % Final    Ferritin 10/10/2022 4 (L)  20.0 - 300.0 ng/mL Final    Gliadin AB  IgA, Deaminated 10/10/2022 4  0 - 19 units Final    Gliadin AB IgG, Deaminated 10/10/2022 2  0 - 19 units Final    TTG IgA 10/10/2022 <2  0 - 3 U/mL Final    TTG IgG 10/10/2022 7 (H)  0 - 5 U/mL Final    Endomysial Abs, IgA 10/10/2022 Negative  Negative Final    IgA 10/10/2022 197  87 - 352 mg/dL Final   Lab Visit on 08/31/2022   Component Date Value Ref Range Status    WBC 08/31/2022 4.09  3.90 - 12.70 K/uL Final    RBC 08/31/2022 4.38  4.00 - 5.40 M/uL Final    Hemoglobin 08/31/2022 10.0 (L)  12.0 - 16.0 g/dL Final    Hematocrit 08/31/2022 33.1 (L)  37.0 - 48.5 % Final    MCV 08/31/2022 76 (L)  82 - 98 fL Final    MCH 08/31/2022 22.8 (L)  27.0 - 31.0 pg Final    MCHC 08/31/2022 30.2 (L)  32.0 - 36.0 g/dL Final    RDW 08/31/2022 16.0 (H)  11.5 - 14.5 % Final    Platelets 08/31/2022 269  150 - 450 K/uL Final    MPV 08/31/2022 11.2  9.2 - 12.9 fL Final    Immature Granulocytes 08/31/2022 0.2  0.0 - 0.5 % Final    Gran # (ANC) 08/31/2022 1.5 (L)  1.8 - 7.7 K/uL Final    Immature Grans (Abs) 08/31/2022 0.01  0.00 - 0.04 K/uL Final    Lymph # 08/31/2022 2.1  1.0 - 4.8 K/uL Final    Mono # 08/31/2022 0.4  0.3 - 1.0 K/uL Final    Eos # 08/31/2022 0.1  0.0 - 0.5 K/uL Final    Baso # 08/31/2022 0.05  0.00 - 0.20 K/uL Final    nRBC 08/31/2022 0  0 /100 WBC Final    Gran % 08/31/2022 36.7 (L)  38.0 - 73.0 % Final    Lymph % 08/31/2022 50.1 (H)  18.0 - 48.0 % Final    Mono % 08/31/2022 8.6  4.0 - 15.0 % Final    Eosinophil % 08/31/2022 3.2  0.0 - 8.0 % Final    Basophil % 08/31/2022 1.2  0.0 - 1.9 % Final    Differential Method 08/31/2022 Automated   Final    Iron 08/31/2022 13 (L)  30 - 160 ug/dL Final    Transferrin 08/31/2022 306  200 - 375 mg/dL Final    TIBC 08/31/2022 428  250 - 450 ug/dL Final    Saturated Iron 08/31/2022 3 (L)  20 - 50 % Final    Ferritin 08/31/2022 4 (L)  20.0 - 300.0 ng/mL Final    Sodium 08/31/2022 136  136 - 145 mmol/L Final    Potassium 08/31/2022 3.4 (L)  3.5 - 5.1 mmol/L Final    Chloride  08/31/2022 104  95 - 110 mmol/L Final    CO2 08/31/2022 25  23 - 29 mmol/L Final    Glucose 08/31/2022 74  70 - 110 mg/dL Final    BUN 08/31/2022 11  6 - 20 mg/dL Final    Creatinine 08/31/2022 0.6  0.5 - 1.4 mg/dL Final    Calcium 08/31/2022 9.1  8.7 - 10.5 mg/dL Final    Total Protein 08/31/2022 7.0  6.0 - 8.4 g/dL Final    Albumin 08/31/2022 4.0  3.5 - 5.2 g/dL Final    Total Bilirubin 08/31/2022 0.3  0.1 - 1.0 mg/dL Final    Alkaline Phosphatase 08/31/2022 62  55 - 135 U/L Final    AST 08/31/2022 20  10 - 40 U/L Final    ALT 08/31/2022 16  10 - 44 U/L Final    Anion Gap 08/31/2022 7 (L)  8 - 16 mmol/L Final    eGFR 08/31/2022 >60.0  >60 mL/min/1.73 m^2 Corrected    Cholesterol 08/31/2022 153  120 - 199 mg/dL Final    Triglycerides 08/31/2022 20 (L)  30 - 150 mg/dL Final    HDL 08/31/2022 77 (H)  40 - 75 mg/dL Final    LDL Cholesterol 08/31/2022 72.0  63.0 - 159.0 mg/dL Final    HDL/Cholesterol Ratio 08/31/2022 50.3 (H)  20.0 - 50.0 % Final    Total Cholesterol/HDL Ratio 08/31/2022 2.0  2.0 - 5.0 Final    Non-HDL Cholesterol 08/31/2022 76  mg/dL Final   )    Recent Radiology:   L Breast u/s - Grayscale evaluation of the left breast 3-4 o'clock area of interest, 6 cm from the nipple was performed.  Of note, patient reports the palpable abnormality is intermittent, and does not feel it today.  Normal fibroglandular elements are evident in this region.  No suspicious cystic or solid mass.      Services Currently Receiving: [facility] (job placement)   : Yannick Patiño  Phone: [phone]  [email or web address]        Emergency Care Plan:  Preferred Emergency Care Location: [facility]     Emergency Contact/Relationship:  Extended Emergency Contact Information  Primary Emergency Contact: Elías Hammer  Mobile Phone: [phone]  Relation: Mother  Preferred language: English   needed? No  *Call mom and FaceTime if  available    Common Emergent Presenting Problem(s) Suggested Tests Treatment Considerations     N/a

## 2022-11-11 NOTE — ASSESSMENT & PLAN NOTE
F/u with derm (Dr. Weil). No longer on oral meds, receiving topical glycolic acid treatments regularly.

## 2022-11-11 NOTE — ASSESSMENT & PLAN NOTE
? Initial Transition Readiness Assessment Questionnaire: Completed  ? Previsit Intake: Medical Records, Intake Form, Updated Problem List, Med List, Care Team, [employer] Access: Completed  ? YHT Program info sent via Epic MyChart or mail: N/A  ? Initial YHT Visit: Completed  ? Transition Plan: Completed  ? Transition Readiness Skills Reviewed: Completed  ? Discussed legal options for supported decision making: Completed  ? Select Adult Primary Care Provider: Completed  ? Refer to Adult Specialists: Completed  ? Complete Medical Summary and Emergency Care Plans: Completed   ? Final Transition Readiness Assessment Questionnaire: Completed  ? Transfer Package Sent: Transfer Letter, Final TRAQ, Medical Summary/Emergency Care Plans, Guardianship/Health Proxy Documents if needed, Condition Fact Sheet if needed: In Progress  ? Communicated with adult clinician about transfer: In Progress  ? Scheduled first appointment with adult PCP: In Progress  ? Follow-up with patient/family after first adult visit: In Progress  ? Elicited anonymous feedback from patients/families about Y supports received while transitioning to adult care: In Progress

## 2022-11-11 NOTE — ASSESSMENT & PLAN NOTE
No significant improvement in h/h or iron stores yet, though may have retested too early. Advised continuing with oral iron for another month, then recheck labs. Mom has h/o iron deficiency anemia requiring iron infusions. Referral placed to Dr. Issa for management.

## 2022-11-11 NOTE — ASSESSMENT & PLAN NOTE
Lives at home with parents and sisters. Generally independent, recently graduated from college with bachelor's degree in graphic design with emphasis on animation. Completed 90-L 8/29/2022, scanned.

## 2022-11-15 NOTE — LETTER
"[facility]  [address]  [place] [address]  Phone: [phone]  Fax: [phone] November 17, 2022       Patient: Alanna Hammer   MR Number: [id number]   YOB: 2000   Date of Visit: 11/15/2022       Dear SAHIL Valentin,    I am referring my patient, Alanna Hammer, to you for adult primary care. She is a santy 22 year old young woman who has a diagnosis of autism. She recently completed a bachelor's degree in [employer] design with an emphasis on animation, and now works [employer] in that area. She lives at home with her family, including her mother, Elías, who is Arti's power of  for healthcare. In practice, Arti makes her own decisions and has been taking more responsibility for her own healthcare needs. Her mother comes to all her visits and supports her decision making.    Arti has been generally healthy. She has had some issues with GERD which seems to flare up during times of stress. She has seen Dr. Conteh for GI and had an EGD in 2020 showing non-H. Pylori gastritis. She is not currently on a PPI and has been asymptomatic for the past few months. She manages her chronic constipation with PRN miralax. Current active issues include acne which is being managed by Dr. Weil and iron deficiency anemia for which she was recently referred to Dr. Issa. She has been compliant with oral iron but has had some stomach upset with it and did not have significant improvement in iron stores or anemia on recent labs. I have recommended she continue with oral iron and plan to recheck CBC and iron indices in December or January, with the plan to pursue IV iron infusions if not improving.     I have recommended that Arti plan to see you in January to establish care. I hope you will find that you and she are a good fit. In her chart under "Notes" you will find a medical summary as well as her last Youth Health Transition visit, both dated 11/9/22.  Please feel " free to reach out to me if you have an additional questions or concerns.       Sincerely,                      Danielle Angel MD            CC    No Recipients

## 2022-11-15 NOTE — Clinical Note
"Bren Muniz, I am referring this santy young lady to you as she has completed my use health transition program and needs to establish with a new adult provider.  I have summarized her active issues in the attached letter, and you can also find more information in her chart, particularly her "Medical Summary" and her final "Youth Health Transition" notes.   Let me know if you have any questions or concerns. Could you have your  nurse or  reach out to them to schedule? I am hoping at some point to be able to have my  schedule our transition patients with adult [facility] providers, but we aren't there yet.   Thanks!! Danielle"

## 2022-12-02 ENCOUNTER — OFFICE VISIT (OUTPATIENT)
Dept: HEMATOLOGY/ONCOLOGY | Facility: CLINIC | Age: 22
End: 2022-12-02
Payer: MEDICAID

## 2022-12-02 VITALS
TEMPERATURE: 99 F | RESPIRATION RATE: 16 BRPM | DIASTOLIC BLOOD PRESSURE: 58 MMHG | HEIGHT: 65 IN | BODY MASS INDEX: 21.83 KG/M2 | WEIGHT: 131 LBS | HEART RATE: 77 BPM | SYSTOLIC BLOOD PRESSURE: 120 MMHG

## 2022-12-02 DIAGNOSIS — D50.9 IRON DEFICIENCY ANEMIA, UNSPECIFIED IRON DEFICIENCY ANEMIA TYPE: ICD-10-CM

## 2022-12-02 DIAGNOSIS — D50.9 MICROCYTIC ANEMIA: Primary | ICD-10-CM

## 2022-12-02 PROCEDURE — 3078F DIAST BP <80 MM HG: CPT | Mod: CPTII,S$GLB,, | Performed by: NURSE PRACTITIONER

## 2022-12-02 PROCEDURE — 3074F PR MOST RECENT SYSTOLIC BLOOD PRESSURE < 130 MM HG: ICD-10-PCS | Mod: CPTII,S$GLB,, | Performed by: NURSE PRACTITIONER

## 2022-12-02 PROCEDURE — 3078F PR MOST RECENT DIASTOLIC BLOOD PRESSURE < 80 MM HG: ICD-10-PCS | Mod: CPTII,S$GLB,, | Performed by: NURSE PRACTITIONER

## 2022-12-02 PROCEDURE — 3008F PR BODY MASS INDEX (BMI) DOCUMENTED: ICD-10-PCS | Mod: CPTII,S$GLB,, | Performed by: NURSE PRACTITIONER

## 2022-12-02 PROCEDURE — 99205 PR OFFICE/OUTPT VISIT, NEW, LEVL V, 60-74 MIN: ICD-10-PCS | Mod: S$GLB,,, | Performed by: NURSE PRACTITIONER

## 2022-12-02 PROCEDURE — 1159F MED LIST DOCD IN RCRD: CPT | Mod: CPTII,S$GLB,, | Performed by: NURSE PRACTITIONER

## 2022-12-02 PROCEDURE — 3074F SYST BP LT 130 MM HG: CPT | Mod: CPTII,S$GLB,, | Performed by: NURSE PRACTITIONER

## 2022-12-02 PROCEDURE — 3008F BODY MASS INDEX DOCD: CPT | Mod: CPTII,S$GLB,, | Performed by: NURSE PRACTITIONER

## 2022-12-02 PROCEDURE — 1159F PR MEDICATION LIST DOCUMENTED IN MEDICAL RECORD: ICD-10-PCS | Mod: CPTII,S$GLB,, | Performed by: NURSE PRACTITIONER

## 2022-12-02 PROCEDURE — 99205 OFFICE O/P NEW HI 60 MIN: CPT | Mod: S$GLB,,, | Performed by: NURSE PRACTITIONER

## 2022-12-02 RX ORDER — ACETAMINOPHEN 325 MG/1
650 TABLET ORAL
Status: CANCELLED
Start: 2022-12-02

## 2022-12-02 RX ORDER — HEPARIN 100 UNIT/ML
500 SYRINGE INTRAVENOUS
Status: CANCELLED | OUTPATIENT
Start: 2022-12-02

## 2022-12-02 RX ORDER — DIPHENHYDRAMINE HYDROCHLORIDE 50 MG/ML
12.5 INJECTION INTRAMUSCULAR; INTRAVENOUS
Status: CANCELLED
Start: 2022-12-02

## 2022-12-02 RX ORDER — SODIUM CHLORIDE 0.9 % (FLUSH) 0.9 %
10 SYRINGE (ML) INJECTION
Status: CANCELLED | OUTPATIENT
Start: 2022-12-02

## 2022-12-05 ENCOUNTER — LAB VISIT (OUTPATIENT)
Dept: LAB | Facility: HOSPITAL | Age: 22
End: 2022-12-05
Attending: NURSE PRACTITIONER
Payer: MEDICAID

## 2022-12-05 DIAGNOSIS — D50.9 MICROCYTIC ANEMIA: ICD-10-CM

## 2022-12-05 DIAGNOSIS — D50.9 IRON DEFICIENCY ANEMIA, UNSPECIFIED IRON DEFICIENCY ANEMIA TYPE: ICD-10-CM

## 2022-12-05 LAB
ALBUMIN SERPL BCP-MCNC: 4 G/DL (ref 3.5–5.2)
ALP SERPL-CCNC: 68 U/L (ref 55–135)
ALT SERPL W/O P-5'-P-CCNC: 18 U/L (ref 10–44)
ANION GAP SERPL CALC-SCNC: 7 MMOL/L (ref 8–16)
AST SERPL-CCNC: 22 U/L (ref 10–40)
BASOPHILS # BLD AUTO: 0.03 K/UL (ref 0–0.2)
BASOPHILS NFR BLD: 0.7 % (ref 0–1.9)
BILIRUB SERPL-MCNC: 0.3 MG/DL (ref 0.1–1)
BUN SERPL-MCNC: 6 MG/DL (ref 6–20)
CALCIUM SERPL-MCNC: 8.8 MG/DL (ref 8.7–10.5)
CHLORIDE SERPL-SCNC: 102 MMOL/L (ref 95–110)
CO2 SERPL-SCNC: 27 MMOL/L (ref 23–29)
CREAT SERPL-MCNC: 0.5 MG/DL (ref 0.5–1.4)
DIFFERENTIAL METHOD: ABNORMAL
EOSINOPHIL # BLD AUTO: 0.1 K/UL (ref 0–0.5)
EOSINOPHIL NFR BLD: 1.9 % (ref 0–8)
ERYTHROCYTE [DISTWIDTH] IN BLOOD BY AUTOMATED COUNT: 16.6 % (ref 11.5–14.5)
EST. GFR  (NO RACE VARIABLE): >60 ML/MIN/1.73 M^2
FERRITIN SERPL-MCNC: 6 NG/ML (ref 20–300)
GLUCOSE SERPL-MCNC: 78 MG/DL (ref 70–110)
HCG INTACT+B SERPL-ACNC: <0.6 MIU/ML
HCT VFR BLD AUTO: 35.8 % (ref 37–48.5)
HGB BLD-MCNC: 11.1 G/DL (ref 12–16)
IMM GRANULOCYTES # BLD AUTO: 0.01 K/UL (ref 0–0.04)
IMM GRANULOCYTES NFR BLD AUTO: 0.2 % (ref 0–0.5)
IRON SERPL-MCNC: 13 UG/DL (ref 30–160)
LYMPHOCYTES # BLD AUTO: 1.9 K/UL (ref 1–4.8)
LYMPHOCYTES NFR BLD: 45.5 % (ref 18–48)
MCH RBC QN AUTO: 23.7 PG (ref 27–31)
MCHC RBC AUTO-ENTMCNC: 31 G/DL (ref 32–36)
MCV RBC AUTO: 76 FL (ref 82–98)
MONOCYTES # BLD AUTO: 0.4 K/UL (ref 0.3–1)
MONOCYTES NFR BLD: 10.5 % (ref 4–15)
NEUTROPHILS # BLD AUTO: 1.7 K/UL (ref 1.8–7.7)
NEUTROPHILS NFR BLD: 41.2 % (ref 38–73)
NRBC BLD-RTO: 0 /100 WBC
PLATELET # BLD AUTO: 230 K/UL (ref 150–450)
PMV BLD AUTO: 10.4 FL (ref 9.2–12.9)
POTASSIUM SERPL-SCNC: 3 MMOL/L (ref 3.5–5.1)
PROT SERPL-MCNC: 7.8 G/DL (ref 6–8.4)
RBC # BLD AUTO: 4.69 M/UL (ref 4–5.4)
SATURATED IRON: 3 % (ref 20–50)
SODIUM SERPL-SCNC: 136 MMOL/L (ref 136–145)
TOTAL IRON BINDING CAPACITY: 385 UG/DL (ref 250–450)
TRANSFERRIN SERPL-MCNC: 275 MG/DL (ref 200–375)
WBC # BLD AUTO: 4.11 K/UL (ref 3.9–12.7)

## 2022-12-05 PROCEDURE — 82728 ASSAY OF FERRITIN: CPT | Performed by: NURSE PRACTITIONER

## 2022-12-05 PROCEDURE — 80053 COMPREHEN METABOLIC PANEL: CPT | Performed by: NURSE PRACTITIONER

## 2022-12-05 PROCEDURE — 85025 COMPLETE CBC W/AUTO DIFF WBC: CPT | Performed by: NURSE PRACTITIONER

## 2022-12-05 PROCEDURE — 81257 HBA1/HBA2 GENE: CPT | Mod: 91 | Performed by: NURSE PRACTITIONER

## 2022-12-05 PROCEDURE — 84466 ASSAY OF TRANSFERRIN: CPT | Performed by: NURSE PRACTITIONER

## 2022-12-05 PROCEDURE — 84702 CHORIONIC GONADOTROPIN TEST: CPT | Performed by: NURSE PRACTITIONER

## 2022-12-05 PROCEDURE — 83020 HEMOGLOBIN ELECTROPHORESIS: CPT | Performed by: NURSE PRACTITIONER

## 2022-12-05 PROCEDURE — 30000890 LABCORP MISCELLANEOUS TEST: Mod: 91 | Performed by: NURSE PRACTITIONER

## 2022-12-07 LAB
LABCORP MISC TEST CODE: NORMAL
LABCORP MISC TEST NAME: NORMAL
LABCORP MISCELLANEOUS TEST: NORMAL

## 2022-12-14 ENCOUNTER — TELEPHONE (OUTPATIENT)
Dept: FAMILY MEDICINE | Facility: CLINIC | Age: 22
End: 2022-12-14

## 2022-12-14 LAB
LABCORP MISC TEST CODE: NORMAL
LABCORP MISC TEST NAME: NORMAL
LABCORP MISCELLANEOUS TEST: NORMAL

## 2022-12-21 ENCOUNTER — INFUSION (OUTPATIENT)
Dept: INFUSION THERAPY | Facility: HOSPITAL | Age: 22
End: 2022-12-21
Attending: INTERNAL MEDICINE
Payer: MEDICAID

## 2022-12-21 VITALS
OXYGEN SATURATION: 100 % | HEIGHT: 65 IN | TEMPERATURE: 99 F | DIASTOLIC BLOOD PRESSURE: 61 MMHG | BODY MASS INDEX: 22.22 KG/M2 | HEART RATE: 73 BPM | RESPIRATION RATE: 17 BRPM | SYSTOLIC BLOOD PRESSURE: 97 MMHG | WEIGHT: 133.38 LBS

## 2022-12-21 DIAGNOSIS — D50.9 IRON DEFICIENCY ANEMIA, UNSPECIFIED IRON DEFICIENCY ANEMIA TYPE: Primary | ICD-10-CM

## 2022-12-21 PROCEDURE — 63600175 PHARM REV CODE 636 W HCPCS: Performed by: INTERNAL MEDICINE

## 2022-12-21 PROCEDURE — 63600175 PHARM REV CODE 636 W HCPCS: Performed by: NURSE PRACTITIONER

## 2022-12-21 PROCEDURE — 96367 TX/PROPH/DG ADDL SEQ IV INF: CPT

## 2022-12-21 PROCEDURE — 96365 THER/PROPH/DIAG IV INF INIT: CPT

## 2022-12-21 PROCEDURE — 25000003 PHARM REV CODE 250: Performed by: INTERNAL MEDICINE

## 2022-12-21 PROCEDURE — 25000003 PHARM REV CODE 250: Performed by: NURSE PRACTITIONER

## 2022-12-21 RX ORDER — HEPARIN 100 UNIT/ML
500 SYRINGE INTRAVENOUS
Status: CANCELLED | OUTPATIENT
Start: 2022-12-28

## 2022-12-21 RX ORDER — SODIUM CHLORIDE 0.9 % (FLUSH) 0.9 %
10 SYRINGE (ML) INJECTION
Status: DISCONTINUED | OUTPATIENT
Start: 2022-12-21 | End: 2022-12-21 | Stop reason: HOSPADM

## 2022-12-21 RX ORDER — DIPHENHYDRAMINE HYDROCHLORIDE 50 MG/ML
12.5 INJECTION INTRAMUSCULAR; INTRAVENOUS
Status: CANCELLED
Start: 2022-12-28

## 2022-12-21 RX ORDER — DIPHENHYDRAMINE HYDROCHLORIDE 50 MG/ML
12.5 INJECTION INTRAMUSCULAR; INTRAVENOUS
Status: DISCONTINUED | OUTPATIENT
Start: 2022-12-21 | End: 2022-12-21

## 2022-12-21 RX ORDER — ACETAMINOPHEN 325 MG/1
650 TABLET ORAL
Status: COMPLETED | OUTPATIENT
Start: 2022-12-21 | End: 2022-12-21

## 2022-12-21 RX ORDER — ACETAMINOPHEN 325 MG/1
650 TABLET ORAL
Status: CANCELLED
Start: 2022-12-28

## 2022-12-21 RX ORDER — SODIUM CHLORIDE 0.9 % (FLUSH) 0.9 %
10 SYRINGE (ML) INJECTION
Status: CANCELLED | OUTPATIENT
Start: 2022-12-28

## 2022-12-21 RX ORDER — HEPARIN 100 UNIT/ML
500 SYRINGE INTRAVENOUS
Status: DISCONTINUED | OUTPATIENT
Start: 2022-12-21 | End: 2022-12-21 | Stop reason: HOSPADM

## 2022-12-21 RX ADMIN — ACETAMINOPHEN 650 MG: 325 TABLET ORAL at 02:12

## 2022-12-21 RX ADMIN — IRON SUCROSE 100 MG: 20 INJECTION, SOLUTION INTRAVENOUS at 02:12

## 2022-12-21 RX ADMIN — DIPHENHYDRAMINE HYDROCHLORIDE 12.5 MG: 50 INJECTION INTRAMUSCULAR; INTRAVENOUS at 01:12

## 2022-12-21 NOTE — PLAN OF CARE
Problem: Anemia  Goal: Anemia Symptom Improvement  Outcome: Ongoing, Progressing  Intervention: Monitor and Manage Anemia  Flowsheets (Taken 12/21/2022 9827)  Oral Nutrition Promotion: rest periods promoted  Fatigue Management:   activity schedule adjusted   frequent rest breaks encouraged

## 2022-12-23 ENCOUNTER — TELEPHONE (OUTPATIENT)
Dept: FAMILY MEDICINE | Facility: CLINIC | Age: 22
End: 2022-12-23

## 2022-12-23 NOTE — TELEPHONE ENCOUNTER
Dr. Angel sent letter referring pt to [facility].  Attempted to reach pt several times, no answer Mailbox was full unable to leave message.

## 2022-12-28 ENCOUNTER — INFUSION (OUTPATIENT)
Dept: INFUSION THERAPY | Facility: HOSPITAL | Age: 22
End: 2022-12-28
Attending: INTERNAL MEDICINE
Payer: MEDICAID

## 2022-12-28 VITALS
RESPIRATION RATE: 16 BRPM | SYSTOLIC BLOOD PRESSURE: 95 MMHG | HEIGHT: 65 IN | WEIGHT: 133.38 LBS | TEMPERATURE: 98 F | BODY MASS INDEX: 22.22 KG/M2 | HEART RATE: 67 BPM | DIASTOLIC BLOOD PRESSURE: 60 MMHG | OXYGEN SATURATION: 100 %

## 2022-12-28 DIAGNOSIS — D50.9 IRON DEFICIENCY ANEMIA, UNSPECIFIED IRON DEFICIENCY ANEMIA TYPE: Primary | ICD-10-CM

## 2022-12-28 PROCEDURE — 63600175 PHARM REV CODE 636 W HCPCS: Performed by: NURSE PRACTITIONER

## 2022-12-28 PROCEDURE — 25000003 PHARM REV CODE 250: Performed by: NURSE PRACTITIONER

## 2022-12-28 PROCEDURE — 96367 TX/PROPH/DG ADDL SEQ IV INF: CPT

## 2022-12-28 PROCEDURE — 96365 THER/PROPH/DIAG IV INF INIT: CPT

## 2022-12-28 RX ORDER — SODIUM CHLORIDE 0.9 % (FLUSH) 0.9 %
10 SYRINGE (ML) INJECTION
Status: CANCELLED | OUTPATIENT
Start: 2023-01-04

## 2022-12-28 RX ORDER — HEPARIN 100 UNIT/ML
500 SYRINGE INTRAVENOUS
Status: CANCELLED | OUTPATIENT
Start: 2023-01-04

## 2022-12-28 RX ORDER — ACETAMINOPHEN 325 MG/1
650 TABLET ORAL
Status: CANCELLED
Start: 2023-01-04

## 2022-12-28 RX ORDER — ACETAMINOPHEN 325 MG/1
650 TABLET ORAL
Status: COMPLETED | OUTPATIENT
Start: 2022-12-28 | End: 2022-12-28

## 2022-12-28 RX ORDER — DIPHENHYDRAMINE HYDROCHLORIDE 50 MG/ML
12.5 INJECTION INTRAMUSCULAR; INTRAVENOUS
Status: CANCELLED
Start: 2023-01-04

## 2022-12-28 RX ORDER — SODIUM CHLORIDE 0.9 % (FLUSH) 0.9 %
10 SYRINGE (ML) INJECTION
Status: DISCONTINUED | OUTPATIENT
Start: 2022-12-28 | End: 2022-12-28 | Stop reason: HOSPADM

## 2022-12-28 RX ADMIN — DIPHENHYDRAMINE HYDROCHLORIDE: 50 INJECTION INTRAMUSCULAR; INTRAVENOUS at 10:12

## 2022-12-28 RX ADMIN — ACETAMINOPHEN 650 MG: 325 TABLET ORAL at 10:12

## 2022-12-28 RX ADMIN — SODIUM CHLORIDE: 0.9 INJECTION, SOLUTION INTRAVENOUS at 10:12

## 2022-12-28 RX ADMIN — IRON SUCROSE 100 MG: 20 INJECTION, SOLUTION INTRAVENOUS at 11:12

## 2022-12-28 NOTE — PLAN OF CARE
Problem: Anemia  Goal: Anemia Symptom Improvement  Outcome: Ongoing, Progressing  Intervention: Monitor and Manage Anemia  Flowsheets (Taken 12/28/2022 1005)  Oral Nutrition Promotion: rest periods promoted  Safety Promotion/Fall Prevention: medications reviewed  Fatigue Management: frequent rest breaks encouraged

## 2023-01-03 ENCOUNTER — PATIENT MESSAGE (OUTPATIENT)
Dept: PEDIATRICS | Facility: CLINIC | Age: 23
End: 2023-01-03

## 2023-01-04 ENCOUNTER — INFUSION (OUTPATIENT)
Dept: INFUSION THERAPY | Facility: HOSPITAL | Age: 23
End: 2023-01-04
Attending: INTERNAL MEDICINE
Payer: MEDICAID

## 2023-01-04 VITALS
HEIGHT: 65 IN | SYSTOLIC BLOOD PRESSURE: 97 MMHG | DIASTOLIC BLOOD PRESSURE: 49 MMHG | TEMPERATURE: 98 F | HEART RATE: 85 BPM | OXYGEN SATURATION: 100 % | BODY MASS INDEX: 22.39 KG/M2 | WEIGHT: 134.38 LBS | RESPIRATION RATE: 16 BRPM

## 2023-01-04 DIAGNOSIS — D50.9 IRON DEFICIENCY ANEMIA, UNSPECIFIED IRON DEFICIENCY ANEMIA TYPE: Primary | ICD-10-CM

## 2023-01-04 PROCEDURE — 63600175 PHARM REV CODE 636 W HCPCS: Performed by: NURSE PRACTITIONER

## 2023-01-04 PROCEDURE — 96365 THER/PROPH/DIAG IV INF INIT: CPT

## 2023-01-04 PROCEDURE — 96367 TX/PROPH/DG ADDL SEQ IV INF: CPT

## 2023-01-04 PROCEDURE — 25000003 PHARM REV CODE 250: Performed by: NURSE PRACTITIONER

## 2023-01-04 RX ORDER — ACETAMINOPHEN 650 MG/20.3ML
650 LIQUID ORAL
Status: CANCELLED
Start: 2023-01-11 | End: 2023-01-11

## 2023-01-04 RX ORDER — HEPARIN 100 UNIT/ML
500 SYRINGE INTRAVENOUS
Status: CANCELLED | OUTPATIENT
Start: 2023-01-11

## 2023-01-04 RX ORDER — ACETAMINOPHEN 325 MG/1
650 TABLET ORAL
Status: COMPLETED | OUTPATIENT
Start: 2023-01-04 | End: 2023-01-04

## 2023-01-04 RX ORDER — ACETAMINOPHEN 650 MG/20.3ML
650 LIQUID ORAL
Status: CANCELLED
Start: 2023-01-04 | End: 2023-01-04

## 2023-01-04 RX ORDER — DIPHENHYDRAMINE HYDROCHLORIDE 50 MG/ML
12.5 INJECTION INTRAMUSCULAR; INTRAVENOUS
Status: CANCELLED
Start: 2023-01-11

## 2023-01-04 RX ORDER — SODIUM CHLORIDE 0.9 % (FLUSH) 0.9 %
10 SYRINGE (ML) INJECTION
Status: CANCELLED | OUTPATIENT
Start: 2023-01-11

## 2023-01-04 RX ORDER — SODIUM CHLORIDE 0.9 % (FLUSH) 0.9 %
10 SYRINGE (ML) INJECTION
Status: DISCONTINUED | OUTPATIENT
Start: 2023-01-04 | End: 2023-01-04 | Stop reason: HOSPADM

## 2023-01-04 RX ORDER — ACETAMINOPHEN 325 MG/1
650 TABLET ORAL
Status: CANCELLED
Start: 2023-01-11

## 2023-01-04 RX ADMIN — ACETAMINOPHEN 650 MG: 325 TABLET ORAL at 01:01

## 2023-01-04 RX ADMIN — SODIUM CHLORIDE: 0.9 INJECTION, SOLUTION INTRAVENOUS at 01:01

## 2023-01-04 RX ADMIN — DIPHENHYDRAMINE HYDROCHLORIDE: 50 INJECTION INTRAMUSCULAR; INTRAVENOUS at 01:01

## 2023-01-04 RX ADMIN — IRON SUCROSE 100 MG: 20 INJECTION, SOLUTION INTRAVENOUS at 02:01

## 2023-01-04 NOTE — PLAN OF CARE
Problem: Anemia  Goal: Anemia Symptom Improvement  Outcome: Ongoing, Progressing  Intervention: Monitor and Manage Anemia  Flowsheets (Taken 1/4/2023 1314)  Oral Nutrition Promotion: rest periods promoted  Safety Promotion/Fall Prevention: medications reviewed  Fatigue Management: frequent rest breaks encouraged

## 2023-01-10 NOTE — PROGRESS NOTES
[facility] Hematology/Oncology  PROGRESS NOTE - 2nd Follow-up Visit      Subjective:       Patient ID:   NAME: Alanna Hammer : 2000     22 y.o. female    Referring Doc: Danielle Angel MD  Other Physicians: Dr. Costa, Dr. Conteh, Dr. Diane,             Chief Complaint: Iron Deficiency Anemia  f/u     History of Present Illness:     Patient returns today for a 2nd regularly scheduled follow-up visit.  The patient is here today to go over the results of the recently ordered labs, tests and studies. Patient was seen by [facility] for her initial visit. She is here with her dad.     She states she has been eating and craving ice for at least 8 - 10 years.   She states she does have irregular and heavy periods    She reports that she is feeling better overall and is craving ice less.    No CP, SOB, HA's or N/V     She has been on IV iron and is getting the 4th one today        ROS:   GEN: normal without any fever, night sweats or weight loss;  fatigue, craves ice  HEENT: normal with no HA's, sore throat, stiff neck, changes in vision  CV: normal with no CP, SOB, PND, DAVIS or orthopnea  PULM: normal with no SOB, cough, hemoptysis, sputum or pleuritic pain  GI: normal with no abdominal pain, nausea, vomiting, constipation, diarrhea, melanotic stools, BRBPR, or hematemesis  : normal with no hematuria, dysuria  BREAST: normal with no mass, discharge, pain  SKIN: normal with no rash, erythema, bruising, or swelling    Pain Scale: 0     Allergies:  Review of patient's allergies indicates:   Allergen Reactions    Egg derived      Egg white    Lactose        Medications:    Current Outpatient Medications:     diphenhydrAMINE (BENYLIN) 12.5 mg/5 mL liquid, Take by mouth 4 (four) times daily as needed for Allergies., Disp: , Rfl:     ibuprofen (ADVIL,MOTRIN) 100 mg/5 mL suspension, Take 15 mLs by mouth every 6 (six) hours as needed for Temperature greater than., Disp: , Rfl:     iron  "fum-B12-IF-C-folic acid (FOLTRIN) 110-0.5 mg capsule, Take 1 capsule by mouth 2 (two) times daily., Disp: , Rfl:     lactase (LACTAID) 3,000 unit tablet, Take 1 tablet by mouth 3 (three) times daily with meals., Disp: , Rfl:     Lactobacillus acidophilus (PROBIOTIC ACIDOPHILUS ORAL), Take by mouth., Disp: , Rfl:     metronidazole 0.75% (METROCREAM) 0.75 % Crea, Apply topically 2 (two) times daily., Disp: , Rfl:     polyethylene glycol (GLYCOLAX) 17 gram/dose powder, Take 17 g by mouth as needed., Disp: , Rfl:     polysaccharide iron complex (NOVAFERRUM 50) 50 mg iron Cap, Take 1 capsule by mouth once daily., Disp: 90 capsule, Rfl: 2    PMHx/PSHx Updates:  See patient's last visit with me on Visit date not found.  See H&P on 12/2/2022        Pathology:   Cancer Staging   No matching staging information was found for the patient.          Objective:     Vitals:  Blood pressure 109/72, pulse 71, temperature 97.9 °F (36.6 °C), resp. rate 16, height 5' 5" (1.651 m), weight 61.7 kg (136 lb).    Physical Examination:   GEN: no apparent distress, comfortable; AAOx3  HEAD: atraumatic and normocephalic  EYES: no pallor, no icterus, PERRLA  ENT: OMM, no pharyngeal erythema, external ears WNL; no nasal discharge; no thrush  NECK: no masses, thyroid normal, trachea midline, no LAD/LN's, supple  CV: RRR with no murmur; normal pulse; normal S1 and S2; no pedal edema  CHEST: Normal respiratory effort; CTAB; normal breath sounds; no wheeze or crackles  ABDOM: nontender and nondistended; soft; normal bowel sounds; no rebound/guarding  MUSC/Skeletal: ROM normal; no crepitus; joints normal; no deformities or arthropathy  EXTREM: no clubbing, cyanosis, inflammation or swelling  SKIN: no rashes, lesions, ulcers, petechiae or subcutaneous nodules  : no koch  NEURO: grossly intact; motor/sensory WNL; AAOx3; no tremors  PSYCH: normal mood, affect and behavior  LYMPH: normal cervical, supraclavicular, axillary and groin " LN's            Labs:     Lab Results   Component Value Date    WBC 4.11 12/05/2022    HGB 11.1 (L) 12/05/2022    HCT 35.8 (L) 12/05/2022    MCV 76 (L) 12/05/2022     12/05/2022       CMP  Sodium   Date Value Ref Range Status   12/05/2022 136 136 - 145 mmol/L Final     Potassium   Date Value Ref Range Status   12/05/2022 3.0 (L) 3.5 - 5.1 mmol/L Final     Chloride   Date Value Ref Range Status   12/05/2022 102 95 - 110 mmol/L Final     CO2   Date Value Ref Range Status   12/05/2022 27 23 - 29 mmol/L Final     Glucose   Date Value Ref Range Status   12/05/2022 78 70 - 110 mg/dL Final     BUN   Date Value Ref Range Status   12/05/2022 6 6 - 20 mg/dL Final     Creatinine   Date Value Ref Range Status   12/05/2022 0.5 0.5 - 1.4 mg/dL Final     Calcium   Date Value Ref Range Status   12/05/2022 8.8 8.7 - 10.5 mg/dL Final     Total Protein   Date Value Ref Range Status   12/05/2022 7.8 6.0 - 8.4 g/dL Final     Albumin   Date Value Ref Range Status   12/05/2022 4.0 3.5 - 5.2 g/dL Final     Total Bilirubin   Date Value Ref Range Status   12/05/2022 0.3 0.1 - 1.0 mg/dL Final     Comment:     For infants and newborns, interpretation of results should be based  on gestational age, weight and in agreement with clinical  observations.    Premature Infant recommended reference ranges:  Up to 24 hours.............<8.0 mg/dL  Up to 48 hours............<12.0 mg/dL  3-5 days..................<15.0 mg/dL  6-29 days.................<15.0 mg/dL       Alkaline Phosphatase   Date Value Ref Range Status   12/05/2022 68 55 - 135 U/L Final     AST   Date Value Ref Range Status   12/05/2022 22 10 - 40 U/L Final     ALT   Date Value Ref Range Status   12/05/2022 18 10 - 44 U/L Final     Anion Gap   Date Value Ref Range Status   12/05/2022 7 (L) 8 - 16 mmol/L Final     eGFR   Date Value Ref Range Status   12/05/2022 >60.0 >60 mL/min/1.73 m^2 Final         Lab Results   Component Value Date    IRON 13 (L) 12/05/2022    TRANSFERRIN 275  12/05/2022    [facility] 385 12/05/2022    FESATURATED 3 (L) 12/05/2022      Hgb F                          0.0              %        MB     Reference Range: 0.0-2.0                                 Hgb A                          97.7             %        MB     Reference Range: 96.4-98.8                               Hgb A2                         2.3              %        MB     Reference Range: 1.8-3.2                                 Hgb S                          0.0              %        MB     Reference Range: 0.0                                         Alpha-Thalassemia              Comment:                  TG     Test: Alpha-Thalassemia, DNA Analysis   Result:   Negative, alpha alpha/alpha alpha     Radiology/Diagnostic Studies:  US BREAST LEFT LIMITED     CLINICAL HISTORY:  Unspecified lump in the left breast, upper outer quadrant     TECHNIQUE:  CMS MANDATED QUALITY DATA - MAMMOGRAM - 225     [facility] utilizes a reminder system to ensure that all patients receive reminder letters and/or direct phone calls for appointments. This includes reminders for routine screening mammograms, diagnostic mammograms, and other breast imaging interventions when appropriate. This patient will be placed in the appropriate reminder system.     The interpretation was assisted by Computer Aided Detection with [employer].     COMPARISON:  None     FINDINGS:  Grayscale evaluation of the left breast 3-4 o'clock area of interest, 6 cm from the nipple was performed.  Of note, patient reports the palpable abnormality is intermittent, and does not feel it today.  Normal fibroglandular elements are evident in this region.  No suspicious cystic or solid mass.     Impression:     Negative targeted left breast ultrasound.     Clinical follow-up and screening mammography at age 40 recommended.     BI-RADS CATEGORY 1: NEGATIVE.    I have reviewed all available lab results and radiology reports.    Assessment/Plan:   (2) 17  y.o. female with diagnosis of iron deficiency anemia. She has been on oral iron for three months with only minimal improvements.   - orders placed for IV iron x 4  - recheck labs after 4 weeks and then follow up with Dr. Issa   - Thalessemia and hemoglobinopathy     1/11/2023:  - she is getting 4th IV iron today  - check labs monthly - will have done next week     (2) Gastritis   - follow up with GI   - takes a probiotic daily      (3) Autism/OCD            VISIT DIAGNOSES:      Iron deficiency anemia, unspecified iron deficiency anemia type          PLAN:  1. Continued on Venofer # 4 and recheck labs monthly (due next week)   2.  F/u with GYN, PCP  3. Follow up with GI as needed         RTC in  4-8 weeks       Fax note to Danielle Angel MD , CONNIE Malagon, and Thea    Discussion:     COVID-19 Discussion:    I had long discussion with patient and any applicable family about the COVID-19 coronavirus epidemic and the recommended precautions with regard to cancer and/or hematology patients. I have re-iterated the CDC recommendations for adequate hand washing, use of hand -like products, and coughing into elbow, etc. In addition, especially for our patients who are on chemotherapy and/or our otherwise immunocompromised patients, I have recommended avoidance of crowds, including movie theaters, restaurants, churches, etc. I have recommended avoidance of any sick or symptomatic family members and/or friends. I have also recommended avoidance of any raw and unwashed food products, and general avoidance of food items that have not been prepared by themselves. The patient has been asked to call us immediately with any symptom developments, issues, questions or other general concerns.       Iron Infusion Therapy Discussion:     I provided literature/learning materials on the particular IV iron regimen and discussed the potential side-effect profiles of the drug(s). I discussed the importance of  compliance with obtaining and monitoring requested lab work, and went over the potential risk for the development of anaphylactic shock, bronchospasm, dysrhythmia, liver and/or kidney damage, and respiratory/cardiovascular arrest and/or failure. I discussed the potential risks for development of alopecia, fevers, itching, chills and/or rigors, cold sensory issues, ringing in ears, vertigo and neuropathy, all of which are usually acute but sometimes could end up being chronic and life-long. I discussed the risks of hand-foot syndrome and rashes, and development of other autoimmune mediated processes such as pneumonitis and colitis which could be life threatening.     The patient's consent has been obtained to proceed with the IV iron therapy.The patient will be referred to [facility] for training and education on IV iron therapy, use of antiemetics and/or anti-diarrheals, use of NSAID's, potential IV iron therapy side-effects, and any specific recommendations and precautions with the particular IV iron agents.      I answered all of the patient's (and family's, if applicable) questions to the best of my ability and to their complete satisfaction. The patient acknowledged full understanding of the risks, recommendations and plan(s).     I spent over 25 mins of time with the patient. Reviewed results of the recently ordered labs, tests and studies; made directives with regards to the results. Over half of this time was spent couseling and coordinating care.    I have explained all of the above in detail and the patient understands all of the current recommendation(s). I have answered all of their questions to the best of my ability and to their complete satisfaction.   The patient is to continue with the current management plan.            Electronically signed by Gustavo Issa MD

## 2023-01-11 ENCOUNTER — OFFICE VISIT (OUTPATIENT)
Dept: HEMATOLOGY/ONCOLOGY | Facility: CLINIC | Age: 23
End: 2023-01-11
Payer: MEDICAID

## 2023-01-11 ENCOUNTER — TELEPHONE (OUTPATIENT)
Dept: HEMATOLOGY/ONCOLOGY | Facility: CLINIC | Age: 23
End: 2023-01-11

## 2023-01-11 ENCOUNTER — INFUSION (OUTPATIENT)
Dept: INFUSION THERAPY | Facility: HOSPITAL | Age: 23
End: 2023-01-11
Attending: INTERNAL MEDICINE
Payer: MEDICAID

## 2023-01-11 VITALS
HEART RATE: 71 BPM | BODY MASS INDEX: 22.66 KG/M2 | SYSTOLIC BLOOD PRESSURE: 109 MMHG | WEIGHT: 136 LBS | HEIGHT: 65 IN | DIASTOLIC BLOOD PRESSURE: 72 MMHG | RESPIRATION RATE: 16 BRPM | TEMPERATURE: 98 F

## 2023-01-11 VITALS
WEIGHT: 136 LBS | DIASTOLIC BLOOD PRESSURE: 72 MMHG | BODY MASS INDEX: 22.66 KG/M2 | HEART RATE: 71 BPM | HEIGHT: 65 IN | TEMPERATURE: 98 F | OXYGEN SATURATION: 100 % | SYSTOLIC BLOOD PRESSURE: 109 MMHG | RESPIRATION RATE: 18 BRPM

## 2023-01-11 DIAGNOSIS — D50.9 IRON DEFICIENCY ANEMIA, UNSPECIFIED IRON DEFICIENCY ANEMIA TYPE: Primary | ICD-10-CM

## 2023-01-11 DIAGNOSIS — D50.9 MICROCYTIC ANEMIA: ICD-10-CM

## 2023-01-11 PROCEDURE — 96367 TX/PROPH/DG ADDL SEQ IV INF: CPT

## 2023-01-11 PROCEDURE — 63600175 PHARM REV CODE 636 W HCPCS: Performed by: INTERNAL MEDICINE

## 2023-01-11 PROCEDURE — 1159F PR MEDICATION LIST DOCUMENTED IN MEDICAL RECORD: ICD-10-PCS | Mod: CPTII,S$GLB,, | Performed by: INTERNAL MEDICINE

## 2023-01-11 PROCEDURE — 96365 THER/PROPH/DIAG IV INF INIT: CPT

## 2023-01-11 PROCEDURE — 3074F PR MOST RECENT SYSTOLIC BLOOD PRESSURE < 130 MM HG: ICD-10-PCS | Mod: CPTII,S$GLB,, | Performed by: INTERNAL MEDICINE

## 2023-01-11 PROCEDURE — 99215 PR OFFICE/OUTPT VISIT, EST, LEVL V, 40-54 MIN: ICD-10-PCS | Mod: S$GLB,,, | Performed by: INTERNAL MEDICINE

## 2023-01-11 PROCEDURE — 25000003 PHARM REV CODE 250: Performed by: NURSE PRACTITIONER

## 2023-01-11 PROCEDURE — 1160F PR REVIEW ALL MEDS BY PRESCRIBER/CLIN PHARMACIST DOCUMENTED: ICD-10-PCS | Mod: CPTII,S$GLB,, | Performed by: INTERNAL MEDICINE

## 2023-01-11 PROCEDURE — 63600175 PHARM REV CODE 636 W HCPCS: Performed by: NURSE PRACTITIONER

## 2023-01-11 PROCEDURE — 99215 OFFICE O/P EST HI 40 MIN: CPT | Mod: S$GLB,,, | Performed by: INTERNAL MEDICINE

## 2023-01-11 PROCEDURE — 3008F BODY MASS INDEX DOCD: CPT | Mod: CPTII,S$GLB,, | Performed by: INTERNAL MEDICINE

## 2023-01-11 PROCEDURE — 25000003 PHARM REV CODE 250: Performed by: INTERNAL MEDICINE

## 2023-01-11 PROCEDURE — 3008F PR BODY MASS INDEX (BMI) DOCUMENTED: ICD-10-PCS | Mod: CPTII,S$GLB,, | Performed by: INTERNAL MEDICINE

## 2023-01-11 PROCEDURE — 3078F PR MOST RECENT DIASTOLIC BLOOD PRESSURE < 80 MM HG: ICD-10-PCS | Mod: CPTII,S$GLB,, | Performed by: INTERNAL MEDICINE

## 2023-01-11 PROCEDURE — 3074F SYST BP LT 130 MM HG: CPT | Mod: CPTII,S$GLB,, | Performed by: INTERNAL MEDICINE

## 2023-01-11 PROCEDURE — 3078F DIAST BP <80 MM HG: CPT | Mod: CPTII,S$GLB,, | Performed by: INTERNAL MEDICINE

## 2023-01-11 PROCEDURE — 1159F MED LIST DOCD IN RCRD: CPT | Mod: CPTII,S$GLB,, | Performed by: INTERNAL MEDICINE

## 2023-01-11 PROCEDURE — 1160F RVW MEDS BY RX/DR IN RCRD: CPT | Mod: CPTII,S$GLB,, | Performed by: INTERNAL MEDICINE

## 2023-01-11 RX ORDER — ACETAMINOPHEN 650 MG/20.3ML
650 LIQUID ORAL
Status: CANCELLED
Start: 2023-01-18 | End: 2023-01-18

## 2023-01-11 RX ORDER — DIPHENHYDRAMINE HYDROCHLORIDE 50 MG/ML
12.5 INJECTION INTRAMUSCULAR; INTRAVENOUS
Status: CANCELLED
Start: 2023-01-18

## 2023-01-11 RX ORDER — SODIUM CHLORIDE 0.9 % (FLUSH) 0.9 %
10 SYRINGE (ML) INJECTION
Status: DISCONTINUED | OUTPATIENT
Start: 2023-01-11 | End: 2023-01-11 | Stop reason: HOSPADM

## 2023-01-11 RX ORDER — DIPHENHYDRAMINE HYDROCHLORIDE 50 MG/ML
12.5 INJECTION INTRAMUSCULAR; INTRAVENOUS
Status: DISCONTINUED | OUTPATIENT
Start: 2023-01-11 | End: 2023-01-11

## 2023-01-11 RX ORDER — SODIUM CHLORIDE 0.9 % (FLUSH) 0.9 %
10 SYRINGE (ML) INJECTION
Status: CANCELLED | OUTPATIENT
Start: 2023-01-18

## 2023-01-11 RX ORDER — HEPARIN 100 UNIT/ML
500 SYRINGE INTRAVENOUS
Status: CANCELLED | OUTPATIENT
Start: 2023-01-18

## 2023-01-11 RX ORDER — ACETAMINOPHEN 650 MG/20.3ML
650 LIQUID ORAL
Status: COMPLETED | OUTPATIENT
Start: 2023-01-11 | End: 2023-01-11

## 2023-01-11 RX ADMIN — DIPHENHYDRAMINE HYDROCHLORIDE 12.5 MG: 50 INJECTION INTRAMUSCULAR; INTRAVENOUS at 01:01

## 2023-01-11 RX ADMIN — IRON SUCROSE 100 MG: 20 INJECTION, SOLUTION INTRAVENOUS at 01:01

## 2023-01-11 RX ADMIN — ACETAMINOPHEN 650 MG: 325 SUSPENSION ORAL at 01:01

## 2023-01-11 RX ADMIN — SODIUM CHLORIDE: 0.9 INJECTION, SOLUTION INTRAVENOUS at 01:01

## 2023-01-11 NOTE — PLAN OF CARE
Problem: Anemia  Goal: Anemia Symptom Improvement  Outcome: Ongoing, Progressing  Intervention: Monitor and Manage Anemia  Flowsheets (Taken 1/11/2023 1306)  Oral Nutrition Promotion: rest periods promoted  Safety Promotion/Fall Prevention: medications reviewed  Fatigue Management: frequent rest breaks encouraged      Requested prescription(s) has been sent to the pharmacy.

## 2023-01-17 ENCOUNTER — LAB VISIT (OUTPATIENT)
Dept: LAB | Facility: HOSPITAL | Age: 23
End: 2023-01-17
Attending: INTERNAL MEDICINE
Payer: MEDICAID

## 2023-01-17 ENCOUNTER — TELEPHONE (OUTPATIENT)
Dept: HEMATOLOGY/ONCOLOGY | Facility: CLINIC | Age: 23
End: 2023-01-17

## 2023-01-17 DIAGNOSIS — D50.9 MICROCYTIC ANEMIA: ICD-10-CM

## 2023-01-17 DIAGNOSIS — D50.9 IRON DEFICIENCY ANEMIA, UNSPECIFIED IRON DEFICIENCY ANEMIA TYPE: ICD-10-CM

## 2023-01-17 LAB
ALBUMIN SERPL BCP-MCNC: 4.1 G/DL (ref 3.5–5.2)
ALP SERPL-CCNC: 64 U/L (ref 55–135)
ALT SERPL W/O P-5'-P-CCNC: 25 U/L (ref 10–44)
ANION GAP SERPL CALC-SCNC: 6 MMOL/L (ref 8–16)
AST SERPL-CCNC: 20 U/L (ref 10–40)
BASOPHILS # BLD AUTO: 0.06 K/UL (ref 0–0.2)
BASOPHILS NFR BLD: 1.4 % (ref 0–1.9)
BILIRUB SERPL-MCNC: 0.5 MG/DL (ref 0.1–1)
BUN SERPL-MCNC: 9 MG/DL (ref 6–20)
CALCIUM SERPL-MCNC: 9.3 MG/DL (ref 8.7–10.5)
CHLORIDE SERPL-SCNC: 105 MMOL/L (ref 95–110)
CO2 SERPL-SCNC: 25 MMOL/L (ref 23–29)
CREAT SERPL-MCNC: 0.4 MG/DL (ref 0.5–1.4)
DIFFERENTIAL METHOD: ABNORMAL
EOSINOPHIL # BLD AUTO: 0.1 K/UL (ref 0–0.5)
EOSINOPHIL NFR BLD: 2.4 % (ref 0–8)
ERYTHROCYTE [DISTWIDTH] IN BLOOD BY AUTOMATED COUNT: 19.8 % (ref 11.5–14.5)
EST. GFR  (NO RACE VARIABLE): >60 ML/MIN/1.73 M^2
FERRITIN SERPL-MCNC: 60 NG/ML (ref 20–300)
GLUCOSE SERPL-MCNC: 89 MG/DL (ref 70–110)
HCT VFR BLD AUTO: 36.7 % (ref 37–48.5)
HGB BLD-MCNC: 11.6 G/DL (ref 12–16)
IMM GRANULOCYTES # BLD AUTO: 0.01 K/UL (ref 0–0.04)
IMM GRANULOCYTES NFR BLD AUTO: 0.2 % (ref 0–0.5)
IRON SERPL-MCNC: 120 UG/DL (ref 30–160)
LYMPHOCYTES # BLD AUTO: 2.2 K/UL (ref 1–4.8)
LYMPHOCYTES NFR BLD: 51.4 % (ref 18–48)
MCH RBC QN AUTO: 25.6 PG (ref 27–31)
MCHC RBC AUTO-ENTMCNC: 31.6 G/DL (ref 32–36)
MCV RBC AUTO: 81 FL (ref 82–98)
MONOCYTES # BLD AUTO: 0.3 K/UL (ref 0.3–1)
MONOCYTES NFR BLD: 8.1 % (ref 4–15)
NEUTROPHILS # BLD AUTO: 1.5 K/UL (ref 1.8–7.7)
NEUTROPHILS NFR BLD: 36.5 % (ref 38–73)
NRBC BLD-RTO: 0 /100 WBC
PLATELET # BLD AUTO: 321 K/UL (ref 150–450)
PMV BLD AUTO: 10 FL (ref 9.2–12.9)
POTASSIUM SERPL-SCNC: 3.7 MMOL/L (ref 3.5–5.1)
PROT SERPL-MCNC: 7 G/DL (ref 6–8.4)
RBC # BLD AUTO: 4.53 M/UL (ref 4–5.4)
SATURATED IRON: 31 % (ref 20–50)
SODIUM SERPL-SCNC: 136 MMOL/L (ref 136–145)
TOTAL IRON BINDING CAPACITY: 382 UG/DL (ref 250–450)
TRANSFERRIN SERPL-MCNC: 273 MG/DL (ref 200–375)
WBC # BLD AUTO: 4.18 K/UL (ref 3.9–12.7)

## 2023-01-17 PROCEDURE — 84466 ASSAY OF TRANSFERRIN: CPT | Performed by: INTERNAL MEDICINE

## 2023-01-17 PROCEDURE — 80053 COMPREHEN METABOLIC PANEL: CPT | Performed by: INTERNAL MEDICINE

## 2023-01-17 PROCEDURE — 36415 COLL VENOUS BLD VENIPUNCTURE: CPT | Performed by: INTERNAL MEDICINE

## 2023-01-17 PROCEDURE — 85025 COMPLETE CBC W/AUTO DIFF WBC: CPT | Performed by: INTERNAL MEDICINE

## 2023-01-17 PROCEDURE — 82728 ASSAY OF FERRITIN: CPT | Performed by: INTERNAL MEDICINE

## 2023-01-18 ENCOUNTER — PATIENT MESSAGE (OUTPATIENT)
Dept: HEMATOLOGY/ONCOLOGY | Facility: CLINIC | Age: 23
End: 2023-01-18

## 2023-01-18 ENCOUNTER — INFUSION (OUTPATIENT)
Dept: INFUSION THERAPY | Facility: HOSPITAL | Age: 23
End: 2023-01-18
Attending: INTERNAL MEDICINE
Payer: MEDICAID

## 2023-01-18 VITALS
DIASTOLIC BLOOD PRESSURE: 64 MMHG | WEIGHT: 138.5 LBS | TEMPERATURE: 97 F | HEIGHT: 65 IN | BODY MASS INDEX: 23.07 KG/M2 | HEART RATE: 80 BPM | OXYGEN SATURATION: 100 % | SYSTOLIC BLOOD PRESSURE: 109 MMHG | RESPIRATION RATE: 16 BRPM

## 2023-01-18 DIAGNOSIS — D50.9 IRON DEFICIENCY ANEMIA, UNSPECIFIED IRON DEFICIENCY ANEMIA TYPE: Primary | ICD-10-CM

## 2023-01-18 PROCEDURE — 63600175 PHARM REV CODE 636 W HCPCS: Performed by: NURSE PRACTITIONER

## 2023-01-18 PROCEDURE — 96365 THER/PROPH/DIAG IV INF INIT: CPT

## 2023-01-18 PROCEDURE — 25000003 PHARM REV CODE 250: Performed by: NURSE PRACTITIONER

## 2023-01-18 PROCEDURE — 96367 TX/PROPH/DG ADDL SEQ IV INF: CPT

## 2023-01-18 RX ORDER — SODIUM CHLORIDE 0.9 % (FLUSH) 0.9 %
10 SYRINGE (ML) INJECTION
Status: DISCONTINUED | OUTPATIENT
Start: 2023-01-18 | End: 2023-01-18 | Stop reason: HOSPADM

## 2023-01-18 RX ORDER — HEPARIN 100 UNIT/ML
500 SYRINGE INTRAVENOUS
Status: CANCELLED | OUTPATIENT
Start: 2023-01-25

## 2023-01-18 RX ORDER — ACETAMINOPHEN 650 MG/20.3ML
650 LIQUID ORAL
Status: COMPLETED | OUTPATIENT
Start: 2023-01-18 | End: 2023-01-18

## 2023-01-18 RX ORDER — DIPHENHYDRAMINE HYDROCHLORIDE 50 MG/ML
12.5 INJECTION INTRAMUSCULAR; INTRAVENOUS
Status: CANCELLED
Start: 2023-01-25

## 2023-01-18 RX ORDER — ACETAMINOPHEN 650 MG/20.3ML
650 LIQUID ORAL
Status: CANCELLED
Start: 2023-01-25 | End: 2023-01-25

## 2023-01-18 RX ORDER — SODIUM CHLORIDE 0.9 % (FLUSH) 0.9 %
10 SYRINGE (ML) INJECTION
Status: CANCELLED | OUTPATIENT
Start: 2023-01-25

## 2023-01-18 RX ADMIN — IRON SUCROSE 100 MG: 20 INJECTION, SOLUTION INTRAVENOUS at 02:01

## 2023-01-18 RX ADMIN — DIPHENHYDRAMINE HYDROCHLORIDE: 50 INJECTION INTRAMUSCULAR; INTRAVENOUS at 01:01

## 2023-01-18 RX ADMIN — ACETAMINOPHEN 650 MG: 325 SUSPENSION ORAL at 01:01

## 2023-01-18 RX ADMIN — SODIUM CHLORIDE: 0.9 INJECTION, SOLUTION INTRAVENOUS at 01:01

## 2023-01-18 NOTE — PLAN OF CARE
Problem: Fatigue  Goal: Improved Activity Tolerance  Outcome: Ongoing, Progressing  Intervention: Promote Improved Energy  Flowsheets (Taken 1/18/2023 1333)  Fatigue Management: frequent rest breaks encouraged

## 2023-01-25 ENCOUNTER — INFUSION (OUTPATIENT)
Dept: INFUSION THERAPY | Facility: HOSPITAL | Age: 23
End: 2023-01-25
Attending: INTERNAL MEDICINE
Payer: MEDICAID

## 2023-01-25 VITALS
RESPIRATION RATE: 18 BRPM | DIASTOLIC BLOOD PRESSURE: 54 MMHG | HEIGHT: 65 IN | OXYGEN SATURATION: 100 % | BODY MASS INDEX: 22.71 KG/M2 | SYSTOLIC BLOOD PRESSURE: 100 MMHG | TEMPERATURE: 97 F | HEART RATE: 69 BPM | WEIGHT: 136.31 LBS

## 2023-01-25 DIAGNOSIS — D50.9 IRON DEFICIENCY ANEMIA, UNSPECIFIED IRON DEFICIENCY ANEMIA TYPE: Primary | ICD-10-CM

## 2023-01-25 PROCEDURE — 96367 TX/PROPH/DG ADDL SEQ IV INF: CPT

## 2023-01-25 PROCEDURE — 63600175 PHARM REV CODE 636 W HCPCS: Performed by: NURSE PRACTITIONER

## 2023-01-25 PROCEDURE — 25000003 PHARM REV CODE 250: Performed by: NURSE PRACTITIONER

## 2023-01-25 PROCEDURE — 96365 THER/PROPH/DIAG IV INF INIT: CPT

## 2023-01-25 RX ORDER — SODIUM CHLORIDE 0.9 % (FLUSH) 0.9 %
10 SYRINGE (ML) INJECTION
Status: DISCONTINUED | OUTPATIENT
Start: 2023-01-25 | End: 2023-01-25 | Stop reason: HOSPADM

## 2023-01-25 RX ORDER — SODIUM CHLORIDE 0.9 % (FLUSH) 0.9 %
10 SYRINGE (ML) INJECTION
Status: CANCELLED | OUTPATIENT
Start: 2023-02-01

## 2023-01-25 RX ORDER — HEPARIN 100 UNIT/ML
500 SYRINGE INTRAVENOUS
Status: CANCELLED | OUTPATIENT
Start: 2023-02-01

## 2023-01-25 RX ORDER — DIPHENHYDRAMINE HYDROCHLORIDE 50 MG/ML
12.5 INJECTION INTRAMUSCULAR; INTRAVENOUS
Status: CANCELLED
Start: 2023-02-01

## 2023-01-25 RX ORDER — ACETAMINOPHEN 650 MG/20.3ML
650 LIQUID ORAL
Status: CANCELLED
Start: 2023-02-01 | End: 2023-02-01

## 2023-01-25 RX ORDER — ACETAMINOPHEN 650 MG/20.3ML
650 LIQUID ORAL
Status: COMPLETED | OUTPATIENT
Start: 2023-01-25 | End: 2023-01-25

## 2023-01-25 RX ADMIN — IRON SUCROSE 100 MG: 20 INJECTION, SOLUTION INTRAVENOUS at 01:01

## 2023-01-25 RX ADMIN — ACETAMINOPHEN 650 MG: 325 SUSPENSION ORAL at 01:01

## 2023-01-25 RX ADMIN — DIPHENHYDRAMINE HYDROCHLORIDE: 50 INJECTION INTRAMUSCULAR; INTRAVENOUS at 01:01

## 2023-01-25 RX ADMIN — SODIUM CHLORIDE: 0.9 INJECTION, SOLUTION INTRAVENOUS at 01:01

## 2023-02-01 ENCOUNTER — INFUSION (OUTPATIENT)
Dept: INFUSION THERAPY | Facility: HOSPITAL | Age: 23
End: 2023-02-01
Attending: INTERNAL MEDICINE
Payer: MEDICAID

## 2023-02-01 VITALS
BODY MASS INDEX: 23.24 KG/M2 | HEART RATE: 70 BPM | OXYGEN SATURATION: 100 % | SYSTOLIC BLOOD PRESSURE: 100 MMHG | WEIGHT: 139.5 LBS | DIASTOLIC BLOOD PRESSURE: 58 MMHG | HEIGHT: 65 IN | RESPIRATION RATE: 16 BRPM | TEMPERATURE: 98 F

## 2023-02-01 DIAGNOSIS — D50.9 IRON DEFICIENCY ANEMIA, UNSPECIFIED IRON DEFICIENCY ANEMIA TYPE: Primary | ICD-10-CM

## 2023-02-01 PROCEDURE — 25000003 PHARM REV CODE 250: Performed by: INTERNAL MEDICINE

## 2023-02-01 PROCEDURE — 63600175 PHARM REV CODE 636 W HCPCS: Performed by: INTERNAL MEDICINE

## 2023-02-01 PROCEDURE — 63600175 PHARM REV CODE 636 W HCPCS: Performed by: NURSE PRACTITIONER

## 2023-02-01 PROCEDURE — 96365 THER/PROPH/DIAG IV INF INIT: CPT

## 2023-02-01 PROCEDURE — 96367 TX/PROPH/DG ADDL SEQ IV INF: CPT

## 2023-02-01 PROCEDURE — 25000003 PHARM REV CODE 250: Performed by: NURSE PRACTITIONER

## 2023-02-01 RX ORDER — SODIUM CHLORIDE 0.9 % (FLUSH) 0.9 %
10 SYRINGE (ML) INJECTION
Status: CANCELLED | OUTPATIENT
Start: 2023-02-08

## 2023-02-01 RX ORDER — ACETAMINOPHEN 650 MG/20.3ML
650 LIQUID ORAL
Status: COMPLETED | OUTPATIENT
Start: 2023-02-01 | End: 2023-02-01

## 2023-02-01 RX ORDER — HEPARIN 100 UNIT/ML
500 SYRINGE INTRAVENOUS
Status: CANCELLED | OUTPATIENT
Start: 2023-02-08

## 2023-02-01 RX ORDER — SODIUM CHLORIDE 0.9 % (FLUSH) 0.9 %
10 SYRINGE (ML) INJECTION
Status: DISCONTINUED | OUTPATIENT
Start: 2023-02-01 | End: 2023-02-01 | Stop reason: HOSPADM

## 2023-02-01 RX ORDER — DIPHENHYDRAMINE HYDROCHLORIDE 50 MG/ML
12.5 INJECTION INTRAMUSCULAR; INTRAVENOUS
Status: DISCONTINUED | OUTPATIENT
Start: 2023-02-01 | End: 2023-02-01

## 2023-02-01 RX ORDER — ACETAMINOPHEN 650 MG/20.3ML
650 LIQUID ORAL
Status: CANCELLED
Start: 2023-02-08 | End: 2023-02-08

## 2023-02-01 RX ORDER — DIPHENHYDRAMINE HYDROCHLORIDE 50 MG/ML
12.5 INJECTION INTRAMUSCULAR; INTRAVENOUS
Status: CANCELLED
Start: 2023-02-08

## 2023-02-01 RX ADMIN — ACETAMINOPHEN 650 MG: 325 SUSPENSION ORAL at 01:02

## 2023-02-01 RX ADMIN — DIPHENHYDRAMINE HYDROCHLORIDE: 50 INJECTION INTRAMUSCULAR; INTRAVENOUS at 01:02

## 2023-02-01 RX ADMIN — SODIUM CHLORIDE: 0.9 INJECTION, SOLUTION INTRAVENOUS at 01:02

## 2023-02-01 RX ADMIN — IRON SUCROSE 100 MG: 20 INJECTION, SOLUTION INTRAVENOUS at 02:02

## 2023-02-08 ENCOUNTER — INFUSION (OUTPATIENT)
Dept: INFUSION THERAPY | Facility: HOSPITAL | Age: 23
End: 2023-02-08
Attending: INTERNAL MEDICINE
Payer: MEDICAID

## 2023-02-08 VITALS
SYSTOLIC BLOOD PRESSURE: 100 MMHG | HEART RATE: 72 BPM | TEMPERATURE: 98 F | RESPIRATION RATE: 15 BRPM | DIASTOLIC BLOOD PRESSURE: 60 MMHG | OXYGEN SATURATION: 100 % | HEIGHT: 65 IN | BODY MASS INDEX: 22.74 KG/M2 | WEIGHT: 136.5 LBS

## 2023-02-08 DIAGNOSIS — D50.9 IRON DEFICIENCY ANEMIA, UNSPECIFIED IRON DEFICIENCY ANEMIA TYPE: Primary | ICD-10-CM

## 2023-02-08 PROCEDURE — 96365 THER/PROPH/DIAG IV INF INIT: CPT

## 2023-02-08 PROCEDURE — 63600175 PHARM REV CODE 636 W HCPCS: Performed by: NURSE PRACTITIONER

## 2023-02-08 PROCEDURE — 25000003 PHARM REV CODE 250: Performed by: NURSE PRACTITIONER

## 2023-02-08 PROCEDURE — 96367 TX/PROPH/DG ADDL SEQ IV INF: CPT

## 2023-02-08 RX ORDER — SODIUM CHLORIDE 0.9 % (FLUSH) 0.9 %
10 SYRINGE (ML) INJECTION
Status: DISCONTINUED | OUTPATIENT
Start: 2023-02-08 | End: 2023-02-08 | Stop reason: HOSPADM

## 2023-02-08 RX ORDER — DIPHENHYDRAMINE HYDROCHLORIDE 50 MG/ML
12.5 INJECTION INTRAMUSCULAR; INTRAVENOUS
Status: CANCELLED
Start: 2023-02-15

## 2023-02-08 RX ORDER — HEPARIN 100 UNIT/ML
500 SYRINGE INTRAVENOUS
Status: CANCELLED | OUTPATIENT
Start: 2023-02-15

## 2023-02-08 RX ORDER — SODIUM CHLORIDE 0.9 % (FLUSH) 0.9 %
10 SYRINGE (ML) INJECTION
Status: CANCELLED | OUTPATIENT
Start: 2023-02-15

## 2023-02-08 RX ORDER — ACETAMINOPHEN 650 MG/20.3ML
650 LIQUID ORAL
Status: CANCELLED
Start: 2023-02-15 | End: 2023-02-15

## 2023-02-08 RX ORDER — ACETAMINOPHEN 650 MG/20.3ML
650 LIQUID ORAL
Status: COMPLETED | OUTPATIENT
Start: 2023-02-08 | End: 2023-02-08

## 2023-02-08 RX ADMIN — ACETAMINOPHEN 650 MG: 325 SUSPENSION ORAL at 01:02

## 2023-02-08 RX ADMIN — IRON SUCROSE 100 MG: 20 INJECTION, SOLUTION INTRAVENOUS at 01:02

## 2023-02-08 RX ADMIN — DIPHENHYDRAMINE HYDROCHLORIDE: 50 INJECTION INTRAMUSCULAR; INTRAVENOUS at 01:02

## 2023-02-08 NOTE — PLAN OF CARE
Problem: Anemia  Goal: Anemia Symptom Improvement  Outcome: Ongoing, Progressing  Intervention: Monitor and Manage Anemia  Flowsheets (Taken 2/8/2023 1420)  Oral Nutrition Promotion: rest periods promoted  Safety Promotion/Fall Prevention: medications reviewed  Fatigue Management:   fatigue-related activity identified   frequent rest breaks encouraged   paced activity encouraged

## 2023-02-15 ENCOUNTER — PATIENT MESSAGE (OUTPATIENT)
Dept: HEMATOLOGY/ONCOLOGY | Facility: CLINIC | Age: 23
End: 2023-02-15

## 2023-02-15 ENCOUNTER — INFUSION (OUTPATIENT)
Dept: INFUSION THERAPY | Facility: HOSPITAL | Age: 23
End: 2023-02-15
Attending: INTERNAL MEDICINE
Payer: MEDICAID

## 2023-02-15 VITALS
BODY MASS INDEX: 22.95 KG/M2 | OXYGEN SATURATION: 98 % | HEART RATE: 74 BPM | DIASTOLIC BLOOD PRESSURE: 57 MMHG | SYSTOLIC BLOOD PRESSURE: 113 MMHG | RESPIRATION RATE: 15 BRPM | TEMPERATURE: 98 F | WEIGHT: 137.88 LBS

## 2023-02-15 DIAGNOSIS — D50.9 IRON DEFICIENCY ANEMIA, UNSPECIFIED IRON DEFICIENCY ANEMIA TYPE: Primary | ICD-10-CM

## 2023-02-15 PROCEDURE — 63600175 PHARM REV CODE 636 W HCPCS: Performed by: NURSE PRACTITIONER

## 2023-02-15 PROCEDURE — 96365 THER/PROPH/DIAG IV INF INIT: CPT

## 2023-02-15 PROCEDURE — 25000003 PHARM REV CODE 250: Performed by: NURSE PRACTITIONER

## 2023-02-15 PROCEDURE — 96367 TX/PROPH/DG ADDL SEQ IV INF: CPT

## 2023-02-15 RX ORDER — SODIUM CHLORIDE 0.9 % (FLUSH) 0.9 %
10 SYRINGE (ML) INJECTION
Status: DISCONTINUED | OUTPATIENT
Start: 2023-02-15 | End: 2023-02-15 | Stop reason: HOSPADM

## 2023-02-15 RX ORDER — ACETAMINOPHEN 650 MG/20.3ML
650 LIQUID ORAL
Status: COMPLETED | OUTPATIENT
Start: 2023-02-15 | End: 2023-02-15

## 2023-02-15 RX ORDER — DIPHENHYDRAMINE HYDROCHLORIDE 50 MG/ML
12.5 INJECTION INTRAMUSCULAR; INTRAVENOUS
Status: CANCELLED
Start: 2023-02-22

## 2023-02-15 RX ORDER — HEPARIN 100 UNIT/ML
500 SYRINGE INTRAVENOUS
Status: CANCELLED | OUTPATIENT
Start: 2023-02-22

## 2023-02-15 RX ORDER — ACETAMINOPHEN 650 MG/20.3ML
650 LIQUID ORAL
Status: CANCELLED
Start: 2023-02-22 | End: 2023-02-22

## 2023-02-15 RX ORDER — SODIUM CHLORIDE 0.9 % (FLUSH) 0.9 %
10 SYRINGE (ML) INJECTION
Status: CANCELLED | OUTPATIENT
Start: 2023-02-22

## 2023-02-15 RX ADMIN — SODIUM CHLORIDE: 0.9 INJECTION, SOLUTION INTRAVENOUS at 01:02

## 2023-02-15 RX ADMIN — IRON SUCROSE 100 MG: 20 INJECTION, SOLUTION INTRAVENOUS at 02:02

## 2023-02-15 RX ADMIN — DIPHENHYDRAMINE HYDROCHLORIDE: 50 INJECTION INTRAMUSCULAR; INTRAVENOUS at 01:02

## 2023-02-15 RX ADMIN — ACETAMINOPHEN 650 MG: 325 SUSPENSION ORAL at 01:02

## 2023-02-23 ENCOUNTER — LAB VISIT (OUTPATIENT)
Dept: LAB | Facility: HOSPITAL | Age: 23
End: 2023-02-23
Attending: INTERNAL MEDICINE
Payer: MEDICAID

## 2023-02-23 DIAGNOSIS — D50.9 MICROCYTIC ANEMIA: ICD-10-CM

## 2023-02-23 DIAGNOSIS — D50.9 IRON DEFICIENCY ANEMIA, UNSPECIFIED IRON DEFICIENCY ANEMIA TYPE: ICD-10-CM

## 2023-02-23 LAB
ALBUMIN SERPL BCP-MCNC: 4.2 G/DL (ref 3.5–5.2)
ALP SERPL-CCNC: 61 U/L (ref 55–135)
ALT SERPL W/O P-5'-P-CCNC: 19 U/L (ref 10–44)
ANION GAP SERPL CALC-SCNC: 8 MMOL/L (ref 8–16)
AST SERPL-CCNC: 17 U/L (ref 10–40)
BASOPHILS # BLD AUTO: 0.04 K/UL (ref 0–0.2)
BASOPHILS NFR BLD: 0.8 % (ref 0–1.9)
BILIRUB SERPL-MCNC: 0.3 MG/DL (ref 0.1–1)
BUN SERPL-MCNC: 8 MG/DL (ref 6–20)
CALCIUM SERPL-MCNC: 9.3 MG/DL (ref 8.7–10.5)
CHLORIDE SERPL-SCNC: 101 MMOL/L (ref 95–110)
CO2 SERPL-SCNC: 25 MMOL/L (ref 23–29)
CREAT SERPL-MCNC: 0.6 MG/DL (ref 0.5–1.4)
DIFFERENTIAL METHOD: ABNORMAL
EOSINOPHIL # BLD AUTO: 0.1 K/UL (ref 0–0.5)
EOSINOPHIL NFR BLD: 1.9 % (ref 0–8)
ERYTHROCYTE [DISTWIDTH] IN BLOOD BY AUTOMATED COUNT: 19 % (ref 11.5–14.5)
EST. GFR  (NO RACE VARIABLE): >60 ML/MIN/1.73 M^2
FERRITIN SERPL-MCNC: 77 NG/ML (ref 20–300)
GLUCOSE SERPL-MCNC: 83 MG/DL (ref 70–110)
HCT VFR BLD AUTO: 39.6 % (ref 37–48.5)
HGB BLD-MCNC: 12.9 G/DL (ref 12–16)
IMM GRANULOCYTES # BLD AUTO: 0.01 K/UL (ref 0–0.04)
IMM GRANULOCYTES NFR BLD AUTO: 0.2 % (ref 0–0.5)
IRON SERPL-MCNC: 84 UG/DL (ref 30–160)
LYMPHOCYTES # BLD AUTO: 2.3 K/UL (ref 1–4.8)
LYMPHOCYTES NFR BLD: 44.8 % (ref 18–48)
MCH RBC QN AUTO: 27.6 PG (ref 27–31)
MCHC RBC AUTO-ENTMCNC: 32.6 G/DL (ref 32–36)
MCV RBC AUTO: 85 FL (ref 82–98)
MONOCYTES # BLD AUTO: 0.4 K/UL (ref 0.3–1)
MONOCYTES NFR BLD: 7.9 % (ref 4–15)
NEUTROPHILS # BLD AUTO: 2.3 K/UL (ref 1.8–7.7)
NEUTROPHILS NFR BLD: 44.4 % (ref 38–73)
NRBC BLD-RTO: 0 /100 WBC
PLATELET # BLD AUTO: 288 K/UL (ref 150–450)
PMV BLD AUTO: 10.3 FL (ref 9.2–12.9)
POTASSIUM SERPL-SCNC: 3.7 MMOL/L (ref 3.5–5.1)
PROT SERPL-MCNC: 7.3 G/DL (ref 6–8.4)
RBC # BLD AUTO: 4.68 M/UL (ref 4–5.4)
SATURATED IRON: 28 % (ref 20–50)
SODIUM SERPL-SCNC: 134 MMOL/L (ref 136–145)
TOTAL IRON BINDING CAPACITY: 301 UG/DL (ref 250–450)
TRANSFERRIN SERPL-MCNC: 215 MG/DL (ref 200–375)
WBC # BLD AUTO: 5.18 K/UL (ref 3.9–12.7)

## 2023-02-23 PROCEDURE — 82728 ASSAY OF FERRITIN: CPT | Performed by: INTERNAL MEDICINE

## 2023-02-23 PROCEDURE — 80053 COMPREHEN METABOLIC PANEL: CPT | Performed by: INTERNAL MEDICINE

## 2023-02-23 PROCEDURE — 85025 COMPLETE CBC W/AUTO DIFF WBC: CPT | Performed by: INTERNAL MEDICINE

## 2023-02-23 PROCEDURE — 36415 COLL VENOUS BLD VENIPUNCTURE: CPT | Performed by: INTERNAL MEDICINE

## 2023-02-23 PROCEDURE — 84466 ASSAY OF TRANSFERRIN: CPT | Performed by: INTERNAL MEDICINE

## 2023-03-01 ENCOUNTER — INFUSION (OUTPATIENT)
Dept: INFUSION THERAPY | Facility: HOSPITAL | Age: 23
End: 2023-03-01
Attending: INTERNAL MEDICINE
Payer: MEDICAID

## 2023-03-01 ENCOUNTER — PATIENT MESSAGE (OUTPATIENT)
Dept: HEMATOLOGY/ONCOLOGY | Facility: CLINIC | Age: 23
End: 2023-03-01

## 2023-03-01 VITALS
HEART RATE: 68 BPM | TEMPERATURE: 98 F | DIASTOLIC BLOOD PRESSURE: 59 MMHG | OXYGEN SATURATION: 99 % | HEIGHT: 65 IN | BODY MASS INDEX: 22.99 KG/M2 | SYSTOLIC BLOOD PRESSURE: 100 MMHG | WEIGHT: 138 LBS | RESPIRATION RATE: 17 BRPM

## 2023-03-01 DIAGNOSIS — D50.9 IRON DEFICIENCY ANEMIA, UNSPECIFIED IRON DEFICIENCY ANEMIA TYPE: Primary | ICD-10-CM

## 2023-03-01 PROCEDURE — 25000003 PHARM REV CODE 250: Performed by: NURSE PRACTITIONER

## 2023-03-01 PROCEDURE — 96365 THER/PROPH/DIAG IV INF INIT: CPT

## 2023-03-01 PROCEDURE — 96367 TX/PROPH/DG ADDL SEQ IV INF: CPT

## 2023-03-01 PROCEDURE — 63600175 PHARM REV CODE 636 W HCPCS: Performed by: NURSE PRACTITIONER

## 2023-03-01 RX ORDER — ACETAMINOPHEN 650 MG/20.3ML
650 LIQUID ORAL
Status: COMPLETED | OUTPATIENT
Start: 2023-03-01 | End: 2023-03-01

## 2023-03-01 RX ORDER — SODIUM CHLORIDE 0.9 % (FLUSH) 0.9 %
10 SYRINGE (ML) INJECTION
Status: DISCONTINUED | OUTPATIENT
Start: 2023-03-01 | End: 2023-03-01 | Stop reason: HOSPADM

## 2023-03-01 RX ORDER — HEPARIN 100 UNIT/ML
500 SYRINGE INTRAVENOUS
OUTPATIENT
Start: 2023-03-08

## 2023-03-01 RX ORDER — ACETAMINOPHEN 650 MG/20.3ML
650 LIQUID ORAL
Start: 2023-03-08 | End: 2023-03-08

## 2023-03-01 RX ORDER — SODIUM CHLORIDE 0.9 % (FLUSH) 0.9 %
10 SYRINGE (ML) INJECTION
Status: CANCELLED | OUTPATIENT
Start: 2023-03-08

## 2023-03-01 RX ORDER — DIPHENHYDRAMINE HYDROCHLORIDE 50 MG/ML
12.5 INJECTION INTRAMUSCULAR; INTRAVENOUS
Start: 2023-03-08

## 2023-03-01 RX ADMIN — SODIUM CHLORIDE: 0.9 INJECTION, SOLUTION INTRAVENOUS at 01:03

## 2023-03-01 RX ADMIN — DIPHENHYDRAMINE HYDROCHLORIDE: 50 INJECTION INTRAMUSCULAR; INTRAVENOUS at 01:03

## 2023-03-01 RX ADMIN — ACETAMINOPHEN 650 MG: 325 SUSPENSION ORAL at 01:03

## 2023-03-01 RX ADMIN — IRON SUCROSE 100 MG: 20 INJECTION, SOLUTION INTRAVENOUS at 01:03

## 2023-03-07 NOTE — PROGRESS NOTES
"[facility] Hematology/Oncology  PROGRESS NOTE - Follow-up Visit      Subjective:       Patient ID:   NAME: Alanna Hammer : 2000     22 y.o. female    Referring Doc: Danielle Angel MD  Other Physicians: Dr. Costa, Dr. Conteh, Dr. Diane,             Chief Complaint: Iron Deficiency Anemia  f/u     History of Present Illness:     Patient returns today for a regularly scheduled follow-up visit.  The patient is here today to go over the results of the recently ordered labs, tests and studies. She is here by herself today.     She states she has been feeling "fine" and is no longer craving ice; she is no longer feeling "cold" all the time         No CP, SOB, HA's or N/V     She last had IV a week ago  - 2023        ROS:   GEN: normal without any fever, night sweats or weight loss;  more energy and no longer craving ice  HEENT: normal with no HA's, sore throat, stiff neck, changes in vision  CV: normal with no CP, SOB, PND, DAVIS or orthopnea  PULM: normal with no SOB, cough, hemoptysis, sputum or pleuritic pain  GI: normal with no abdominal pain, nausea, vomiting, constipation, diarrhea, melanotic stools, BRBPR, or hematemesis  : normal with no hematuria, dysuria  BREAST: normal with no mass, discharge, pain  SKIN: normal with no rash, erythema, bruising, or swelling    Pain Scale: 0     Allergies:  Review of patient's allergies indicates:   Allergen Reactions    Egg derived      Egg white    Lactose        Medications:    Current Outpatient Medications:     diphenhydrAMINE (BENYLIN) 12.5 mg/5 mL liquid, Take by mouth 4 (four) times daily as needed for Allergies., Disp: , Rfl:     ibuprofen (ADVIL,MOTRIN) 100 mg/5 mL suspension, Take 15 mLs by mouth every 6 (six) hours as needed for Temperature greater than., Disp: , Rfl:     iron fum-B12-IF-C-folic acid (FOLTRIN) 110-0.5 mg capsule, Take 1 capsule by mouth 2 (two) times daily., Disp: , Rfl:     lactase (LACTAID) 3,000 unit tablet, Take " "1 tablet by mouth 3 (three) times daily with meals., Disp: , Rfl:     Lactobacillus acidophilus (PROBIOTIC ACIDOPHILUS ORAL), Take by mouth., Disp: , Rfl:     metronidazole 0.75% (METROCREAM) 0.75 % Crea, Apply topically 2 (two) times daily., Disp: , Rfl:     polyethylene glycol (GLYCOLAX) 17 gram/dose powder, Take 17 g by mouth as needed., Disp: , Rfl:     polysaccharide iron complex (NOVAFERRUM 50) 50 mg iron Cap, Take 1 capsule by mouth once daily., Disp: 90 capsule, Rfl: 2    PMHx/PSHx Updates:  See patient's last visit with me on 1/11/2023  See H&P on 12/2/2022        Pathology:   Cancer Staging   No matching staging information was found for the patient.          Objective:     Vitals:  Blood pressure (!) 115/57, pulse 78, temperature 98 °F (36.7 °C), resp. rate 18, height 5' 5" (1.651 m), weight 61.7 kg (136 lb).    Physical Examination:   GEN: no apparent distress, comfortable; AAOx3  HEAD: atraumatic and normocephalic  EYES: no pallor, no icterus, PERRLA  ENT: OMM, no pharyngeal erythema, external ears WNL; no nasal discharge; no thrush  NECK: no masses, thyroid normal, trachea midline, no LAD/LN's, supple  CV: RRR with no murmur; normal pulse; normal S1 and S2; no pedal edema  CHEST: Normal respiratory effort; CTAB; normal breath sounds; no wheeze or crackles  ABDOM: nontender and nondistended; soft; normal bowel sounds; no rebound/guarding  MUSC/Skeletal: ROM normal; no crepitus; joints normal; no deformities or arthropathy  EXTREM: no clubbing, cyanosis, inflammation or swelling  SKIN: no rashes, lesions, ulcers, petechiae or subcutaneous nodules  : no koch  NEURO: grossly intact; motor/sensory WNL; AAOx3; no tremors  PSYCH: normal mood, affect and behavior  LYMPH: normal cervical, supraclavicular, axillary and groin LN's            Labs:     Lab Results   Component Value Date    WBC 5.18 02/23/2023    HGB 12.9 02/23/2023    HCT 39.6 02/23/2023    MCV 85 02/23/2023     02/23/2023 "       CMP  Sodium   Date Value Ref Range Status   02/23/2023 134 (L) 136 - 145 mmol/L Final     Potassium   Date Value Ref Range Status   02/23/2023 3.7 3.5 - 5.1 mmol/L Final     Chloride   Date Value Ref Range Status   02/23/2023 101 95 - 110 mmol/L Final     CO2   Date Value Ref Range Status   02/23/2023 25 23 - 29 mmol/L Final     Glucose   Date Value Ref Range Status   02/23/2023 83 70 - 110 mg/dL Final     BUN   Date Value Ref Range Status   02/23/2023 8 6 - 20 mg/dL Final     Creatinine   Date Value Ref Range Status   02/23/2023 0.6 0.5 - 1.4 mg/dL Final     Calcium   Date Value Ref Range Status   02/23/2023 9.3 8.7 - 10.5 mg/dL Final     Total Protein   Date Value Ref Range Status   02/23/2023 7.3 6.0 - 8.4 g/dL Final     Albumin   Date Value Ref Range Status   02/23/2023 4.2 3.5 - 5.2 g/dL Final     Total Bilirubin   Date Value Ref Range Status   02/23/2023 0.3 0.1 - 1.0 mg/dL Final     Comment:     For infants and newborns, interpretation of results should be based  on gestational age, weight and in agreement with clinical  observations.    Premature Infant recommended reference ranges:  Up to 24 hours.............<8.0 mg/dL  Up to 48 hours............<12.0 mg/dL  3-5 days..................<15.0 mg/dL  6-29 days.................<15.0 mg/dL       Alkaline Phosphatase   Date Value Ref Range Status   02/23/2023 61 55 - 135 U/L Final     AST   Date Value Ref Range Status   02/23/2023 17 10 - 40 U/L Final     ALT   Date Value Ref Range Status   02/23/2023 19 10 - 44 U/L Final     Anion Gap   Date Value Ref Range Status   02/23/2023 8 8 - 16 mmol/L Final     eGFR   Date Value Ref Range Status   02/23/2023 >60.0 >60 mL/min/1.73 m^2 Final         Lab Results   Component Value Date    IRON 84 02/23/2023    TRANSFERRIN 215 02/23/2023    TIBC 301 02/23/2023    FESATURATED 28 02/23/2023      Ferritin 20.0 - 300.0 ng/mL 77          Hgb F                          0.0              %        MB     Reference Range: 0.0-2.0                                  Hgb A                          97.7             %        MB     Reference Range: 96.4-98.8                               Hgb A2                         2.3              %        MB     Reference Range: 1.8-3.2                                 Hgb S                          0.0              %        MB     Reference Range: 0.0                                         Alpha-Thalassemia              Comment:                  TG     Test: Alpha-Thalassemia, DNA Analysis   Result:   Negative, alpha alpha/alpha alpha     Radiology/Diagnostic Studies:  US BREAST LEFT LIMITED     CLINICAL HISTORY:  Unspecified lump in the left breast, upper outer quadrant     TECHNIQUE:  CMS MANDATED QUALITY DATA - MAMMOGRAM - 225     This [facility] utilizes a reminder system to ensure that all patients receive reminder letters and/or direct phone calls for appointments. This includes reminders for routine screening mammograms, diagnostic mammograms, and other breast imaging interventions when appropriate. This patient will be placed in the appropriate reminder system.     The interpretation was assisted by Computer Aided Detection with [email or web address].     COMPARISON:  None     FINDINGS:  Grayscale evaluation of the left breast 3-4 o'clock area of interest, 6 cm from the nipple was performed.  Of note, patient reports the palpable abnormality is intermittent, and does not feel it today.  Normal fibroglandular elements are evident in this region.  No suspicious cystic or solid mass.     Impression:     Negative targeted left breast ultrasound.     Clinical follow-up and screening mammography at age 40 recommended.     BI-RADS CATEGORY 1: NEGATIVE.    I have reviewed all available lab results and radiology reports.    Assessment/Plan:   (1) 22 y.o. female with diagnosis of iron deficiency anemia. She has been on oral iron for three months with only minimal improvements.   - orders placed for IV iron  x 4  - recheck labs after 4 weeks and then follow up with Dr. Issa   - Thalessemia and hemoglobinopathy     1/11/2023:  - she is getting 4th IV iron today  - check labs monthly - will have done next week    3/8/2023:  - s/p last IV iron was last week  - latest labs are good with hgb WNL; iron panel now WNL     (2) Gastritis   - follow up with GI   - takes a probiotic daily      (3) Autism/OCD            VISIT DIAGNOSES:      Iron deficiency anemia, unspecified iron deficiency anemia type    Mass of upper outer quadrant of left breast          PLAN:  1. Check labs monthly and resume IV iron as needed  2.  F/u with GYN, PCP  3. Follow up with GI as needed         RTC in  12 weeks       Fax note to Danielle Angel MD , CONNIE Malagon, and Thea    Discussion:     COVID-19 Discussion:    I had long discussion with patient and any applicable family about the COVID-19 coronavirus epidemic and the recommended precautions with regard to cancer and/or hematology patients. I have re-iterated the CDC recommendations for adequate hand washing, use of hand -like products, and coughing into elbow, etc. In addition, especially for our patients who are on chemotherapy and/or our otherwise immunocompromised patients, I have recommended avoidance of crowds, including movie theaters, restaurants, churches, etc. I have recommended avoidance of any sick or symptomatic family members and/or friends. I have also recommended avoidance of any raw and unwashed food products, and general avoidance of food items that have not been prepared by themselves. The patient has been asked to call us immediately with any symptom developments, issues, questions or other general concerns.       Iron Infusion Therapy Discussion:     I provided literature/learning materials on the particular IV iron regimen and discussed the potential side-effect profiles of the drug(s). I discussed the importance of compliance with obtaining and  monitoring requested lab work, and went over the potential risk for the development of anaphylactic shock, bronchospasm, dysrhythmia, liver and/or kidney damage, and respiratory/cardiovascular arrest and/or failure. I discussed the potential risks for development of alopecia, fevers, itching, chills and/or rigors, cold sensory issues, ringing in ears, vertigo and neuropathy, all of which are usually acute but sometimes could end up being chronic and life-long. I discussed the risks of hand-foot syndrome and rashes, and development of other autoimmune mediated processes such as pneumonitis and colitis which could be life threatening.     The patient's consent has been obtained to proceed with the IV iron therapy.The patient will be referred to [facility] for training and education on IV iron therapy, use of antiemetics and/or anti-diarrheals, use of NSAID's, potential IV iron therapy side-effects, and any specific recommendations and precautions with the particular IV iron agents.      I answered all of the patient's (and family's, if applicable) questions to the best of my ability and to their complete satisfaction. The patient acknowledged full understanding of the risks, recommendations and plan(s).     I spent over 25 mins of time with the patient. Reviewed results of the recently ordered labs, tests and studies; made directives with regards to the results. Over half of this time was spent couseling and coordinating care.    I have explained all of the above in detail and the patient understands all of the current recommendation(s). I have answered all of their questions to the best of my ability and to their complete satisfaction.   The patient is to continue with the current management plan.            Electronically signed by Gustavo Issa MD

## 2023-03-08 ENCOUNTER — OFFICE VISIT (OUTPATIENT)
Dept: HEMATOLOGY/ONCOLOGY | Facility: CLINIC | Age: 23
End: 2023-03-08
Payer: MEDICAID

## 2023-03-08 VITALS
BODY MASS INDEX: 22.66 KG/M2 | SYSTOLIC BLOOD PRESSURE: 115 MMHG | WEIGHT: 136 LBS | TEMPERATURE: 98 F | RESPIRATION RATE: 18 BRPM | DIASTOLIC BLOOD PRESSURE: 57 MMHG | HEART RATE: 78 BPM | HEIGHT: 65 IN

## 2023-03-08 DIAGNOSIS — D50.9 IRON DEFICIENCY ANEMIA, UNSPECIFIED IRON DEFICIENCY ANEMIA TYPE: Primary | ICD-10-CM

## 2023-03-08 DIAGNOSIS — N63.21 MASS OF UPPER OUTER QUADRANT OF LEFT BREAST: ICD-10-CM

## 2023-03-08 PROCEDURE — 3008F BODY MASS INDEX DOCD: CPT | Mod: CPTII,S$GLB,, | Performed by: INTERNAL MEDICINE

## 2023-03-08 PROCEDURE — 99213 OFFICE O/P EST LOW 20 MIN: CPT | Mod: S$GLB,,, | Performed by: INTERNAL MEDICINE

## 2023-03-08 PROCEDURE — 3078F DIAST BP <80 MM HG: CPT | Mod: CPTII,S$GLB,, | Performed by: INTERNAL MEDICINE

## 2023-03-08 PROCEDURE — 3074F SYST BP LT 130 MM HG: CPT | Mod: CPTII,S$GLB,, | Performed by: INTERNAL MEDICINE

## 2023-03-08 PROCEDURE — 99213 PR OFFICE/OUTPT VISIT, EST, LEVL III, 20-29 MIN: ICD-10-PCS | Mod: S$GLB,,, | Performed by: INTERNAL MEDICINE

## 2023-03-08 PROCEDURE — 3078F PR MOST RECENT DIASTOLIC BLOOD PRESSURE < 80 MM HG: ICD-10-PCS | Mod: CPTII,S$GLB,, | Performed by: INTERNAL MEDICINE

## 2023-03-08 PROCEDURE — 1159F PR MEDICATION LIST DOCUMENTED IN MEDICAL RECORD: ICD-10-PCS | Mod: CPTII,S$GLB,, | Performed by: INTERNAL MEDICINE

## 2023-03-08 PROCEDURE — 1159F MED LIST DOCD IN RCRD: CPT | Mod: CPTII,S$GLB,, | Performed by: INTERNAL MEDICINE

## 2023-03-08 PROCEDURE — 3008F PR BODY MASS INDEX (BMI) DOCUMENTED: ICD-10-PCS | Mod: CPTII,S$GLB,, | Performed by: INTERNAL MEDICINE

## 2023-03-08 PROCEDURE — 1160F RVW MEDS BY RX/DR IN RCRD: CPT | Mod: CPTII,S$GLB,, | Performed by: INTERNAL MEDICINE

## 2023-03-08 PROCEDURE — 1160F PR REVIEW ALL MEDS BY PRESCRIBER/CLIN PHARMACIST DOCUMENTED: ICD-10-PCS | Mod: CPTII,S$GLB,, | Performed by: INTERNAL MEDICINE

## 2023-03-08 PROCEDURE — 3074F PR MOST RECENT SYSTOLIC BLOOD PRESSURE < 130 MM HG: ICD-10-PCS | Mod: CPTII,S$GLB,, | Performed by: INTERNAL MEDICINE

## 2023-05-22 ENCOUNTER — TELEPHONE (OUTPATIENT)
Dept: HEMATOLOGY/ONCOLOGY | Facility: CLINIC | Age: 23
End: 2023-05-22

## 2023-05-22 NOTE — TELEPHONE ENCOUNTER
I spoke with pt mother and reminded her to have pt do lab work prior to appt here on 5/31/23 she states that they are out of town and would like to r/s. I sent Conchita a message to r/s appt.

## 2023-05-30 ENCOUNTER — LAB VISIT (OUTPATIENT)
Dept: LAB | Facility: HOSPITAL | Age: 23
End: 2023-05-30
Attending: INTERNAL MEDICINE
Payer: MEDICAID

## 2023-05-30 DIAGNOSIS — D50.9 IRON DEFICIENCY ANEMIA, UNSPECIFIED IRON DEFICIENCY ANEMIA TYPE: ICD-10-CM

## 2023-05-30 DIAGNOSIS — D50.9 MICROCYTIC ANEMIA: ICD-10-CM

## 2023-05-30 LAB
ALBUMIN SERPL BCP-MCNC: 4.1 G/DL (ref 3.5–5.2)
ALP SERPL-CCNC: 61 U/L (ref 55–135)
ALT SERPL W/O P-5'-P-CCNC: 13 U/L (ref 10–44)
ANION GAP SERPL CALC-SCNC: 6 MMOL/L (ref 8–16)
AST SERPL-CCNC: 17 U/L (ref 10–40)
BASOPHILS # BLD AUTO: 0.05 K/UL (ref 0–0.2)
BASOPHILS NFR BLD: 1.2 % (ref 0–1.9)
BILIRUB SERPL-MCNC: 0.7 MG/DL (ref 0.1–1)
BUN SERPL-MCNC: 8 MG/DL (ref 6–20)
CALCIUM SERPL-MCNC: 9.3 MG/DL (ref 8.7–10.5)
CHLORIDE SERPL-SCNC: 104 MMOL/L (ref 95–110)
CO2 SERPL-SCNC: 26 MMOL/L (ref 23–29)
CREAT SERPL-MCNC: 0.5 MG/DL (ref 0.5–1.4)
DIFFERENTIAL METHOD: ABNORMAL
EOSINOPHIL # BLD AUTO: 0.1 K/UL (ref 0–0.5)
EOSINOPHIL NFR BLD: 2.6 % (ref 0–8)
ERYTHROCYTE [DISTWIDTH] IN BLOOD BY AUTOMATED COUNT: 13.2 % (ref 11.5–14.5)
EST. GFR  (NO RACE VARIABLE): >60 ML/MIN/1.73 M^2
FERRITIN SERPL-MCNC: 26 NG/ML (ref 20–300)
GLUCOSE SERPL-MCNC: 109 MG/DL (ref 70–110)
HCT VFR BLD AUTO: 39.1 % (ref 37–48.5)
HGB BLD-MCNC: 13 G/DL (ref 12–16)
IMM GRANULOCYTES # BLD AUTO: 0.01 K/UL (ref 0–0.04)
IMM GRANULOCYTES NFR BLD AUTO: 0.2 % (ref 0–0.5)
IRON SERPL-MCNC: 44 UG/DL (ref 30–160)
LYMPHOCYTES # BLD AUTO: 1.9 K/UL (ref 1–4.8)
LYMPHOCYTES NFR BLD: 45.1 % (ref 18–48)
MCH RBC QN AUTO: 30 PG (ref 27–31)
MCHC RBC AUTO-ENTMCNC: 33.2 G/DL (ref 32–36)
MCV RBC AUTO: 90 FL (ref 82–98)
MONOCYTES # BLD AUTO: 0.2 K/UL (ref 0.3–1)
MONOCYTES NFR BLD: 4.8 % (ref 4–15)
NEUTROPHILS # BLD AUTO: 1.9 K/UL (ref 1.8–7.7)
NEUTROPHILS NFR BLD: 46.1 % (ref 38–73)
NRBC BLD-RTO: 0 /100 WBC
PLATELET # BLD AUTO: 289 K/UL (ref 150–450)
PMV BLD AUTO: 10 FL (ref 9.2–12.9)
POTASSIUM SERPL-SCNC: 3.7 MMOL/L (ref 3.5–5.1)
PROT SERPL-MCNC: 7.1 G/DL (ref 6–8.4)
RBC # BLD AUTO: 4.33 M/UL (ref 4–5.4)
SATURATED IRON: 15 % (ref 20–50)
SODIUM SERPL-SCNC: 136 MMOL/L (ref 136–145)
TOTAL IRON BINDING CAPACITY: 293 UG/DL (ref 250–450)
TRANSFERRIN SERPL-MCNC: 209 MG/DL (ref 200–375)
WBC # BLD AUTO: 4.19 K/UL (ref 3.9–12.7)

## 2023-05-30 PROCEDURE — 82728 ASSAY OF FERRITIN: CPT | Performed by: INTERNAL MEDICINE

## 2023-05-30 PROCEDURE — 85025 COMPLETE CBC W/AUTO DIFF WBC: CPT | Performed by: INTERNAL MEDICINE

## 2023-05-30 PROCEDURE — 80053 COMPREHEN METABOLIC PANEL: CPT | Performed by: INTERNAL MEDICINE

## 2023-05-30 PROCEDURE — 84466 ASSAY OF TRANSFERRIN: CPT | Performed by: INTERNAL MEDICINE

## 2023-05-30 PROCEDURE — 36415 COLL VENOUS BLD VENIPUNCTURE: CPT | Performed by: INTERNAL MEDICINE

## 2023-06-13 ENCOUNTER — PATIENT MESSAGE (OUTPATIENT)
Dept: RESEARCH | Facility: HOSPITAL | Age: 23
End: 2023-06-13
Payer: MEDICAID

## 2023-06-14 NOTE — PROGRESS NOTES
"[facility] Hematology/Oncology  PROGRESS NOTE - Follow-up Visit      Subjective:       Patient ID:   NAME: Alanna Hammer : 2000     22 y.o. female    Referring Doc: Danielle Angel MD  Other Physicians: Dr. Costa, Dr. Conteh, Dr. Diane,             Chief Complaint: Iron Deficiency Anemia  f/u     History of Present Illness:     Patient returns today for a regularly scheduled follow-up visit.  The patient is here today to go over the results of the recently ordered labs, tests and studies. She is here with her mom today.     She states she has been feeling "fine" and is no longer craving ice; she is no longer feeling "cold"       No CP, SOB, HA's or N/V     She last had IV iron back in 2023        ROS:   GEN: normal without any fever, night sweats or weight loss;  more energy and no longer craving ice  HEENT: normal with no HA's, sore throat, stiff neck, changes in vision  CV: normal with no CP, SOB, PND, DAVIS or orthopnea  PULM: normal with no SOB, cough, hemoptysis, sputum or pleuritic pain  GI: normal with no abdominal pain, nausea, vomiting, constipation, diarrhea, melanotic stools, BRBPR, or hematemesis  : normal with no hematuria, dysuria  BREAST: normal with no mass, discharge, pain  SKIN: normal with no rash, erythema, bruising, or swelling    Pain Scale: 0     Allergies:  Review of patient's allergies indicates:   Allergen Reactions    Egg derived      Egg white    Lactose        Medications:    Current Outpatient Medications:     diphenhydrAMINE (BENYLIN) 12.5 mg/5 mL liquid, Take by mouth 4 (four) times daily as needed for Allergies., Disp: , Rfl:     ibuprofen (ADVIL,MOTRIN) 100 mg/5 mL suspension, Take 15 mLs by mouth every 6 (six) hours as needed for Temperature greater than., Disp: , Rfl:     iron fum-B12-IF-C-folic acid (FOLTRIN) 110-0.5 mg capsule, Take 1 capsule by mouth 2 (two) times daily., Disp: , Rfl:     lactase (LACTAID) 3,000 unit tablet, Take 1 tablet by mouth " "3 (three) times daily with meals., Disp: , Rfl:     Lactobacillus acidophilus (PROBIOTIC ACIDOPHILUS ORAL), Take by mouth., Disp: , Rfl:     metronidazole 0.75% (METROCREAM) 0.75 % Crea, Apply topically 2 (two) times daily., Disp: , Rfl:     polyethylene glycol (GLYCOLAX) 17 gram/dose powder, Take 17 g by mouth as needed., Disp: , Rfl:     polysaccharide iron complex (NOVAFERRUM 50) 50 mg iron Cap, Take 1 capsule by mouth once daily., Disp: 90 capsule, Rfl: 2    PMHx/PSHx Updates:  See patient's last visit with me on 3/8/2023  See H&P on 12/2/2022        Pathology:   Cancer Staging   No matching staging information was found for the patient.          Objective:     Vitals:  Blood pressure (!) 108/54, pulse 73, temperature 98.1 °F (36.7 °C), resp. rate 16, height 5' 5" (1.651 m), weight 63 kg (139 lb).    Physical Examination:   GEN: no apparent distress, comfortable; AAOx3  HEAD: atraumatic and normocephalic  EYES: no pallor, no icterus, PERRLA  ENT: OMM, no pharyngeal erythema, external ears WNL; no nasal discharge; no thrush  NECK: no masses, thyroid normal, trachea midline, no LAD/LN's, supple  CV: RRR with no murmur; normal pulse; normal S1 and S2; no pedal edema  CHEST: Normal respiratory effort; CTAB; normal breath sounds; no wheeze or crackles  ABDOM: nontender and nondistended; soft; normal bowel sounds; no rebound/guarding  MUSC/Skeletal: ROM normal; no crepitus; joints normal; no deformities or arthropathy  EXTREM: no clubbing, cyanosis, inflammation or swelling  SKIN: no rashes, lesions, ulcers, petechiae or subcutaneous nodules  : no koch  NEURO: grossly intact; motor/sensory WNL; AAOx3; no tremors  PSYCH: normal mood, affect and behavior  LYMPH: normal cervical, supraclavicular, axillary and groin LN's            Labs:     Lab Results   Component Value Date    WBC 4.19 05/30/2023    HGB 13.0 05/30/2023    HCT 39.1 05/30/2023    MCV 90 05/30/2023     05/30/2023       CMP  Sodium   Date Value " Ref Range Status   05/30/2023 136 136 - 145 mmol/L Final     Potassium   Date Value Ref Range Status   05/30/2023 3.7 3.5 - 5.1 mmol/L Final     Chloride   Date Value Ref Range Status   05/30/2023 104 95 - 110 mmol/L Final     CO2   Date Value Ref Range Status   05/30/2023 26 23 - 29 mmol/L Final     Glucose   Date Value Ref Range Status   05/30/2023 109 70 - 110 mg/dL Final     BUN   Date Value Ref Range Status   05/30/2023 8 6 - 20 mg/dL Final     Creatinine   Date Value Ref Range Status   05/30/2023 0.5 0.5 - 1.4 mg/dL Final     Calcium   Date Value Ref Range Status   05/30/2023 9.3 8.7 - 10.5 mg/dL Final     Total Protein   Date Value Ref Range Status   05/30/2023 7.1 6.0 - 8.4 g/dL Final     Albumin   Date Value Ref Range Status   05/30/2023 4.1 3.5 - 5.2 g/dL Final     Total Bilirubin   Date Value Ref Range Status   05/30/2023 0.7 0.1 - 1.0 mg/dL Final     Comment:     For infants and newborns, interpretation of results should be based  on gestational age, weight and in agreement with clinical  observations.    Premature Infant recommended reference ranges:  Up to 24 hours.............<8.0 mg/dL  Up to 48 hours............<12.0 mg/dL  3-5 days..................<15.0 mg/dL  6-29 days.................<15.0 mg/dL       Alkaline Phosphatase   Date Value Ref Range Status   05/30/2023 61 55 - 135 U/L Final     AST   Date Value Ref Range Status   05/30/2023 17 10 - 40 U/L Final     ALT   Date Value Ref Range Status   05/30/2023 13 10 - 44 U/L Final     Anion Gap   Date Value Ref Range Status   05/30/2023 6 (L) 8 - 16 mmol/L Final     eGFR   Date Value Ref Range Status   05/30/2023 >60.0 >60 mL/min/1.73 m^2 Final         Lab Results   Component Value Date    IRON 44 05/30/2023    TRANSFERRIN 209 05/30/2023    TIBC 293 05/30/2023    FESATURATED 15 (L) 05/30/2023      Lab Results   Component Value Date    FERRITIN 26 05/30/2023              Hgb F                          0.0              %        MB     Reference Range:  0.0-2.0                                 Hgb A                          97.7             %        MB     Reference Range: 96.4-98.8                               Hgb A2                         2.3              %        MB     Reference Range: 1.8-3.2                                 Hgb S                          0.0              %        MB     Reference Range: 0.0                                         Alpha-Thalassemia              Comment:                  TG     Test: Alpha-Thalassemia, DNA Analysis   Result:   Negative, alpha alpha/alpha alpha     Radiology/Diagnostic Studies:  US BREAST LEFT LIMITED     CLINICAL HISTORY:  Unspecified lump in the left breast, upper outer quadrant     TECHNIQUE:  CMS MANDATED QUALITY DATA - MAMMOGRAM - 225     This [facility] utilizes a reminder system to ensure that all patients receive reminder letters and/or direct phone calls for appointments. This includes reminders for routine screening mammograms, diagnostic mammograms, and other breast imaging interventions when appropriate. This patient will be placed in the appropriate reminder system.     The interpretation was assisted by Computer Aided Detection with [employer].     COMPARISON:  None     FINDINGS:  Grayscale evaluation of the left breast 3-4 o'clock area of interest, 6 cm from the nipple was performed.  Of note, patient reports the palpable abnormality is intermittent, and does not feel it today.  Normal fibroglandular elements are evident in this region.  No suspicious cystic or solid mass.     Impression:     Negative targeted left breast ultrasound.     Clinical follow-up and screening mammography at age 40 recommended.     BI-RADS CATEGORY 1: NEGATIVE.    I have reviewed all available lab results and radiology reports.    Assessment/Plan:   (1) 22 y.o. female with diagnosis of iron deficiency anemia. She has been on oral iron for three months with only minimal improvements.   - orders placed for  IV iron x 4  - recheck labs after 4 weeks and then follow up with Dr. Issa   - Thalessemia and hemoglobinopathy     1/11/2023:  - she is getting 4th IV iron today  - check labs monthly - will have done next week    3/8/2023:  - s/p last IV iron was last week  - latest labs are good with hgb WNL; iron panel now WNL    6/15/2023:  - hgb, iron and ferritin remain wnl  - check labs again in 3 month  - encourage the use of oral iron       (2) Gastritis   - follow up with GI   - takes a probiotic daily      (3) Autism/OCD            VISIT DIAGNOSES:      Iron deficiency anemia, unspecified iron deficiency anemia type          PLAN:  1. Check labs monthly and resume IV iron as needed  2. Encouraged use of oral iron  3.  F/u with GYN, PCP  4. Follow up with GI as needed         RTC in  12 weeks       Fax note to  CONNIE Malagon, and Thea    Discussion:     COVID-19 Discussion:    I had long discussion with patient and any applicable family about the COVID-19 coronavirus epidemic and the recommended precautions with regard to cancer and/or hematology patients. I have re-iterated the CDC recommendations for adequate hand washing, use of hand -like products, and coughing into elbow, etc. In addition, especially for our patients who are on chemotherapy and/or our otherwise immunocompromised patients, I have recommended avoidance of crowds, including movie theaters, restaurants, churches, etc. I have recommended avoidance of any sick or symptomatic family members and/or friends. I have also recommended avoidance of any raw and unwashed food products, and general avoidance of food items that have not been prepared by themselves. The patient has been asked to call us immediately with any symptom developments, issues, questions or other general concerns.       Iron Infusion Therapy Discussion:     I provided literature/learning materials on the particular IV iron regimen and discussed the potential  side-effect profiles of the drug(s). I discussed the importance of compliance with obtaining and monitoring requested lab work, and went over the potential risk for the development of anaphylactic shock, bronchospasm, dysrhythmia, liver and/or kidney damage, and respiratory/cardiovascular arrest and/or failure. I discussed the potential risks for development of alopecia, fevers, itching, chills and/or rigors, cold sensory issues, ringing in ears, vertigo and neuropathy, all of which are usually acute but sometimes could end up being chronic and life-long. I discussed the risks of hand-foot syndrome and rashes, and development of other autoimmune mediated processes such as pneumonitis and colitis which could be life threatening.     The patient's consent has been obtained to proceed with the IV iron therapy.The patient will be referred to [facility] for training and education on IV iron therapy, use of antiemetics and/or anti-diarrheals, use of NSAID's, potential IV iron therapy side-effects, and any specific recommendations and precautions with the particular IV iron agents.      I answered all of the patient's (and family's, if applicable) questions to the best of my ability and to their complete satisfaction. The patient acknowledged full understanding of the risks, recommendations and plan(s).     I spent over 25 mins of time with the patient. Reviewed results of the recently ordered labs, tests and studies; made directives with regards to the results. Over half of this time was spent couseling and coordinating care.    I have explained all of the above in detail and the patient understands all of the current recommendation(s). I have answered all of their questions to the best of my ability and to their complete satisfaction.   The patient is to continue with the current management plan.            Electronically signed by Gustavo Issa MD

## 2023-06-15 ENCOUNTER — OFFICE VISIT (OUTPATIENT)
Dept: HEMATOLOGY/ONCOLOGY | Facility: CLINIC | Age: 23
End: 2023-06-15
Payer: MEDICAID

## 2023-06-15 VITALS
RESPIRATION RATE: 16 BRPM | HEIGHT: 65 IN | WEIGHT: 139 LBS | BODY MASS INDEX: 23.16 KG/M2 | SYSTOLIC BLOOD PRESSURE: 108 MMHG | DIASTOLIC BLOOD PRESSURE: 54 MMHG | TEMPERATURE: 98 F | HEART RATE: 73 BPM

## 2023-06-15 DIAGNOSIS — D50.9 IRON DEFICIENCY ANEMIA, UNSPECIFIED IRON DEFICIENCY ANEMIA TYPE: Primary | ICD-10-CM

## 2023-06-15 PROCEDURE — 3074F SYST BP LT 130 MM HG: CPT | Mod: CPTII,S$GLB,, | Performed by: INTERNAL MEDICINE

## 2023-06-15 PROCEDURE — 3078F DIAST BP <80 MM HG: CPT | Mod: CPTII,S$GLB,, | Performed by: INTERNAL MEDICINE

## 2023-06-15 PROCEDURE — 1160F RVW MEDS BY RX/DR IN RCRD: CPT | Mod: CPTII,S$GLB,, | Performed by: INTERNAL MEDICINE

## 2023-06-15 PROCEDURE — 99213 OFFICE O/P EST LOW 20 MIN: CPT | Mod: S$GLB,,, | Performed by: INTERNAL MEDICINE

## 2023-06-15 PROCEDURE — 99213 PR OFFICE/OUTPT VISIT, EST, LEVL III, 20-29 MIN: ICD-10-PCS | Mod: S$GLB,,, | Performed by: INTERNAL MEDICINE

## 2023-06-15 PROCEDURE — 1160F PR REVIEW ALL MEDS BY PRESCRIBER/CLIN PHARMACIST DOCUMENTED: ICD-10-PCS | Mod: CPTII,S$GLB,, | Performed by: INTERNAL MEDICINE

## 2023-06-15 PROCEDURE — 3008F PR BODY MASS INDEX (BMI) DOCUMENTED: ICD-10-PCS | Mod: CPTII,S$GLB,, | Performed by: INTERNAL MEDICINE

## 2023-06-15 PROCEDURE — 3008F BODY MASS INDEX DOCD: CPT | Mod: CPTII,S$GLB,, | Performed by: INTERNAL MEDICINE

## 2023-06-15 PROCEDURE — 1159F PR MEDICATION LIST DOCUMENTED IN MEDICAL RECORD: ICD-10-PCS | Mod: CPTII,S$GLB,, | Performed by: INTERNAL MEDICINE

## 2023-06-15 PROCEDURE — 3074F PR MOST RECENT SYSTOLIC BLOOD PRESSURE < 130 MM HG: ICD-10-PCS | Mod: CPTII,S$GLB,, | Performed by: INTERNAL MEDICINE

## 2023-06-15 PROCEDURE — 1159F MED LIST DOCD IN RCRD: CPT | Mod: CPTII,S$GLB,, | Performed by: INTERNAL MEDICINE

## 2023-06-15 PROCEDURE — 3078F PR MOST RECENT DIASTOLIC BLOOD PRESSURE < 80 MM HG: ICD-10-PCS | Mod: CPTII,S$GLB,, | Performed by: INTERNAL MEDICINE

## 2023-06-20 ENCOUNTER — PATIENT MESSAGE (OUTPATIENT)
Dept: RESEARCH | Facility: HOSPITAL | Age: 23
End: 2023-06-20
Payer: MEDICAID

## 2023-06-27 ENCOUNTER — PATIENT MESSAGE (OUTPATIENT)
Dept: RESEARCH | Facility: HOSPITAL | Age: 23
End: 2023-06-27
Payer: MEDICAID

## 2023-07-05 ENCOUNTER — PATIENT MESSAGE (OUTPATIENT)
Dept: RESEARCH | Facility: HOSPITAL | Age: 23
End: 2023-07-05
Payer: MEDICAID

## 2023-07-10 RX ORDER — AMMONIUM LACTATE 12 G/100G
CREAM TOPICAL 2 TIMES DAILY
COMMUNITY
Start: 2023-02-27

## 2023-07-11 ENCOUNTER — OFFICE VISIT (OUTPATIENT)
Dept: FAMILY MEDICINE | Facility: CLINIC | Age: 23
End: 2023-07-11
Payer: MEDICAID

## 2023-07-11 ENCOUNTER — PATIENT MESSAGE (OUTPATIENT)
Dept: RESEARCH | Facility: HOSPITAL | Age: 23
End: 2023-07-11
Payer: MEDICAID

## 2023-07-11 VITALS
SYSTOLIC BLOOD PRESSURE: 108 MMHG | HEIGHT: 65 IN | TEMPERATURE: 98 F | RESPIRATION RATE: 20 BRPM | HEART RATE: 76 BPM | BODY MASS INDEX: 22.97 KG/M2 | WEIGHT: 137.88 LBS | DIASTOLIC BLOOD PRESSURE: 60 MMHG

## 2023-07-11 DIAGNOSIS — Z23 NEED FOR TDAP VACCINATION: ICD-10-CM

## 2023-07-11 DIAGNOSIS — D50.9 IRON DEFICIENCY ANEMIA, UNSPECIFIED IRON DEFICIENCY ANEMIA TYPE: ICD-10-CM

## 2023-07-11 DIAGNOSIS — Z00.00 ANNUAL PHYSICAL EXAM: Primary | ICD-10-CM

## 2023-07-11 DIAGNOSIS — F84.0 AUTISM: ICD-10-CM

## 2023-07-11 PROCEDURE — 3078F DIAST BP <80 MM HG: CPT | Mod: CPTII,,, | Performed by: NURSE PRACTITIONER

## 2023-07-11 PROCEDURE — 99395 PR PREVENTIVE VISIT,EST,18-39: ICD-10-PCS | Mod: S$PBB,,, | Performed by: NURSE PRACTITIONER

## 2023-07-11 PROCEDURE — 3078F PR MOST RECENT DIASTOLIC BLOOD PRESSURE < 80 MM HG: ICD-10-PCS | Mod: CPTII,,, | Performed by: NURSE PRACTITIONER

## 2023-07-11 PROCEDURE — 1160F RVW MEDS BY RX/DR IN RCRD: CPT | Mod: CPTII,,, | Performed by: NURSE PRACTITIONER

## 2023-07-11 PROCEDURE — 90471 IMMUNIZATION ADMIN: CPT | Mod: PBBFAC | Performed by: NURSE PRACTITIONER

## 2023-07-11 PROCEDURE — 3074F PR MOST RECENT SYSTOLIC BLOOD PRESSURE < 130 MM HG: ICD-10-PCS | Mod: CPTII,,, | Performed by: NURSE PRACTITIONER

## 2023-07-11 PROCEDURE — 1159F PR MEDICATION LIST DOCUMENTED IN MEDICAL RECORD: ICD-10-PCS | Mod: CPTII,,, | Performed by: NURSE PRACTITIONER

## 2023-07-11 PROCEDURE — 90715 TDAP VACCINE 7 YRS/> IM: CPT | Mod: PBBFAC | Performed by: NURSE PRACTITIONER

## 2023-07-11 PROCEDURE — 1160F PR REVIEW ALL MEDS BY PRESCRIBER/CLIN PHARMACIST DOCUMENTED: ICD-10-PCS | Mod: CPTII,,, | Performed by: NURSE PRACTITIONER

## 2023-07-11 PROCEDURE — 99395 PREV VISIT EST AGE 18-39: CPT | Mod: S$PBB,,, | Performed by: NURSE PRACTITIONER

## 2023-07-11 PROCEDURE — 99214 OFFICE O/P EST MOD 30 MIN: CPT | Performed by: NURSE PRACTITIONER

## 2023-07-11 PROCEDURE — 3074F SYST BP LT 130 MM HG: CPT | Mod: CPTII,,, | Performed by: NURSE PRACTITIONER

## 2023-07-11 PROCEDURE — 1159F MED LIST DOCD IN RCRD: CPT | Mod: CPTII,,, | Performed by: NURSE PRACTITIONER

## 2023-07-11 PROCEDURE — 3008F PR BODY MASS INDEX (BMI) DOCUMENTED: ICD-10-PCS | Mod: CPTII,,, | Performed by: NURSE PRACTITIONER

## 2023-07-11 PROCEDURE — 3008F BODY MASS INDEX DOCD: CPT | Mod: CPTII,,, | Performed by: NURSE PRACTITIONER

## 2023-07-11 NOTE — PROGRESS NOTES
HPI: Alanna Hammer  is a 22 y.o. female who presents for annual physical .  No complaints at this time.     Review of Systems   Constitutional:  Negative for activity change, appetite change and fever.   HENT:  Negative for congestion, ear discharge, ear pain, sore throat, trouble swallowing and voice change.    Eyes:  Negative for photophobia, pain, discharge and visual disturbance.   Respiratory:  Negative for cough, chest tightness and shortness of breath.    Cardiovascular:  Negative for chest pain and palpitations.   Gastrointestinal:  Negative for abdominal pain, nausea and vomiting.   Endocrine: Negative for cold intolerance and heat intolerance.   Genitourinary:  Negative for difficulty urinating and dysuria.   Musculoskeletal:  Negative for arthralgias and gait problem.   Skin:  Negative for rash.   Allergic/Immunologic: Negative for immunocompromised state.   Neurological:  Negative for speech difficulty and headaches.   Psychiatric/Behavioral:  Negative for confusion, self-injury and suicidal ideas.         Autism   Review of patient's allergies indicates:   Allergen Reactions    Egg derived      Egg white    Lactose      Past Medical History:   Diagnosis Date    Acne 11/20/2020    Autism 8/21/2020    Chronic constipation 8/22/2020    Gastroesophageal reflux disease without esophagitis 8/21/2020    History of speech therapy      History reviewed. No pertinent surgical history.  Family History   Problem Relation Age of Onset    No Known Problems Mother     No Known Problems Father      Social History     Tobacco Use    Smoking status: Never    Smokeless tobacco: Never   Substance Use Topics    Alcohol use: Never    Drug use: Never      Health Maintenance Topics with due status: Not Due       Topic Last Completion Date    Influenza Vaccine 11/04/2022    TETANUS VACCINE 07/11/2023     Immunization History   Administered Date(s) Administered    COVID-19, MRNA, LN-S, PF (Pfizer) (Purple Cap)  05/07/2021, 05/28/2021, 12/29/2021    COVID-19, mRNA, LNP-S, bivalent booster, PF (PFIZER OMICRON) 02/24/2023    DTP 2000, 01/29/2001    DTaP (5 Pertussis Antigens) 03/27/2001, 01/03/2002, 10/19/2004    HIB 2000, 01/29/2001, 03/27/2001, 01/03/2002    Hepatitis A, Pediatric/Adolescent, 2 Dose 01/15/2018, 09/28/2018    Hepatitis B 03/27/2001    Hepatitis B, Pediatric/Adolescent 2000, 2000    IPV 09/28/2001, 10/19/2004    Influenza - Quadrivalent - PF *Preferred* (6 months and older) 01/15/2018, 11/21/2018, 11/07/2019, 10/15/2020, 10/23/2021, 11/04/2022    Influenza Split 11/20/2006, 11/19/2007, 12/29/2008, 12/21/2009    MMR 09/28/2001, 10/19/2004    Meningococcal Conjugate (MCV4O) 09/27/2011    Meningococcal Conjugate (MCV4P) 01/15/2018    OPV 2000, 01/29/2001    Pneumococcal Conjugate - 7 Valent 06/22/2001, 01/03/2002, 10/19/2004    Tdap 09/27/2011, 07/11/2023    Varicella 09/28/2001, 12/29/2008     OBJECTIVE:      Vitals:    07/11/23 0812   BP: 108/60   Pulse: 76   Resp: 20   Temp: 98 °F (36.7 °C)     Physical Exam  Vitals and nursing note reviewed.   Constitutional:       General: She is not in acute distress.     Appearance: Normal appearance. She is well-developed.   HENT:      Head: Normocephalic and atraumatic.      Right Ear: Tympanic membrane, ear canal and external ear normal.      Left Ear: Tympanic membrane, ear canal and external ear normal.      Nose: Nose normal.      Mouth/Throat:      Mouth: Mucous membranes are moist.   Eyes:      General: Lids are normal. Lids are everted, no foreign bodies appreciated.      Conjunctiva/sclera: Conjunctivae normal.      Pupils: Pupils are equal, round, and reactive to light.      Right eye: Pupil is round and reactive.      Left eye: Pupil is round and reactive.   Neck:      Trachea: Trachea normal.   Cardiovascular:      Rate and Rhythm: Normal rate and regular rhythm.      Pulses: Normal pulses.      Heart sounds: Normal heart sounds,  S1 normal and S2 normal.   Pulmonary:      Effort: Pulmonary effort is normal.      Breath sounds: Normal breath sounds.   Abdominal:      General: Abdomen is flat. Bowel sounds are normal.      Palpations: Abdomen is soft. Abdomen is not rigid.      Tenderness: There is no guarding.   Musculoskeletal:         General: Normal range of motion.      Cervical back: Normal range of motion and neck supple. No muscular tenderness.   Lymphadenopathy:      Cervical: No cervical adenopathy.   Skin:     General: Skin is warm and dry.      Capillary Refill: Capillary refill takes less than 2 seconds.   Neurological:      General: No focal deficit present.      Mental Status: She is alert and oriented to person, place, and time. Mental status is at baseline.   Psychiatric:         Attention and Perception: Attention normal.         Mood and Affect: Mood is anxious. Mood is not depressed.         Speech: Speech normal.         Behavior: Behavior normal. Behavior is cooperative.         Thought Content: Thought content normal.         Judgment: Judgment normal.      Assessment:       1. Annual physical exam    2. Need for Tdap vaccination    3. Autism    4. Iron deficiency anemia, unspecified iron deficiency anemia type        Plan:       Annual physical exam  Completed    Need for Tdap vaccination  -     Tdap Vaccine    Autism  Stable  Paperwork completed and scanned into the chart    Iron deficiency anemia, unspecified iron deficiency anemia type  Continue iron supplementation and follow up with hematology.      Patient counseled on age appropriate medical preventative services, age appropriate cancer screenings, nutrition, healthy diet, consistent exercise regimen and maintaining an active lifestyle.      Counseled on age appropriate vaccines and discussed upcoming health care needs based on age/gender.  Spent time with patient counseling on need for a good patient/doctor relationship moving forward.  Discussed use of common  OTC medications and supplements.  Discussed common dietary aids and use of caffeine and the need for good sleep hygiene and stress management.    Follow up in about 1 year (around 7/11/2024) for Annual Wellness.      7/11/2023 ANETA Malagon, FNP-C

## 2023-08-31 ENCOUNTER — TELEPHONE (OUTPATIENT)
Dept: HEMATOLOGY/ONCOLOGY | Facility: CLINIC | Age: 23
End: 2023-08-31

## 2023-09-01 ENCOUNTER — LAB VISIT (OUTPATIENT)
Dept: LAB | Facility: HOSPITAL | Age: 23
End: 2023-09-01
Attending: INTERNAL MEDICINE
Payer: MEDICAID

## 2023-09-01 DIAGNOSIS — D50.9 IRON DEFICIENCY ANEMIA, UNSPECIFIED IRON DEFICIENCY ANEMIA TYPE: ICD-10-CM

## 2023-09-01 LAB
ALBUMIN SERPL BCP-MCNC: 4.1 G/DL (ref 3.5–5.2)
ALP SERPL-CCNC: 57 U/L (ref 55–135)
ALT SERPL W/O P-5'-P-CCNC: 12 U/L (ref 10–44)
ANION GAP SERPL CALC-SCNC: 5 MMOL/L (ref 8–16)
AST SERPL-CCNC: 14 U/L (ref 10–40)
BASOPHILS # BLD AUTO: 0.04 K/UL (ref 0–0.2)
BASOPHILS NFR BLD: 0.8 % (ref 0–1.9)
BILIRUB SERPL-MCNC: 0.6 MG/DL (ref 0.1–1)
BUN SERPL-MCNC: 8 MG/DL (ref 6–20)
CALCIUM SERPL-MCNC: 9.4 MG/DL (ref 8.7–10.5)
CHLORIDE SERPL-SCNC: 104 MMOL/L (ref 95–110)
CO2 SERPL-SCNC: 27 MMOL/L (ref 23–29)
CREAT SERPL-MCNC: 0.6 MG/DL (ref 0.5–1.4)
DIFFERENTIAL METHOD: NORMAL
EOSINOPHIL # BLD AUTO: 0.3 K/UL (ref 0–0.5)
EOSINOPHIL NFR BLD: 6.9 % (ref 0–8)
ERYTHROCYTE [DISTWIDTH] IN BLOOD BY AUTOMATED COUNT: 13 % (ref 11.5–14.5)
EST. GFR  (NO RACE VARIABLE): >60 ML/MIN/1.73 M^2
FERRITIN SERPL-MCNC: 24 NG/ML (ref 20–300)
GLUCOSE SERPL-MCNC: 93 MG/DL (ref 70–110)
HCT VFR BLD AUTO: 39.9 % (ref 37–48.5)
HGB BLD-MCNC: 13.4 G/DL (ref 12–16)
IMM GRANULOCYTES # BLD AUTO: 0.01 K/UL (ref 0–0.04)
IMM GRANULOCYTES NFR BLD AUTO: 0.2 % (ref 0–0.5)
IRON SERPL-MCNC: 52 UG/DL (ref 30–160)
LYMPHOCYTES # BLD AUTO: 1.4 K/UL (ref 1–4.8)
LYMPHOCYTES NFR BLD: 28.4 % (ref 18–48)
MCH RBC QN AUTO: 29.7 PG (ref 27–31)
MCHC RBC AUTO-ENTMCNC: 33.6 G/DL (ref 32–36)
MCV RBC AUTO: 89 FL (ref 82–98)
MONOCYTES # BLD AUTO: 0.3 K/UL (ref 0.3–1)
MONOCYTES NFR BLD: 6.5 % (ref 4–15)
NEUTROPHILS # BLD AUTO: 2.7 K/UL (ref 1.8–7.7)
NEUTROPHILS NFR BLD: 57.2 % (ref 38–73)
NRBC BLD-RTO: 0 /100 WBC
PLATELET # BLD AUTO: 273 K/UL (ref 150–450)
PMV BLD AUTO: 10.4 FL (ref 9.2–12.9)
POTASSIUM SERPL-SCNC: 4.1 MMOL/L (ref 3.5–5.1)
PROT SERPL-MCNC: 7.2 G/DL (ref 6–8.4)
RBC # BLD AUTO: 4.51 M/UL (ref 4–5.4)
SATURATED IRON: 17 % (ref 20–50)
SODIUM SERPL-SCNC: 136 MMOL/L (ref 136–145)
TOTAL IRON BINDING CAPACITY: 301 UG/DL (ref 250–450)
TRANSFERRIN SERPL-MCNC: 215 MG/DL (ref 200–375)
WBC # BLD AUTO: 4.79 K/UL (ref 3.9–12.7)

## 2023-09-01 PROCEDURE — 84466 ASSAY OF TRANSFERRIN: CPT | Performed by: INTERNAL MEDICINE

## 2023-09-01 PROCEDURE — 36415 COLL VENOUS BLD VENIPUNCTURE: CPT | Performed by: INTERNAL MEDICINE

## 2023-09-01 PROCEDURE — 82728 ASSAY OF FERRITIN: CPT | Performed by: INTERNAL MEDICINE

## 2023-09-01 PROCEDURE — 80053 COMPREHEN METABOLIC PANEL: CPT | Performed by: INTERNAL MEDICINE

## 2023-09-01 PROCEDURE — 83540 ASSAY OF IRON: CPT | Performed by: INTERNAL MEDICINE

## 2023-09-01 PROCEDURE — 85025 COMPLETE CBC W/AUTO DIFF WBC: CPT | Performed by: INTERNAL MEDICINE

## 2023-09-07 ENCOUNTER — OFFICE VISIT (OUTPATIENT)
Dept: HEMATOLOGY/ONCOLOGY | Facility: CLINIC | Age: 23
End: 2023-09-07
Payer: MEDICAID

## 2023-09-07 VITALS
RESPIRATION RATE: 16 BRPM | HEIGHT: 65 IN | TEMPERATURE: 98 F | DIASTOLIC BLOOD PRESSURE: 53 MMHG | WEIGHT: 134.19 LBS | HEART RATE: 81 BPM | SYSTOLIC BLOOD PRESSURE: 112 MMHG | BODY MASS INDEX: 22.36 KG/M2

## 2023-09-07 DIAGNOSIS — D50.9 IRON DEFICIENCY ANEMIA, UNSPECIFIED IRON DEFICIENCY ANEMIA TYPE: Primary | ICD-10-CM

## 2023-09-07 PROCEDURE — 99213 PR OFFICE/OUTPT VISIT, EST, LEVL III, 20-29 MIN: ICD-10-PCS | Mod: S$GLB,,, | Performed by: INTERNAL MEDICINE

## 2023-09-07 PROCEDURE — 1160F RVW MEDS BY RX/DR IN RCRD: CPT | Mod: CPTII,S$GLB,, | Performed by: INTERNAL MEDICINE

## 2023-09-07 PROCEDURE — 3074F PR MOST RECENT SYSTOLIC BLOOD PRESSURE < 130 MM HG: ICD-10-PCS | Mod: CPTII,S$GLB,, | Performed by: INTERNAL MEDICINE

## 2023-09-07 PROCEDURE — 1160F PR REVIEW ALL MEDS BY PRESCRIBER/CLIN PHARMACIST DOCUMENTED: ICD-10-PCS | Mod: CPTII,S$GLB,, | Performed by: INTERNAL MEDICINE

## 2023-09-07 PROCEDURE — 3078F DIAST BP <80 MM HG: CPT | Mod: CPTII,S$GLB,, | Performed by: INTERNAL MEDICINE

## 2023-09-07 PROCEDURE — 1159F MED LIST DOCD IN RCRD: CPT | Mod: CPTII,S$GLB,, | Performed by: INTERNAL MEDICINE

## 2023-09-07 PROCEDURE — 3078F PR MOST RECENT DIASTOLIC BLOOD PRESSURE < 80 MM HG: ICD-10-PCS | Mod: CPTII,S$GLB,, | Performed by: INTERNAL MEDICINE

## 2023-09-07 PROCEDURE — 3074F SYST BP LT 130 MM HG: CPT | Mod: CPTII,S$GLB,, | Performed by: INTERNAL MEDICINE

## 2023-09-07 PROCEDURE — 3008F PR BODY MASS INDEX (BMI) DOCUMENTED: ICD-10-PCS | Mod: CPTII,S$GLB,, | Performed by: INTERNAL MEDICINE

## 2023-09-07 PROCEDURE — 99213 OFFICE O/P EST LOW 20 MIN: CPT | Mod: S$GLB,,, | Performed by: INTERNAL MEDICINE

## 2023-09-07 PROCEDURE — 1159F PR MEDICATION LIST DOCUMENTED IN MEDICAL RECORD: ICD-10-PCS | Mod: CPTII,S$GLB,, | Performed by: INTERNAL MEDICINE

## 2023-09-07 PROCEDURE — 3008F BODY MASS INDEX DOCD: CPT | Mod: CPTII,S$GLB,, | Performed by: INTERNAL MEDICINE

## 2023-09-07 NOTE — PROGRESS NOTES
"[facility] Hematology/Oncology  PROGRESS NOTE - Follow-up Visit      Subjective:       Patient ID:   NAME: Alanna Hammer : 2000     22 y.o. female    Referring Doc: Danielle Angel MD  Other Physicians: Dr. Costa, Dr. Conteh, Dr. Diane,             Chief Complaint: Iron Deficiency Anemia  f/u     History of Present Illness:     Patient returns today for a regularly scheduled follow-up visit.  The patient is here today to go over the results of the recently ordered labs, tests and studies. She is here by herself today.     She states she has been feeling "fine" ; she recently resumed some IV iron    She saw Cristy Valentin NP in 2023     No CP, SOB, HA's or N/V              ROS:   GEN: normal without any fever, night sweats or weight loss;  more energy and no longer craving ice  HEENT: normal with no HA's, sore throat, stiff neck, changes in vision  CV: normal with no CP, SOB, PND, DAVIS or orthopnea  PULM: normal with no SOB, cough, hemoptysis, sputum or pleuritic pain  GI: normal with no abdominal pain, nausea, vomiting, constipation, diarrhea, melanotic stools, BRBPR, or hematemesis  : normal with no hematuria, dysuria  BREAST: normal with no mass, discharge, pain  SKIN: normal with no rash, erythema, bruising, or swelling    Pain Scale: 0     Allergies:  Review of patient's allergies indicates:   Allergen Reactions    Egg derived      Egg white    Lactose        Medications:    Current Outpatient Medications:     ammonium lactate 12 % Crea, Apply topically 2 (two) times daily., Disp: , Rfl:     diphenhydrAMINE (BENYLIN) 12.5 mg/5 mL liquid, Take by mouth 4 (four) times daily as needed for Allergies., Disp: , Rfl:     ibuprofen (ADVIL,MOTRIN) 100 mg/5 mL suspension, Take 15 mLs by mouth every 6 (six) hours as needed for Temperature greater than., Disp: , Rfl:     iron fum-B12-IF-C-folic acid (FOLTRIN) 110-0.5 mg capsule, Take 1 capsule by mouth 2 (two) times daily., Disp: , Rfl:     " "lactase (LACTAID) 3,000 unit tablet, Take 1 tablet by mouth 3 (three) times daily with meals., Disp: , Rfl:     Lactobacillus acidophilus (PROBIOTIC ACIDOPHILUS ORAL), Take by mouth., Disp: , Rfl:     metronidazole 0.75% (METROCREAM) 0.75 % Crea, Apply topically 2 (two) times daily., Disp: , Rfl:     polyethylene glycol (GLYCOLAX) 17 gram/dose powder, Take 17 g by mouth as needed., Disp: , Rfl:     polysaccharide iron complex (NOVAFERRUM 50) 50 mg iron Cap, Take 1 capsule by mouth once daily., Disp: 90 capsule, Rfl: 2    PMHx/PSHx Updates:  See patient's last visit with me on 6/15/2023  See H&P on 12/2/2022        Pathology:   Cancer Staging   No matching staging information was found for the patient.          Objective:     Vitals:  Blood pressure (!) 112/53, pulse 81, temperature 98 °F (36.7 °C), resp. rate 16, height 5' 5" (1.651 m), weight 60.9 kg (134 lb 3.2 oz).    Physical Examination:   GEN: no apparent distress, comfortable; AAOx3  HEAD: atraumatic and normocephalic  EYES: no pallor, no icterus, PERRLA  ENT: OMM, no pharyngeal erythema, external ears WNL; no nasal discharge; no thrush  NECK: no masses, thyroid normal, trachea midline, no LAD/LN's, supple  CV: RRR with no murmur; normal pulse; normal S1 and S2; no pedal edema  CHEST: Normal respiratory effort; CTAB; normal breath sounds; no wheeze or crackles  ABDOM: nontender and nondistended; soft; normal bowel sounds; no rebound/guarding  MUSC/Skeletal: ROM normal; no crepitus; joints normal; no deformities or arthropathy  EXTREM: no clubbing, cyanosis, inflammation or swelling  SKIN: no rashes, lesions, ulcers, petechiae or subcutaneous nodules  : no koch  NEURO: grossly intact; motor/sensory WNL; AAOx3; no tremors  PSYCH: normal mood, affect and behavior  LYMPH: normal cervical, supraclavicular, axillary and groin LN's            Labs:     Lab Results   Component Value Date    WBC 4.79 09/01/2023    HGB 13.4 09/01/2023    HCT 39.9 09/01/2023    MCV " 89 09/01/2023     09/01/2023       CMP  Sodium   Date Value Ref Range Status   09/01/2023 136 136 - 145 mmol/L Final     Potassium   Date Value Ref Range Status   09/01/2023 4.1 3.5 - 5.1 mmol/L Final     Chloride   Date Value Ref Range Status   09/01/2023 104 95 - 110 mmol/L Final     CO2   Date Value Ref Range Status   09/01/2023 27 23 - 29 mmol/L Final     Glucose   Date Value Ref Range Status   09/01/2023 93 70 - 110 mg/dL Final     BUN   Date Value Ref Range Status   09/01/2023 8 6 - 20 mg/dL Final     Creatinine   Date Value Ref Range Status   09/01/2023 0.6 0.5 - 1.4 mg/dL Final     Calcium   Date Value Ref Range Status   09/01/2023 9.4 8.7 - 10.5 mg/dL Final     Total Protein   Date Value Ref Range Status   09/01/2023 7.2 6.0 - 8.4 g/dL Final     Albumin   Date Value Ref Range Status   09/01/2023 4.1 3.5 - 5.2 g/dL Final     Total Bilirubin   Date Value Ref Range Status   09/01/2023 0.6 0.1 - 1.0 mg/dL Final     Comment:     For infants and newborns, interpretation of results should be based  on gestational age, weight and in agreement with clinical  observations.    Premature Infant recommended reference ranges:  Up to 24 hours.............<8.0 mg/dL  Up to 48 hours............<12.0 mg/dL  3-5 days..................<15.0 mg/dL  6-29 days.................<15.0 mg/dL       Alkaline Phosphatase   Date Value Ref Range Status   09/01/2023 57 55 - 135 U/L Final     AST   Date Value Ref Range Status   09/01/2023 14 10 - 40 U/L Final     ALT   Date Value Ref Range Status   09/01/2023 12 10 - 44 U/L Final     Anion Gap   Date Value Ref Range Status   09/01/2023 5 (L) 8 - 16 mmol/L Final     eGFR   Date Value Ref Range Status   09/01/2023 >60.0 >60 mL/min/1.73 m^2 Final         Lab Results   Component Value Date    IRON 52 09/01/2023    TRANSFERRIN 215 09/01/2023    TIBC 301 09/01/2023    FESATURATED 17 (L) 09/01/2023      Lab Results   Component Value Date    FERRITIN 24 09/01/2023              Hgb F                           0.0              %        MB     Reference Range: 0.0-2.0                                 Hgb A                          97.7             %        MB     Reference Range: 96.4-98.8                               Hgb A2                         2.3              %        MB     Reference Range: 1.8-3.2                                 Hgb S                          0.0              %        MB     Reference Range: 0.0                                         Alpha-Thalassemia              Comment:                  TG     Test: Alpha-Thalassemia, DNA Analysis   Result:   Negative, alpha alpha/alpha alpha     Radiology/Diagnostic Studies:  US BREAST LEFT LIMITED     CLINICAL HISTORY:  Unspecified lump in the left breast, upper outer quadrant     TECHNIQUE:  CMS MANDATED QUALITY DATA - MAMMOGRAM - 225     [facility] utilizes a reminder system to ensure that all patients receive reminder letters and/or direct phone calls for appointments. This includes reminders for routine screening mammograms, diagnostic mammograms, and other breast imaging interventions when appropriate. This patient will be placed in the appropriate reminder system.     The interpretation was assisted by Computer Aided Detection with [employer].     COMPARISON:  None     FINDINGS:  Grayscale evaluation of the left breast 3-4 o'clock area of interest, 6 cm from the nipple was performed.  Of note, patient reports the palpable abnormality is intermittent, and does not feel it today.  Normal fibroglandular elements are evident in this region.  No suspicious cystic or solid mass.     Impression:     Negative targeted left breast ultrasound.     Clinical follow-up and screening mammography at age 40 recommended.     BI-RADS CATEGORY 1: NEGATIVE.    I have reviewed all available lab results and radiology reports.    Assessment/Plan:   (1) 22 y.o. female with diagnosis of iron deficiency anemia. She has been on oral iron for  three months with only minimal improvements.   - orders placed for IV iron x 4  - recheck labs after 4 weeks and then follow up with Dr. Issa   - Thalessemia and hemoglobinopathy     1/11/2023:  - she is getting 4th IV iron today  - check labs monthly - will have done next week    3/8/2023:  - s/p last IV iron was last week  - latest labs are good with hgb WNL; iron panel now WNL    6/15/2023:  - hgb, iron and ferritin remain wnl  - check labs again in 3 month  - encourage the use of oral iron    9/7/2023:  - latest hgb was 13.4  - encouraged her to resume oral iron  - latest iron 52 and ferritin 24       (2) Gastritis   - follow up with GI   - takes a probiotic daily      (3) Autism/OCD            VISIT DIAGNOSES:      Iron deficiency anemia, unspecified iron deficiency anemia type          PLAN:  1. Check labs monthly and resume IV iron as needed  2. Encouraged use of oral iron  3.  F/u with GYN, PCP  4. Follow up with GI as needed         RTC in  3-4 months      Fax note to  Cristy Valentin FNP-C, and Thea    Discussion:     COVID-19 Discussion:    I had long discussion with patient and any applicable family about the COVID-19 coronavirus epidemic and the recommended precautions with regard to cancer and/or hematology patients. I have re-iterated the CDC recommendations for adequate hand washing, use of hand -like products, and coughing into elbow, etc. In addition, especially for our patients who are on chemotherapy and/or our otherwise immunocompromised patients, I have recommended avoidance of crowds, including movie theaters, restaurants, churches, etc. I have recommended avoidance of any sick or symptomatic family members and/or friends. I have also recommended avoidance of any raw and unwashed food products, and general avoidance of food items that have not been prepared by themselves. The patient has been asked to call us immediately with any symptom developments, issues, questions or other  general concerns.       Iron Infusion Therapy Discussion:     I provided literature/learning materials on the particular IV iron regimen and discussed the potential side-effect profiles of the drug(s). I discussed the importance of compliance with obtaining and monitoring requested lab work, and went over the potential risk for the development of anaphylactic shock, bronchospasm, dysrhythmia, liver and/or kidney damage, and respiratory/cardiovascular arrest and/or failure. I discussed the potential risks for development of alopecia, fevers, itching, chills and/or rigors, cold sensory issues, ringing in ears, vertigo and neuropathy, all of which are usually acute but sometimes could end up being chronic and life-long. I discussed the risks of hand-foot syndrome and rashes, and development of other autoimmune mediated processes such as pneumonitis and colitis which could be life threatening.     The patient's consent has been obtained to proceed with the IV iron therapy.The patient will be referred to [facility] for training and education on IV iron therapy, use of antiemetics and/or anti-diarrheals, use of NSAID's, potential IV iron therapy side-effects, and any specific recommendations and precautions with the particular IV iron agents.      I answered all of the patient's (and family's, if applicable) questions to the best of my ability and to their complete satisfaction. The patient acknowledged full understanding of the risks, recommendations and plan(s).     I spent over 25 mins of time with the patient. Reviewed results of the recently ordered labs, tests and studies; made directives with regards to the results. Over half of this time was spent couseling and coordinating care.    I have explained all of the above in detail and the patient understands all of the current recommendation(s). I have answered all of their questions to the best of my ability and to their complete  satisfaction.   The patient is to continue with the current management plan.            Electronically signed by Gustavo Issa MD

## 2023-09-15 ENCOUNTER — OFFICE VISIT (OUTPATIENT)
Dept: OPTOMETRY | Facility: CLINIC | Age: 23
End: 2023-09-15
Payer: MEDICAID

## 2023-09-15 DIAGNOSIS — H52.13 MYOPIA OF BOTH EYES: ICD-10-CM

## 2023-09-15 DIAGNOSIS — Z01.00 EXAMINATION OF EYES AND VISION: Primary | ICD-10-CM

## 2023-09-15 PROCEDURE — 92015 PR REFRACTION: ICD-10-PCS | Mod: ,,,

## 2023-09-15 PROCEDURE — 92004 COMPRE OPH EXAM NEW PT 1/>: CPT | Mod: S$PBB,,,

## 2023-09-15 PROCEDURE — 92004 PR EYE EXAM, NEW PATIENT,COMPREHESV: ICD-10-PCS | Mod: S$PBB,,,

## 2023-09-15 PROCEDURE — 1159F PR MEDICATION LIST DOCUMENTED IN MEDICAL RECORD: ICD-10-PCS | Mod: CPTII,,,

## 2023-09-15 PROCEDURE — 99999 PR PBB SHADOW E&M-EST. PATIENT-LVL III: CPT | Mod: PBBFAC,,,

## 2023-09-15 PROCEDURE — 99213 OFFICE O/P EST LOW 20 MIN: CPT | Mod: PBBFAC,PO

## 2023-09-15 PROCEDURE — 99999 PR PBB SHADOW E&M-EST. PATIENT-LVL III: ICD-10-PCS | Mod: PBBFAC,,,

## 2023-09-15 PROCEDURE — 1159F MED LIST DOCD IN RCRD: CPT | Mod: CPTII,,,

## 2023-09-15 PROCEDURE — 92015 DETERMINE REFRACTIVE STATE: CPT | Mod: ,,,

## 2023-09-15 NOTE — Clinical Note
Hi!  I saw this pt today in [place]. Pt is in pursuing LASIK consult. Please call to schedule at your convenience.  Thank you! Dr. Bacon

## 2023-09-15 NOTE — PROGRESS NOTES
HPI    Pt here for annual exam with no complaints. Last eye exam: 07/2022    VA stable with current glasses. Denies flashes or floaters. DX of   strabismus 2003. No gtts   Last edited by Delia Bacon, OD on 9/15/2023  2:43 PM.            Assessment /Plan     For exam results, see Encounter Report.    Examination of eyes and vision    Myopia of both eyes  -     Ambulatory referral/consult to Ophthalmology; Future; Expected date: 09/22/2023      All ocular health WNL OD, OS with no pathology noted to extent of view. Dilation deferred. Undilated 90 and Optos done. Continue to monitor for changes with yearly comprehensive exam.  Discussed spectacle options with pt and released final spec rx. Ed pt on change in rx and adaptation. Mom reports pt's rx has not changed in the last few years, interested in LASIK consult. Referral placed.      *Dilation deferred. Optos done.*      RTC: 1 year for comprehensive exam or sooner prn

## 2023-10-13 ENCOUNTER — OFFICE VISIT (OUTPATIENT)
Dept: OPHTHALMOLOGY | Facility: CLINIC | Age: 23
End: 2023-10-13
Payer: MEDICAID

## 2023-10-13 DIAGNOSIS — H52.13 MYOPIA OF BOTH EYES: Primary | ICD-10-CM

## 2023-10-13 PROCEDURE — 99999 PR PBB SHADOW E&M-EST. PATIENT-LVL II: CPT | Mod: PBBFAC,,, | Performed by: OPHTHALMOLOGY

## 2023-10-13 PROCEDURE — 99241 PR OFFICE CONSULT, COSMETIC: ICD-10-PCS | Mod: CSM,,, | Performed by: OPHTHALMOLOGY

## 2023-10-13 PROCEDURE — 99212 OFFICE O/P EST SF 10 MIN: CPT | Mod: PBBFAC | Performed by: OPHTHALMOLOGY

## 2023-10-13 PROCEDURE — 1160F PR REVIEW ALL MEDS BY PRESCRIBER/CLIN PHARMACIST DOCUMENTED: ICD-10-PCS | Mod: CPTII,,, | Performed by: OPHTHALMOLOGY

## 2023-10-13 PROCEDURE — 1160F RVW MEDS BY RX/DR IN RCRD: CPT | Mod: CPTII,,, | Performed by: OPHTHALMOLOGY

## 2023-10-13 PROCEDURE — 99241 PR OFFICE CONSULT, COSMETIC: CPT | Mod: CSM,,, | Performed by: OPHTHALMOLOGY

## 2023-10-13 PROCEDURE — 1159F PR MEDICATION LIST DOCUMENTED IN MEDICAL RECORD: ICD-10-PCS | Mod: CPTII,,, | Performed by: OPHTHALMOLOGY

## 2023-10-13 PROCEDURE — 1159F MED LIST DOCD IN RCRD: CPT | Mod: CPTII,,, | Performed by: OPHTHALMOLOGY

## 2023-10-13 PROCEDURE — 99999 PR PBB SHADOW E&M-EST. PATIENT-LVL II: ICD-10-PCS | Mod: PBBFAC,,, | Performed by: OPHTHALMOLOGY

## 2023-10-13 NOTE — PROGRESS NOTES
HPI    24 y/o female is here for LASIK evaluation.  She does not wear contacts   and her glasses prescription has been the same for the last 3 years.    NO eye sx  No eye drops  Last edited by Leatha Lopez on 10/13/2023  2:39 PM.            Assessment /Plan     For exam results, see Encounter Report.    Myopia of both eyes      The nature of elective laser refractive surgery and the expected range of post-operative results was discussed, including the infrequent need for enhancement. Side effects such as glare and halo and dry eye syndrome and rare complications including infection, inflammation, scarring, and ectasia were explained.  Pentacam topography and extensive pre-operative evaluations were reviewed and the patient has minimal ectasia risk.  Plan:    [place] LASIK OU  Hx Autism, mild, seems tolerant of eye manipulation  -3.25//-4.00

## 2023-12-01 ENCOUNTER — OFFICE VISIT (OUTPATIENT)
Dept: OPHTHALMOLOGY | Facility: CLINIC | Age: 23
End: 2023-12-01
Payer: MEDICAID

## 2023-12-01 DIAGNOSIS — H52.13 MYOPIA OF BOTH EYES: Primary | ICD-10-CM

## 2023-12-01 PROCEDURE — 99999 PR PBB SHADOW E&M-EST. PATIENT-LVL III: CPT | Mod: PBBFAC,,, | Performed by: OPHTHALMOLOGY

## 2023-12-01 PROCEDURE — 1160F PR REVIEW ALL MEDS BY PRESCRIBER/CLIN PHARMACIST DOCUMENTED: ICD-10-PCS | Mod: CPTII,,, | Performed by: OPHTHALMOLOGY

## 2023-12-01 PROCEDURE — 99024 PR POST-OP FOLLOW-UP VISIT: ICD-10-PCS | Mod: ,,, | Performed by: OPHTHALMOLOGY

## 2023-12-01 PROCEDURE — 1160F RVW MEDS BY RX/DR IN RCRD: CPT | Mod: CPTII,,, | Performed by: OPHTHALMOLOGY

## 2023-12-01 PROCEDURE — 99024 POSTOP FOLLOW-UP VISIT: CPT | Mod: ,,, | Performed by: OPHTHALMOLOGY

## 2023-12-01 PROCEDURE — 1159F MED LIST DOCD IN RCRD: CPT | Mod: CPTII,,, | Performed by: OPHTHALMOLOGY

## 2023-12-01 PROCEDURE — 99999 PR PBB SHADOW E&M-EST. PATIENT-LVL III: ICD-10-PCS | Mod: PBBFAC,,, | Performed by: OPHTHALMOLOGY

## 2023-12-01 PROCEDURE — 1159F PR MEDICATION LIST DOCUMENTED IN MEDICAL RECORD: ICD-10-PCS | Mod: CPTII,,, | Performed by: OPHTHALMOLOGY

## 2023-12-01 PROCEDURE — 99213 OFFICE O/P EST LOW 20 MIN: CPT | Mod: PBBFAC | Performed by: OPHTHALMOLOGY

## 2023-12-01 RX ORDER — MOXIFLOXACIN 5 MG/ML
1 SOLUTION/ DROPS OPHTHALMIC 4 TIMES DAILY
Qty: 3 ML | Refills: 3 | Status: SHIPPED | OUTPATIENT
Start: 2023-12-01 | End: 2023-12-11

## 2023-12-01 RX ORDER — DIAZEPAM 5 MG/1
5 TABLET ORAL
Qty: 5 TABLET | Refills: 0 | Status: SHIPPED | OUTPATIENT
Start: 2023-12-01 | End: 2023-12-11

## 2023-12-01 RX ORDER — PREDNISOLONE ACETATE 10 MG/ML
1 SUSPENSION/ DROPS OPHTHALMIC 4 TIMES DAILY
Qty: 10 ML | Refills: 3 | Status: SHIPPED | OUTPATIENT
Start: 2023-12-01 | End: 2023-12-11

## 2023-12-01 NOTE — PROGRESS NOTES
HPI    22 y/o female is here for LASIK Pre op.  She only wears glasses.    AT's prn OU  Last edited by Leatha Lopez on 12/1/2023 11:16 AM.            Assessment /Plan     For exam results, see Encounter Report.    Myopia of both eyes      The nature of elective laser refractive surgery and the expected range of post-operative results was discussed, including the infrequent need for enhancement. Side effects such as glare and halo and dry eye syndrome and rare complications including infection, inflammation, scarring, and ectasia were explained.  Pentacam topography and extensive pre-operative evaluations were reviewed and the patient has minimal ectasia risk.  Plan:    [place] LASIK OU  Hx Autism, mild, seems tolerant of eye manipulation  -3.25//-4.00

## 2023-12-06 ENCOUNTER — TELEPHONE (OUTPATIENT)
Dept: OPHTHALMOLOGY | Facility: CLINIC | Age: 23
End: 2023-12-06
Payer: MEDICAID

## 2023-12-06 NOTE — TELEPHONE ENCOUNTER
----- Message from Shin Montez sent at 12/5/2023  4:31 PM CST -----  Regarding: FW: Prescription Issues    ----- Message -----  From: Umm Head  Sent: 12/5/2023   3:54 PM CST  To: Christin MELENDEZ Staff  Subject: Prescription Issues                              [facility] pharmacy called about prescription for diazePAM (VALIUM) 5 MG tablet needing a prior authorization  and pharmacy needs a new prescription sent with clarification on instructions.      [facility] [address] [place] [id number] [facility]  [id number] [facility] [address]  Phone: [phone] Fax: [phone]

## 2023-12-11 ENCOUNTER — OFFICE VISIT (OUTPATIENT)
Dept: OPHTHALMOLOGY | Facility: CLINIC | Age: 23
End: 2023-12-11
Payer: MEDICAID

## 2023-12-11 DIAGNOSIS — H52.13 MYOPIA OF BOTH EYES: Primary | ICD-10-CM

## 2023-12-11 PROCEDURE — 66999 PR INTRALASE CUST LASIK/PRK: ICD-10-PCS | Mod: CSM,,, | Performed by: OPHTHALMOLOGY

## 2023-12-11 PROCEDURE — 66999 UNLISTED PX ANT SEGMENT EYE: CPT | Mod: CSM,,, | Performed by: OPHTHALMOLOGY

## 2023-12-11 PROCEDURE — 99024 PR POST-OP FOLLOW-UP VISIT: ICD-10-PCS | Mod: ,,, | Performed by: OPHTHALMOLOGY

## 2023-12-11 PROCEDURE — 99024 POSTOP FOLLOW-UP VISIT: CPT | Mod: ,,, | Performed by: OPHTHALMOLOGY

## 2023-12-11 PROCEDURE — 1160F RVW MEDS BY RX/DR IN RCRD: CPT | Mod: CPTII,,, | Performed by: OPHTHALMOLOGY

## 2023-12-11 PROCEDURE — 99212 OFFICE O/P EST SF 10 MIN: CPT | Mod: PBBFAC | Performed by: OPHTHALMOLOGY

## 2023-12-11 PROCEDURE — 1159F PR MEDICATION LIST DOCUMENTED IN MEDICAL RECORD: ICD-10-PCS | Mod: CPTII,,, | Performed by: OPHTHALMOLOGY

## 2023-12-11 PROCEDURE — 1160F PR REVIEW ALL MEDS BY PRESCRIBER/CLIN PHARMACIST DOCUMENTED: ICD-10-PCS | Mod: CPTII,,, | Performed by: OPHTHALMOLOGY

## 2023-12-11 PROCEDURE — 99999 PR PBB SHADOW E&M-EST. PATIENT-LVL II: ICD-10-PCS | Mod: PBBFAC,,, | Performed by: OPHTHALMOLOGY

## 2023-12-11 PROCEDURE — 1159F MED LIST DOCD IN RCRD: CPT | Mod: CPTII,,, | Performed by: OPHTHALMOLOGY

## 2023-12-11 PROCEDURE — 99999 PR PBB SHADOW E&M-EST. PATIENT-LVL II: CPT | Mod: PBBFAC,,, | Performed by: OPHTHALMOLOGY

## 2023-12-11 NOTE — PROGRESS NOTES
HPI    24 y/o female is here for LASIK OU.  Last edited by Leatha Lopez on 12/11/2023 12:43 PM.            Assessment /Plan     For exam results, see Encounter Report.    Myopia of both eyes      SURGEON:  Elida Waller M.D.    PREOPERATIVE DIAGNOSIS: Ammetropia Both Eye(s)    POSTOPERATIVE DIAGNOSIS:  Ammetropia Both  Eye(s)    PROCEDURES:    LASIK Both  Eye(s)    ANESTHESIA: Topical Tetracaine 0.5%    COMPLICATIONS:  None    ESTIMATED BLOOD LOSS: None    SPECIMENS: None    INDICATIONS:  The patient has a history a stable refractive error and desires decreased dependence on spectacles and contact lenses.  During the initial consultation, a thorough discussion regarding of the risks, benefits, and alternatives to this elective procedure was undertaken.  Risks were listed on the consent form and were reviewed with emphasis on the remote possibility of infection, inflammation (DLK), scarring, flap complications, and ectasia - all of which can potentially lead to loss of vision.  Common side effects from the procedure, including dry eye, glare, and haloes, were also explained, as well as defining realistic expectations of decreasing but not necessarily eliminating the need for glasses or contact lenses.  The patient voices understanding of these risks and side effects and communicates realistic expectations from the procedure.  A complete evaluation of ectasia risk factors was also performed, and though the patient exhibits an extremely low risk, these risks were nonetheless reviewed.    DESCRIPTION OF PROCEDURE:  The patient was admitted to the [facility] at [facility] where the operative eye and procedure were verified, vital signs were taken, and pre-operatively 5-10mg of oral diazepam and drops of Zymar and Pred Forte were administered.  The patient was brought into the LASER suite and positioned on the LASER bed.  The patient interface was centered on the eye and adequate suction was verified by  applanation of high intraocular pressure.The femtosecond LASER was used to create a thin flap in the anterior corneal stroma. The correct treatment was verified by both the surgeon and technician. The eye was then prepped and draped in a sterile fashion with Betadine and a lid speculum placed in the eye. The flap was marked, carefully lifted, and the pupil tracker and IR were engaged.  The VISX S4 Excimer LASER was used to apply the corrective treatment.  The flap was then meticulously replaced with an irrigating cannula, and drops of Zymar and Pred Forte were administered.      *These steps were then repeated for the second eye, as this was a bilateral procedure.    The patient was brought to the post operative recovery area for evaluation at the slit lamp by Dr Waller.  The patient was discharged with eye shields, care instructions, and an appointment tomorrow morning in the eye clinic.

## 2023-12-12 ENCOUNTER — OFFICE VISIT (OUTPATIENT)
Dept: OPHTHALMOLOGY | Facility: CLINIC | Age: 23
End: 2023-12-12
Payer: MEDICAID

## 2023-12-12 DIAGNOSIS — Z98.890 S/P LASIK (LASER ASSISTED IN SITU KERATOMILEUSIS): Primary | ICD-10-CM

## 2023-12-12 PROCEDURE — 99024 PR POST-OP FOLLOW-UP VISIT: ICD-10-PCS | Mod: ,,, | Performed by: OPHTHALMOLOGY

## 2023-12-12 PROCEDURE — 1159F PR MEDICATION LIST DOCUMENTED IN MEDICAL RECORD: ICD-10-PCS | Mod: CPTII,,, | Performed by: OPHTHALMOLOGY

## 2023-12-12 PROCEDURE — 99999 PR PBB SHADOW E&M-EST. PATIENT-LVL I: CPT | Mod: PBBFAC,,, | Performed by: OPHTHALMOLOGY

## 2023-12-12 PROCEDURE — 1160F RVW MEDS BY RX/DR IN RCRD: CPT | Mod: CPTII,,, | Performed by: OPHTHALMOLOGY

## 2023-12-12 PROCEDURE — 99024 POSTOP FOLLOW-UP VISIT: CPT | Mod: ,,, | Performed by: OPHTHALMOLOGY

## 2023-12-12 PROCEDURE — 99999 PR PBB SHADOW E&M-EST. PATIENT-LVL I: ICD-10-PCS | Mod: PBBFAC,,, | Performed by: OPHTHALMOLOGY

## 2023-12-12 PROCEDURE — 1160F PR REVIEW ALL MEDS BY PRESCRIBER/CLIN PHARMACIST DOCUMENTED: ICD-10-PCS | Mod: CPTII,,, | Performed by: OPHTHALMOLOGY

## 2023-12-12 PROCEDURE — 99211 OFF/OP EST MAY X REQ PHY/QHP: CPT | Mod: PBBFAC | Performed by: OPHTHALMOLOGY

## 2023-12-12 PROCEDURE — 1159F MED LIST DOCD IN RCRD: CPT | Mod: CPTII,,, | Performed by: OPHTHALMOLOGY

## 2023-12-12 NOTE — PROGRESS NOTES
HPI    Seeing well  Feeling good    Last edited by Elida Waller MD on 12/12/2023  3:09 PM.            Assessment /Plan     For exam results, see Encounter Report.    S/P LASIK (laser assisted in situ keratomileusis)      Post op laser refractive surgery. Lasik flaps are well positioned and there are no striae or DLK present. Appropriate precautions and post op medications reviewed.  Patient instructed to call or come in if symptoms of redness, decreased vision, or pain are experienced.  Meibum peripherally OU  Monitor

## 2023-12-19 ENCOUNTER — OFFICE VISIT (OUTPATIENT)
Dept: OPHTHALMOLOGY | Facility: CLINIC | Age: 23
End: 2023-12-19
Payer: MEDICAID

## 2023-12-19 DIAGNOSIS — Z98.890 S/P LASIK (LASER ASSISTED IN SITU KERATOMILEUSIS): Primary | ICD-10-CM

## 2023-12-19 PROCEDURE — 99024 POSTOP FOLLOW-UP VISIT: CPT | Mod: ,,, | Performed by: OPHTHALMOLOGY

## 2023-12-19 PROCEDURE — 1159F PR MEDICATION LIST DOCUMENTED IN MEDICAL RECORD: ICD-10-PCS | Mod: CPTII,,, | Performed by: OPHTHALMOLOGY

## 2023-12-19 PROCEDURE — 99999 PR PBB SHADOW E&M-EST. PATIENT-LVL III: ICD-10-PCS | Mod: PBBFAC,,, | Performed by: OPHTHALMOLOGY

## 2023-12-19 PROCEDURE — 1159F MED LIST DOCD IN RCRD: CPT | Mod: CPTII,,, | Performed by: OPHTHALMOLOGY

## 2023-12-19 PROCEDURE — 1160F RVW MEDS BY RX/DR IN RCRD: CPT | Mod: CPTII,,, | Performed by: OPHTHALMOLOGY

## 2023-12-19 PROCEDURE — 1160F PR REVIEW ALL MEDS BY PRESCRIBER/CLIN PHARMACIST DOCUMENTED: ICD-10-PCS | Mod: CPTII,,, | Performed by: OPHTHALMOLOGY

## 2023-12-19 PROCEDURE — 99024 PR POST-OP FOLLOW-UP VISIT: ICD-10-PCS | Mod: ,,, | Performed by: OPHTHALMOLOGY

## 2023-12-19 PROCEDURE — 99999 PR PBB SHADOW E&M-EST. PATIENT-LVL III: CPT | Mod: PBBFAC,,, | Performed by: OPHTHALMOLOGY

## 2023-12-19 PROCEDURE — 99213 OFFICE O/P EST LOW 20 MIN: CPT | Mod: PBBFAC | Performed by: OPHTHALMOLOGY

## 2023-12-19 NOTE — PROGRESS NOTES
HPI    S/p Lasik - 12/12/2023    24 y/o female is here for 1 week Lasik  post-op of both eyes. She states   vision is doing is well. She denies pain and discomfort.     Eyemeds  PF/Oflox QID OU  At's OU PRN  Last edited by Aram Cerda on 12/19/2023  1:38 PM.            Assessment /Plan     For exam results, see Encounter Report.    S/P LASIK (laser assisted in situ keratomileusis)      Post op laser refractive surgery. Lasik flaps are well positioned and there are no striae or DLK present. Appropriate precautions and post op medications reviewed.  Patient instructed to call or come in if symptoms of redness, decreased vision, or pain are experienced.  Looks great.  Taper Pred

## 2024-01-02 ENCOUNTER — TELEPHONE (OUTPATIENT)
Dept: HEMATOLOGY/ONCOLOGY | Facility: CLINIC | Age: 24
End: 2024-01-02

## 2024-01-02 NOTE — TELEPHONE ENCOUNTER
I spoke with pt mother and reminded her that pt needs labs done prior to appt here on 1/8/24 she verbalized understanding.

## 2024-01-05 ENCOUNTER — TELEPHONE (OUTPATIENT)
Dept: OPHTHALMOLOGY | Facility: CLINIC | Age: 24
End: 2024-01-05
Payer: MEDICAID

## 2024-01-05 ENCOUNTER — LAB VISIT (OUTPATIENT)
Dept: LAB | Facility: HOSPITAL | Age: 24
End: 2024-01-05
Attending: INTERNAL MEDICINE
Payer: MEDICAID

## 2024-01-05 ENCOUNTER — TELEPHONE (OUTPATIENT)
Dept: HEMATOLOGY/ONCOLOGY | Facility: CLINIC | Age: 24
End: 2024-01-05

## 2024-01-05 DIAGNOSIS — D50.9 IRON DEFICIENCY ANEMIA, UNSPECIFIED IRON DEFICIENCY ANEMIA TYPE: ICD-10-CM

## 2024-01-05 LAB
ALBUMIN SERPL BCP-MCNC: 4.1 G/DL (ref 3.5–5.2)
ALP SERPL-CCNC: 66 U/L (ref 55–135)
ALT SERPL W/O P-5'-P-CCNC: 14 U/L (ref 10–44)
ANION GAP SERPL CALC-SCNC: 5 MMOL/L (ref 8–16)
AST SERPL-CCNC: 14 U/L (ref 10–40)
BASOPHILS # BLD AUTO: 0.06 K/UL (ref 0–0.2)
BASOPHILS NFR BLD: 1.1 % (ref 0–1.9)
BILIRUB SERPL-MCNC: 0.3 MG/DL (ref 0.1–1)
BUN SERPL-MCNC: 10 MG/DL (ref 6–20)
CALCIUM SERPL-MCNC: 8.9 MG/DL (ref 8.7–10.5)
CHLORIDE SERPL-SCNC: 104 MMOL/L (ref 95–110)
CO2 SERPL-SCNC: 29 MMOL/L (ref 23–29)
CREAT SERPL-MCNC: 0.6 MG/DL (ref 0.5–1.4)
DIFFERENTIAL METHOD BLD: ABNORMAL
EOSINOPHIL # BLD AUTO: 0.9 K/UL (ref 0–0.5)
EOSINOPHIL NFR BLD: 17.2 % (ref 0–8)
ERYTHROCYTE [DISTWIDTH] IN BLOOD BY AUTOMATED COUNT: 12.7 % (ref 11.5–14.5)
EST. GFR  (NO RACE VARIABLE): >60 ML/MIN/1.73 M^2
FERRITIN SERPL-MCNC: 7.5 NG/ML (ref 20–300)
GLUCOSE SERPL-MCNC: 98 MG/DL (ref 70–110)
HCT VFR BLD AUTO: 38.4 % (ref 37–48.5)
HGB BLD-MCNC: 12.8 G/DL (ref 12–16)
IMM GRANULOCYTES # BLD AUTO: 0.01 K/UL (ref 0–0.04)
IMM GRANULOCYTES NFR BLD AUTO: 0.2 % (ref 0–0.5)
IRON SERPL-MCNC: 52 UG/DL (ref 30–160)
LYMPHOCYTES # BLD AUTO: 2.5 K/UL (ref 1–4.8)
LYMPHOCYTES NFR BLD: 46.4 % (ref 18–48)
MCH RBC QN AUTO: 29.4 PG (ref 27–31)
MCHC RBC AUTO-ENTMCNC: 33.3 G/DL (ref 32–36)
MCV RBC AUTO: 88 FL (ref 82–98)
MONOCYTES # BLD AUTO: 0.3 K/UL (ref 0.3–1)
MONOCYTES NFR BLD: 5.8 % (ref 4–15)
NEUTROPHILS # BLD AUTO: 1.6 K/UL (ref 1.8–7.7)
NEUTROPHILS NFR BLD: 29.3 % (ref 38–73)
NRBC BLD-RTO: 0 /100 WBC
PLATELET # BLD AUTO: 294 K/UL (ref 150–450)
PMV BLD AUTO: 10.1 FL (ref 9.2–12.9)
POTASSIUM SERPL-SCNC: 3.9 MMOL/L (ref 3.5–5.1)
PROT SERPL-MCNC: 6.9 G/DL (ref 6–8.4)
RBC # BLD AUTO: 4.36 M/UL (ref 4–5.4)
SATURATED IRON: 15 % (ref 20–50)
SODIUM SERPL-SCNC: 138 MMOL/L (ref 136–145)
TOTAL IRON BINDING CAPACITY: 350 UG/DL (ref 250–450)
TRANSFERRIN SERPL-MCNC: 250 MG/DL (ref 200–375)
WBC # BLD AUTO: 5.34 K/UL (ref 3.9–12.7)

## 2024-01-05 PROCEDURE — 82728 ASSAY OF FERRITIN: CPT | Performed by: INTERNAL MEDICINE

## 2024-01-05 PROCEDURE — 83540 ASSAY OF IRON: CPT | Performed by: INTERNAL MEDICINE

## 2024-01-05 PROCEDURE — 36415 COLL VENOUS BLD VENIPUNCTURE: CPT | Performed by: INTERNAL MEDICINE

## 2024-01-05 PROCEDURE — 85025 COMPLETE CBC W/AUTO DIFF WBC: CPT | Performed by: INTERNAL MEDICINE

## 2024-01-05 PROCEDURE — 80053 COMPREHEN METABOLIC PANEL: CPT | Performed by: INTERNAL MEDICINE

## 2024-01-05 NOTE — TELEPHONE ENCOUNTER
----- Message from Umm Head sent at 1/5/2024 11:53 AM CST -----  Regarding: Post op  Patients mother called in regards to r/s pts post to 1/12 due to appt conflict.    Please call back to further [phone]

## 2024-01-05 NOTE — TELEPHONE ENCOUNTER
I spoke with pt mother and reminded her to have labs done for appt here on 1/8/24 she states that the pt is going today to do them.

## 2024-01-09 ENCOUNTER — OFFICE VISIT (OUTPATIENT)
Dept: HEMATOLOGY/ONCOLOGY | Facility: CLINIC | Age: 24
End: 2024-01-09
Payer: MEDICAID

## 2024-01-09 VITALS
SYSTOLIC BLOOD PRESSURE: 109 MMHG | TEMPERATURE: 99 F | BODY MASS INDEX: 24.03 KG/M2 | DIASTOLIC BLOOD PRESSURE: 49 MMHG | HEART RATE: 75 BPM | RESPIRATION RATE: 16 BRPM | WEIGHT: 144.38 LBS

## 2024-01-09 DIAGNOSIS — D50.9 IRON DEFICIENCY ANEMIA, UNSPECIFIED IRON DEFICIENCY ANEMIA TYPE: Primary | ICD-10-CM

## 2024-01-09 PROCEDURE — 3078F DIAST BP <80 MM HG: CPT | Mod: CPTII,S$GLB,, | Performed by: INTERNAL MEDICINE

## 2024-01-09 PROCEDURE — 3008F BODY MASS INDEX DOCD: CPT | Mod: CPTII,S$GLB,, | Performed by: INTERNAL MEDICINE

## 2024-01-09 PROCEDURE — 3074F SYST BP LT 130 MM HG: CPT | Mod: CPTII,S$GLB,, | Performed by: INTERNAL MEDICINE

## 2024-01-09 PROCEDURE — 1159F MED LIST DOCD IN RCRD: CPT | Mod: CPTII,S$GLB,, | Performed by: INTERNAL MEDICINE

## 2024-01-09 PROCEDURE — 1160F RVW MEDS BY RX/DR IN RCRD: CPT | Mod: CPTII,S$GLB,, | Performed by: INTERNAL MEDICINE

## 2024-01-09 PROCEDURE — 99213 OFFICE O/P EST LOW 20 MIN: CPT | Mod: S$GLB,,, | Performed by: INTERNAL MEDICINE

## 2024-01-09 NOTE — PROGRESS NOTES
"[facility] Hematology/Oncology  PROGRESS NOTE - Follow-up Visit      Subjective:       Patient ID:   NAME: Alanna Hammer : 2000     23 y.o. female    Referring Doc: Danielle Angel MD  Other Physicians: Dr. Costa, Dr. Conteh, Dr. Diane,             Chief Complaint: Iron Deficiency Anemia  f/u     History of Present Illness:     Patient returns today for a regularly scheduled follow-up visit.  The patient is here today to go over the results of the recently ordered labs, tests and studies. She is here with her mom today.       She is one month out from Lasik eye surgery with Dr Waller and did well.    She states she has been feeling "fine" ;      She last saw Cristy Valentin NP in 2023     No CP, SOB, HA's or N/V              ROS:   GEN: normal without any fever, night sweats or weight loss;  more energy and no longer craving ice  HEENT: normal with no HA's, sore throat, stiff neck, changes in vision  CV: normal with no CP, SOB, PND, DAVIS or orthopnea  PULM: normal with no SOB, cough, hemoptysis, sputum or pleuritic pain  GI: normal with no abdominal pain, nausea, vomiting, constipation, diarrhea, melanotic stools, BRBPR, or hematemesis  : normal with no hematuria, dysuria  BREAST: normal with no mass, discharge, pain  SKIN: normal with no rash, erythema, bruising, or swelling    Pain Scale: 0     Allergies:  Review of patient's allergies indicates:   Allergen Reactions    Egg derived      Egg white    Lactose        Medications:    Current Outpatient Medications:     ammonium lactate 12 % Crea, Apply topically 2 (two) times daily., Disp: , Rfl:     diphenhydrAMINE (BENYLIN) 12.5 mg/5 mL liquid, Take by mouth 4 (four) times daily as needed for Allergies., Disp: , Rfl:     ibuprofen (ADVIL,MOTRIN) 100 mg/5 mL suspension, Take 15 mLs by mouth every 6 (six) hours as needed for Temperature greater than., Disp: , Rfl:     iron fum-B12-IF-C-folic acid (FOLTRIN) 110-0.5 mg capsule, Take 1 capsule " by mouth 2 (two) times daily., Disp: , Rfl:     lactase (LACTAID) 3,000 unit tablet, Take 1 tablet by mouth 3 (three) times daily with meals., Disp: , Rfl:     Lactobacillus acidophilus (PROBIOTIC ACIDOPHILUS ORAL), Take by mouth., Disp: , Rfl:     metronidazole 0.75% (METROCREAM) 0.75 % Crea, Apply topically 2 (two) times daily., Disp: , Rfl:     polyethylene glycol (GLYCOLAX) 17 gram/dose powder, Take 17 g by mouth as needed., Disp: , Rfl:     polysaccharide iron complex (NOVAFERRUM 50) 50 mg iron Cap, Take 1 capsule by mouth once daily., Disp: 90 capsule, Rfl: 2    diazePAM (VALIUM) 5 MG tablet, Take 1 tablet (5 mg total) by mouth as needed for Anxiety., Disp: 5 tablet, Rfl: 0    PMHx/PSHx Updates:  See patient's last visit with me on 9/7/2023  See H&P on 12/2/2022        Pathology:   Cancer Staging   No matching staging information was found for the patient.          Objective:     Vitals:  Blood pressure (!) 109/49, pulse 75, temperature 98.7 °F (37.1 °C), resp. rate 16, weight 65.5 kg (144 lb 6.4 oz).    Physical Examination:   GEN: no apparent distress, comfortable; AAOx3  HEAD: atraumatic and normocephalic  EYES: no pallor, no icterus, PERRLA  ENT: OMM, no pharyngeal erythema, external ears WNL; no nasal discharge; no thrush  NECK: no masses, thyroid normal, trachea midline, no LAD/LN's, supple  CV: RRR with no murmur; normal pulse; normal S1 and S2; no pedal edema  CHEST: Normal respiratory effort; CTAB; normal breath sounds; no wheeze or crackles  ABDOM: nontender and nondistended; soft; normal bowel sounds; no rebound/guarding  MUSC/Skeletal: ROM normal; no crepitus; joints normal; no deformities or arthropathy  EXTREM: no clubbing, cyanosis, inflammation or swelling  SKIN: no rashes, lesions, ulcers, petechiae or subcutaneous nodules  : no koch  NEURO: grossly intact; motor/sensory WNL; AAOx3; no tremors  PSYCH: normal mood, affect and behavior  LYMPH: normal cervical, supraclavicular, axillary and  leroy LEOS's            Labs:     Lab Results   Component Value Date    WBC 5.34 01/05/2024    HGB 12.8 01/05/2024    HCT 38.4 01/05/2024    MCV 88 01/05/2024     01/05/2024       CMP  Sodium   Date Value Ref Range Status   01/05/2024 138 136 - 145 mmol/L Final     Potassium   Date Value Ref Range Status   01/05/2024 3.9 3.5 - 5.1 mmol/L Final     Chloride   Date Value Ref Range Status   01/05/2024 104 95 - 110 mmol/L Final     CO2   Date Value Ref Range Status   01/05/2024 29 23 - 29 mmol/L Final     Glucose   Date Value Ref Range Status   01/05/2024 98 70 - 110 mg/dL Final     BUN   Date Value Ref Range Status   01/05/2024 10 6 - 20 mg/dL Final     Creatinine   Date Value Ref Range Status   01/05/2024 0.6 0.5 - 1.4 mg/dL Final     Calcium   Date Value Ref Range Status   01/05/2024 8.9 8.7 - 10.5 mg/dL Final     Total Protein   Date Value Ref Range Status   01/05/2024 6.9 6.0 - 8.4 g/dL Final     Albumin   Date Value Ref Range Status   01/05/2024 4.1 3.5 - 5.2 g/dL Final     Total Bilirubin   Date Value Ref Range Status   01/05/2024 0.3 0.1 - 1.0 mg/dL Final     Comment:     For infants and newborns, interpretation of results should be based  on gestational age, weight and in agreement with clinical  observations.    Premature Infant recommended reference ranges:  Up to 24 hours.............<8.0 mg/dL  Up to 48 hours............<12.0 mg/dL  3-5 days..................<15.0 mg/dL  6-29 days.................<15.0 mg/dL       Alkaline Phosphatase   Date Value Ref Range Status   01/05/2024 66 55 - 135 U/L Final     AST   Date Value Ref Range Status   01/05/2024 14 10 - 40 U/L Final     ALT   Date Value Ref Range Status   01/05/2024 14 10 - 44 U/L Final     Anion Gap   Date Value Ref Range Status   01/05/2024 5 (L) 8 - 16 mmol/L Final     eGFR   Date Value Ref Range Status   01/05/2024 >60.0 >60 mL/min/1.73 m^2 Final         Lab Results   Component Value Date    IRON 52 01/05/2024    TRANSFERRIN 250 01/05/2024     TIBC 350 01/05/2024    FESATURATED 15 (L) 01/05/2024      Lab Results   Component Value Date    FERRITIN 7.5 (L) 01/05/2024              Hgb F                          0.0              %        MB     Reference Range: 0.0-2.0                                 Hgb A                          97.7             %        MB     Reference Range: 96.4-98.8                               Hgb A2                         2.3              %        MB     Reference Range: 1.8-3.2                                 Hgb S                          0.0              %        MB     Reference Range: 0.0                                         Alpha-Thalassemia              Comment:                  TG     Test: Alpha-Thalassemia, DNA Analysis   Result:   Negative, alpha alpha/alpha alpha     Radiology/Diagnostic Studies:  US BREAST LEFT LIMITED     CLINICAL HISTORY:  Unspecified lump in the left breast, upper outer quadrant     TECHNIQUE:  CMS MANDATED QUALITY DATA - MAMMOGRAM - 225     This [facility] utilizes a reminder system to ensure that all patients receive reminder letters and/or direct phone calls for appointments. This includes reminders for routine screening mammograms, diagnostic mammograms, and other breast imaging interventions when appropriate. This patient will be placed in the appropriate reminder system.     The interpretation was assisted by Computer Aided Detection with [employer].     COMPARISON:  None     FINDINGS:  Grayscale evaluation of the left breast 3-4 o'clock area of interest, 6 cm from the nipple was performed.  Of note, patient reports the palpable abnormality is intermittent, and does not feel it today.  Normal fibroglandular elements are evident in this region.  No suspicious cystic or solid mass.     Impression:     Negative targeted left breast ultrasound.     Clinical follow-up and screening mammography at age 40 recommended.     BI-RADS CATEGORY 1: NEGATIVE.    I have reviewed all  available lab results and radiology reports.    Assessment/Plan:   (1) 23 y.o. female with diagnosis of iron deficiency anemia. She has been on oral iron for three months with only minimal improvements.   - orders placed for IV iron x 4  - recheck labs after 4 weeks and then follow up with Dr. Issa   - Thalessemia and hemoglobinopathy     1/11/2023:  - she is getting 4th IV iron today  - check labs monthly - will have done next week    3/8/2023:  - s/p last IV iron was last week  - latest labs are good with hgb WNL; iron panel now WNL    6/15/2023:  - hgb, iron and ferritin remain wnl  - check labs again in 3 month  - encourage the use of oral iron    9/7/2023:  - latest hgb was 13.4  - encouraged her to resume oral iron  - latest iron 52 and ferritin 24    1/9/2024:  - latest CBC is adequate, with no anemia  - last iron infusion was in March 2023  - Iron sat 15%; ferritin 7.5  - discussed resumption of oral iron       (2) Gastritis   - follow up with GI   - takes a probiotic daily      (3) Autism/OCD            VISIT DIAGNOSES:      Iron deficiency anemia, unspecified iron deficiency anemia type            PLAN:  1. Check labs monthly and resume IV iron as needed; encouraged her to resume the oral iron  2. Encouraged use of oral iron  3.  F/u with GYN, PCP  4. Follow up with GI as needed         RTC in  3-4 months      Fax note to  Cristy Valentin FNP-C, and [place]    Discussion:     COVID-19 Discussion:    I had long discussion with patient and any applicable family about the COVID-19 coronavirus epidemic and the recommended precautions with regard to cancer and/or hematology patients. I have re-iterated the CDC recommendations for adequate hand washing, use of hand -like products, and coughing into elbow, etc. In addition, especially for our patients who are on chemotherapy and/or our otherwise immunocompromised patients, I have recommended avoidance of crowds, including movie theaters, restaurants,  churches, etc. I have recommended avoidance of any sick or symptomatic family members and/or friends. I have also recommended avoidance of any raw and unwashed food products, and general avoidance of food items that have not been prepared by themselves. The patient has been asked to call us immediately with any symptom developments, issues, questions or other general concerns.       Iron Infusion Therapy Discussion:     I provided literature/learning materials on the particular IV iron regimen and discussed the potential side-effect profiles of the drug(s). I discussed the importance of compliance with obtaining and monitoring requested lab work, and went over the potential risk for the development of anaphylactic shock, bronchospasm, dysrhythmia, liver and/or kidney damage, and respiratory/cardiovascular arrest and/or failure. I discussed the potential risks for development of alopecia, fevers, itching, chills and/or rigors, cold sensory issues, ringing in ears, vertigo and neuropathy, all of which are usually acute but sometimes could end up being chronic and life-long. I discussed the risks of hand-foot syndrome and rashes, and development of other autoimmune mediated processes such as pneumonitis and colitis which could be life threatening.     The patient's consent has been obtained to proceed with the IV iron therapy.The patient will be referred to [facility] for training and education on IV iron therapy, use of antiemetics and/or anti-diarrheals, use of NSAID's, potential IV iron therapy side-effects, and any specific recommendations and precautions with the particular IV iron agents.      I answered all of the patient's (and family's, if applicable) questions to the best of my ability and to their complete satisfaction. The patient acknowledged full understanding of the risks, recommendations and plan(s).     I spent over 25 mins of time with the patient. Reviewed results of the  recently ordered labs, tests and studies; made directives with regards to the results. Over half of this time was spent couseling and coordinating care.    I have explained all of the above in detail and the patient understands all of the current recommendation(s). I have answered all of their questions to the best of my ability and to their complete satisfaction.   The patient is to continue with the current management plan.            Electronically signed by Gustavo Issa MD

## 2024-01-10 ENCOUNTER — OFFICE VISIT (OUTPATIENT)
Dept: OPHTHALMOLOGY | Facility: CLINIC | Age: 24
End: 2024-01-10
Attending: OPHTHALMOLOGY
Payer: MEDICAID

## 2024-01-10 DIAGNOSIS — Z98.890 S/P LASIK (LASER ASSISTED IN SITU KERATOMILEUSIS): Primary | ICD-10-CM

## 2024-01-10 PROCEDURE — 99024 POSTOP FOLLOW-UP VISIT: CPT | Mod: ,,, | Performed by: OPHTHALMOLOGY

## 2024-01-10 PROCEDURE — 1159F MED LIST DOCD IN RCRD: CPT | Mod: CPTII,,, | Performed by: OPHTHALMOLOGY

## 2024-01-10 PROCEDURE — 99213 OFFICE O/P EST LOW 20 MIN: CPT | Mod: PBBFAC | Performed by: OPHTHALMOLOGY

## 2024-01-10 PROCEDURE — 99999 PR PBB SHADOW E&M-EST. PATIENT-LVL III: CPT | Mod: PBBFAC,,, | Performed by: OPHTHALMOLOGY

## 2024-01-10 PROCEDURE — 1160F RVW MEDS BY RX/DR IN RCRD: CPT | Mod: CPTII,,, | Performed by: OPHTHALMOLOGY

## 2024-01-10 NOTE — PROGRESS NOTES
HPI    Patient present today for LASIK f/u  Pt state no complaints at this time     S/p Lasik - 12/12/2023      Last edited by Jose íDaz on 1/10/2024  8:48 AM.            Assessment /Plan     For exam results, see Encounter Report.    S/P LASIK (laser assisted in situ keratomileusis)      Post op laser refractive surgery. Lasik flaps are well positioned and there are no striae or DLK present. Appropriate precautions and post op medications reviewed.  Patient instructed to call or come in if symptoms of redness, decreased vision, or pain are experienced.

## 2024-05-20 ENCOUNTER — LAB VISIT (OUTPATIENT)
Dept: LAB | Facility: HOSPITAL | Age: 24
End: 2024-05-20
Attending: INTERNAL MEDICINE
Payer: MEDICAID

## 2024-05-20 DIAGNOSIS — D50.9 IRON DEFICIENCY ANEMIA, UNSPECIFIED IRON DEFICIENCY ANEMIA TYPE: ICD-10-CM

## 2024-05-20 LAB
ALBUMIN SERPL BCP-MCNC: 4.2 G/DL (ref 3.5–5.2)
ALP SERPL-CCNC: 68 U/L (ref 55–135)
ALT SERPL W/O P-5'-P-CCNC: 10 U/L (ref 10–44)
ANION GAP SERPL CALC-SCNC: 7 MMOL/L (ref 8–16)
AST SERPL-CCNC: 14 U/L (ref 10–40)
BASOPHILS # BLD AUTO: 0.09 K/UL (ref 0–0.2)
BASOPHILS NFR BLD: 1.4 % (ref 0–1.9)
BILIRUB SERPL-MCNC: 0.4 MG/DL (ref 0.1–1)
BUN SERPL-MCNC: 6 MG/DL (ref 6–20)
CALCIUM SERPL-MCNC: 9.1 MG/DL (ref 8.7–10.5)
CHLORIDE SERPL-SCNC: 102 MMOL/L (ref 95–110)
CO2 SERPL-SCNC: 26 MMOL/L (ref 23–29)
CREAT SERPL-MCNC: 0.6 MG/DL (ref 0.5–1.4)
DIFFERENTIAL METHOD BLD: ABNORMAL
EOSINOPHIL # BLD AUTO: 1 K/UL (ref 0–0.5)
EOSINOPHIL NFR BLD: 15.5 % (ref 0–8)
ERYTHROCYTE [DISTWIDTH] IN BLOOD BY AUTOMATED COUNT: 13 % (ref 11.5–14.5)
EST. GFR  (NO RACE VARIABLE): >60 ML/MIN/1.73 M^2
FERRITIN SERPL-MCNC: 8 NG/ML (ref 20–300)
FERRITIN SERPL-MCNC: 8 NG/ML (ref 20–300)
GLUCOSE SERPL-MCNC: 82 MG/DL (ref 70–110)
HCT VFR BLD AUTO: 39.2 % (ref 37–48.5)
HGB BLD-MCNC: 12.9 G/DL (ref 12–16)
IMM GRANULOCYTES # BLD AUTO: 0.01 K/UL (ref 0–0.04)
IMM GRANULOCYTES NFR BLD AUTO: 0.2 % (ref 0–0.5)
IRON SERPL-MCNC: 111 UG/DL (ref 30–160)
LYMPHOCYTES # BLD AUTO: 2.2 K/UL (ref 1–4.8)
LYMPHOCYTES NFR BLD: 35.7 % (ref 18–48)
MCH RBC QN AUTO: 29 PG (ref 27–31)
MCHC RBC AUTO-ENTMCNC: 32.9 G/DL (ref 32–36)
MCV RBC AUTO: 88 FL (ref 82–98)
MONOCYTES # BLD AUTO: 0.4 K/UL (ref 0.3–1)
MONOCYTES NFR BLD: 7 % (ref 4–15)
NEUTROPHILS # BLD AUTO: 2.5 K/UL (ref 1.8–7.7)
NEUTROPHILS NFR BLD: 40.2 % (ref 38–73)
NRBC BLD-RTO: 0 /100 WBC
PLATELET # BLD AUTO: 346 K/UL (ref 150–450)
PMV BLD AUTO: 10.2 FL (ref 9.2–12.9)
POTASSIUM SERPL-SCNC: 3.9 MMOL/L (ref 3.5–5.1)
PROT SERPL-MCNC: 7.2 G/DL (ref 6–8.4)
RBC # BLD AUTO: 4.45 M/UL (ref 4–5.4)
SATURATED IRON: 30 % (ref 20–50)
SODIUM SERPL-SCNC: 135 MMOL/L (ref 136–145)
TOTAL IRON BINDING CAPACITY: 371 UG/DL (ref 250–450)
TRANSFERRIN SERPL-MCNC: 265 MG/DL (ref 200–375)
WBC # BLD AUTO: 6.27 K/UL (ref 3.9–12.7)

## 2024-05-20 PROCEDURE — 83540 ASSAY OF IRON: CPT | Performed by: INTERNAL MEDICINE

## 2024-05-20 PROCEDURE — 82728 ASSAY OF FERRITIN: CPT | Performed by: INTERNAL MEDICINE

## 2024-05-20 PROCEDURE — 36415 COLL VENOUS BLD VENIPUNCTURE: CPT | Performed by: INTERNAL MEDICINE

## 2024-05-20 PROCEDURE — 85025 COMPLETE CBC W/AUTO DIFF WBC: CPT | Performed by: INTERNAL MEDICINE

## 2024-05-20 PROCEDURE — 80053 COMPREHEN METABOLIC PANEL: CPT | Performed by: INTERNAL MEDICINE

## 2024-05-20 NOTE — PROGRESS NOTES
"[facility] Hematology/Oncology  PROGRESS NOTE - Follow-up Visit      Subjective:       Patient ID:   NAME: Alanna Hammer : 2000     23 y.o. female    Referring Doc: Danielle Angel MD  Other Physicians: Dr. Costa, Dr. Conteh, Dr. Diane,             Chief Complaint: Iron Deficiency Anemia  f/u     History of Present Illness:     Patient returns today for a regularly scheduled follow-up visit.  The patient is here today to go over the results of the recently ordered labs, tests and studies. She is here with her mom today.       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     She states she has been feeling "ok'; she recently had crown done and has some residual dental ache      She last saw Cristy Valentin NP in 2023     No CP, SOB, HA's or N/V              ROS:   GEN: normal without any fever, night sweats or weight loss;  more energy and no longer craving ice  HEENT: normal with no HA's, sore throat, stiff neck, changes in vision  CV: normal with no CP, SOB, PND, DAVIS or orthopnea  PULM: normal with no SOB, cough, hemoptysis, sputum or pleuritic pain  GI: normal with no abdominal pain, nausea, vomiting, constipation, diarrhea, melanotic stools, BRBPR, or hematemesis  : normal with no hematuria, dysuria  BREAST: normal with no mass, discharge, pain  SKIN: normal with no rash, erythema, bruising, or swelling    Pain Scale: 0     Allergies:  Review of patient's allergies indicates:   Allergen Reactions    Egg derived      Egg white    Lactose        Medications:    Current Outpatient Medications:     acetaminophen (TYLENOL) 80 MG Chew, Take by mouth., Disp: , Rfl:     ammonium lactate 12 % Crea, Apply topically 2 (two) times daily., Disp: , Rfl:     diphenhydrAMINE (BENYLIN) 12.5 mg/5 mL liquid, Take by mouth 4 (four) times daily as needed for Allergies., Disp: , Rfl:     ibuprofen (ADVIL,MOTRIN) 100 mg/5 mL suspension, Take 15 mLs by mouth every 6 (six) hours as needed for " "Temperature greater than., Disp: , Rfl:     iron fum-B12-IF-C-folic acid (FOLTRIN) 110-0.5 mg capsule, Take 1 capsule by mouth 2 (two) times daily., Disp: , Rfl:     lactase (LACTAID) 3,000 unit tablet, Take 1 tablet by mouth 3 (three) times daily with meals., Disp: , Rfl:     Lactobacillus acidophilus (PROBIOTIC ACIDOPHILUS ORAL), Take by mouth., Disp: , Rfl:     metronidazole 0.75% (METROCREAM) 0.75 % Crea, Apply topically 2 (two) times daily., Disp: , Rfl:     polyethylene glycol (GLYCOLAX) 17 gram/dose powder, Take 17 g by mouth as needed., Disp: , Rfl:     polysaccharide iron complex (NOVAFERRUM 50) 50 mg iron Cap, Take 1 capsule by mouth once daily., Disp: 90 capsule, Rfl: 2    diazePAM (VALIUM) 5 MG tablet, Take 1 tablet (5 mg total) by mouth as needed for Anxiety., Disp: 5 tablet, Rfl: 0    PMHx/PSHx Updates:  See patient's last visit with me on 1/9/2024  See H&P on 12/2/2022        Pathology:   Cancer Staging   No matching staging information was found for the patient.            Objective:     Vitals:  Blood pressure 110/65, pulse 80, temperature 98.2 °F (36.8 °C), resp. rate 16, height 5' 5" (1.651 m), weight 68 kg (149 lb 14.4 oz).    Physical Examination:   GEN: no apparent distress, comfortable; AAOx3  HEAD: atraumatic and normocephalic  EYES: no pallor, no icterus, PERRLA  ENT: OMM, no pharyngeal erythema, external ears WNL; no nasal discharge; no thrush  NECK: no masses, thyroid normal, trachea midline, no LAD/LN's, supple  CV: RRR with no murmur; normal pulse; normal S1 and S2; no pedal edema  CHEST: Normal respiratory effort; CTAB; normal breath sounds; no wheeze or crackles  ABDOM: nontender and nondistended; soft; normal bowel sounds; no rebound/guarding  MUSC/Skeletal: ROM normal; no crepitus; joints normal; no deformities or arthropathy  EXTREM: no clubbing, cyanosis, inflammation or swelling  SKIN: no rashes, lesions, ulcers, petechiae or subcutaneous nodules  : no koch  NEURO: grossly " intact; motor/sensory WNL; AAOx3; no tremors  PSYCH: normal mood, affect and behavior  LYMPH: normal cervical, supraclavicular, axillary and groin LN's            Labs:     Lab Results   Component Value Date    WBC 6.27 05/20/2024    HGB 12.9 05/20/2024    HCT 39.2 05/20/2024    MCV 88 05/20/2024     05/20/2024       CMP  Sodium   Date Value Ref Range Status   05/20/2024 135 (L) 136 - 145 mmol/L Final     Potassium   Date Value Ref Range Status   05/20/2024 3.9 3.5 - 5.1 mmol/L Final     Chloride   Date Value Ref Range Status   05/20/2024 102 95 - 110 mmol/L Final     CO2   Date Value Ref Range Status   05/20/2024 26 23 - 29 mmol/L Final     Glucose   Date Value Ref Range Status   05/20/2024 82 70 - 110 mg/dL Final     BUN   Date Value Ref Range Status   05/20/2024 6 6 - 20 mg/dL Final     Creatinine   Date Value Ref Range Status   05/20/2024 0.6 0.5 - 1.4 mg/dL Final     Calcium   Date Value Ref Range Status   05/20/2024 9.1 8.7 - 10.5 mg/dL Final     Total Protein   Date Value Ref Range Status   05/20/2024 7.2 6.0 - 8.4 g/dL Final     Albumin   Date Value Ref Range Status   05/20/2024 4.2 3.5 - 5.2 g/dL Final     Total Bilirubin   Date Value Ref Range Status   05/20/2024 0.4 0.1 - 1.0 mg/dL Final     Comment:     For infants and newborns, interpretation of results should be based  on gestational age, weight and in agreement with clinical  observations.    Premature Infant recommended reference ranges:  Up to 24 hours.............<8.0 mg/dL  Up to 48 hours............<12.0 mg/dL  3-5 days..................<15.0 mg/dL  6-29 days.................<15.0 mg/dL       Alkaline Phosphatase   Date Value Ref Range Status   05/20/2024 68 55 - 135 U/L Final     AST   Date Value Ref Range Status   05/20/2024 14 10 - 40 U/L Final     ALT   Date Value Ref Range Status   05/20/2024 10 10 - 44 U/L Final     Anion Gap   Date Value Ref Range Status   05/20/2024 7 (L) 8 - 16 mmol/L Final     eGFR   Date Value Ref Range Status    05/20/2024 >60.0 >60 mL/min/1.73 m^2 Final         Lab Results   Component Value Date    IRON 111 05/20/2024    TRANSFERRIN 265 05/20/2024    TIBC 371 05/20/2024    FESATURATED 30 05/20/2024      Lab Results   Component Value Date    FERRITIN 8.0 (L) 05/20/2024    FERRITIN 8.0 (L) 05/20/2024              Hgb F                          0.0              %        MB     Reference Range: 0.0-2.0                                 Hgb A                          97.7             %        MB     Reference Range: 96.4-98.8                               Hgb A2                         2.3              %        MB     Reference Range: 1.8-3.2                                 Hgb S                          0.0              %        MB     Reference Range: 0.0                                         Alpha-Thalassemia              Comment:                  TG     Test: Alpha-Thalassemia, DNA Analysis   Result:   Negative, alpha alpha/alpha alpha     Radiology/Diagnostic Studies:  US BREAST LEFT LIMITED     CLINICAL HISTORY:  Unspecified lump in the left breast, upper outer quadrant     TECHNIQUE:  CMS MANDATED QUALITY DATA - MAMMOGRAM - 225     [facility] utilizes a reminder system to ensure that all patients receive reminder letters and/or direct phone calls for appointments. This includes reminders for routine screening mammograms, diagnostic mammograms, and other breast imaging interventions when appropriate. This patient will be placed in the appropriate reminder system.     The interpretation was assisted by Computer Aided Detection with [employer].     COMPARISON:  None     FINDINGS:  Grayscale evaluation of the left breast 3-4 o'clock area of interest, 6 cm from the nipple was performed.  Of note, patient reports the palpable abnormality is intermittent, and does not feel it today.  Normal fibroglandular elements are evident in this region.  No suspicious cystic or solid mass.     Impression:      Negative targeted left breast ultrasound.     Clinical follow-up and screening mammography at age 40 recommended.     BI-RADS CATEGORY 1: NEGATIVE.    I have reviewed all available lab results and radiology reports.    Assessment/Plan:   (1) 23 y.o. female with diagnosis of iron deficiency anemia. She has been on oral iron for three months with only minimal improvements.   - orders placed for IV iron x 4  - recheck labs after 4 weeks and then follow up with Dr. Issa   - Thalessemia and hemoglobinopathy     1/11/2023:  - she is getting 4th IV iron today  - check labs monthly - will have done next week    3/8/2023:  - s/p last IV iron was last week  - latest labs are good with hgb WNL; iron panel now WNL    6/15/2023:  - hgb, iron and ferritin remain wnl  - check labs again in 3 month  - encourage the use of oral iron    9/7/2023:  - latest hgb was 13.4  - encouraged her to resume oral iron  - latest iron 52 and ferritin 24    1/9/2024:  - latest CBC is adequate, with no anemia  - last iron infusion was in March 2023  - Iron sat 15%; ferritin 7.5  - discussed resumption of oral iron    5/21/2024:  - latest hgb WNL  - continued on oral iron but forgets top take it intermittently  - iron panel adequate except for low ferritin       (2) Gastritis   - follow up with GI   - takes a probiotic daily      (3) Autism/OCD            VISIT DIAGNOSES:      Iron deficiency anemia, unspecified iron deficiency anemia type            PLAN:  1. Check labs every 3 months and resume IV iron as needed; encouraged her to take the oral iron  2. Encouraged use of oral iron  3.  F/u with GYN, PCP  4. Follow up with GI as needed         RTC in  3-4 months      Fax note to  Cristy Valentin FNP-C, and Thea    Discussion:     COVID-19 Discussion:    I had long discussion with patient and any applicable family about the COVID-19 coronavirus epidemic and the recommended precautions with regard to cancer and/or hematology patients. I have  re-iterated the CDC recommendations for adequate hand washing, use of hand -like products, and coughing into elbow, etc. In addition, especially for our patients who are on chemotherapy and/or our otherwise immunocompromised patients, I have recommended avoidance of crowds, including movie theaters, restaurants, churches, etc. I have recommended avoidance of any sick or symptomatic family members and/or friends. I have also recommended avoidance of any raw and unwashed food products, and general avoidance of food items that have not been prepared by themselves. The patient has been asked to call us immediately with any symptom developments, issues, questions or other general concerns.       Iron Infusion Therapy Discussion:     I provided literature/learning materials on the particular IV iron regimen and discussed the potential side-effect profiles of the drug(s). I discussed the importance of compliance with obtaining and monitoring requested lab work, and went over the potential risk for the development of anaphylactic shock, bronchospasm, dysrhythmia, liver and/or kidney damage, and respiratory/cardiovascular arrest and/or failure. I discussed the potential risks for development of alopecia, fevers, itching, chills and/or rigors, cold sensory issues, ringing in ears, vertigo and neuropathy, all of which are usually acute but sometimes could end up being chronic and life-long. I discussed the risks of hand-foot syndrome and rashes, and development of other autoimmune mediated processes such as pneumonitis and colitis which could be life threatening.     The patient's consent has been obtained to proceed with the IV iron therapy.The patient will be referred to [facility] for training and education on IV iron therapy, use of antiemetics and/or anti-diarrheals, use of NSAID's, potential IV iron therapy side-effects, and any specific recommendations and precautions with the  particular IV iron agents.      I answered all of the patient's (and family's, if applicable) questions to the best of my ability and to their complete satisfaction. The patient acknowledged full understanding of the risks, recommendations and plan(s).     I spent over 25 mins of time with the patient. Reviewed results of the recently ordered labs, tests and studies; made directives with regards to the results. Over half of this time was spent couseling and coordinating care.    I have explained all of the above in detail and the patient understands all of the current recommendation(s). I have answered all of their questions to the best of my ability and to their complete satisfaction.   The patient is to continue with the current management plan.            Electronically signed by Gustavo Issa MD

## 2024-05-21 ENCOUNTER — OFFICE VISIT (OUTPATIENT)
Facility: CLINIC | Age: 24
End: 2024-05-21
Payer: MEDICAID

## 2024-05-21 VITALS
TEMPERATURE: 98 F | HEART RATE: 80 BPM | DIASTOLIC BLOOD PRESSURE: 65 MMHG | HEIGHT: 65 IN | RESPIRATION RATE: 16 BRPM | WEIGHT: 149.88 LBS | BODY MASS INDEX: 24.97 KG/M2 | SYSTOLIC BLOOD PRESSURE: 110 MMHG

## 2024-05-21 DIAGNOSIS — D50.9 IRON DEFICIENCY ANEMIA, UNSPECIFIED IRON DEFICIENCY ANEMIA TYPE: Primary | ICD-10-CM

## 2024-05-21 PROCEDURE — 3074F SYST BP LT 130 MM HG: CPT | Mod: CPTII,,, | Performed by: INTERNAL MEDICINE

## 2024-05-21 PROCEDURE — 1159F MED LIST DOCD IN RCRD: CPT | Mod: CPTII,,, | Performed by: INTERNAL MEDICINE

## 2024-05-21 PROCEDURE — 3008F BODY MASS INDEX DOCD: CPT | Mod: CPTII,,, | Performed by: INTERNAL MEDICINE

## 2024-05-21 PROCEDURE — G2211 COMPLEX E/M VISIT ADD ON: HCPCS | Mod: S$PBB,,, | Performed by: INTERNAL MEDICINE

## 2024-05-21 PROCEDURE — 1160F RVW MEDS BY RX/DR IN RCRD: CPT | Mod: CPTII,,, | Performed by: INTERNAL MEDICINE

## 2024-05-21 PROCEDURE — 3078F DIAST BP <80 MM HG: CPT | Mod: CPTII,,, | Performed by: INTERNAL MEDICINE

## 2024-05-21 PROCEDURE — 99213 OFFICE O/P EST LOW 20 MIN: CPT | Mod: S$PBB,,, | Performed by: INTERNAL MEDICINE

## 2024-05-21 PROCEDURE — 99999 PR PBB SHADOW E&M-EST. PATIENT-LVL III: CPT | Mod: PBBFAC,,, | Performed by: INTERNAL MEDICINE

## 2024-05-21 PROCEDURE — 99213 OFFICE O/P EST LOW 20 MIN: CPT | Mod: PBBFAC,PN | Performed by: INTERNAL MEDICINE

## 2024-07-15 ENCOUNTER — OFFICE VISIT (OUTPATIENT)
Dept: FAMILY MEDICINE | Facility: CLINIC | Age: 24
End: 2024-07-15
Payer: MEDICAID

## 2024-07-15 ENCOUNTER — LAB VISIT (OUTPATIENT)
Dept: LAB | Facility: HOSPITAL | Age: 24
End: 2024-07-15
Attending: NURSE PRACTITIONER
Payer: MEDICAID

## 2024-07-15 VITALS
SYSTOLIC BLOOD PRESSURE: 105 MMHG | HEIGHT: 65 IN | DIASTOLIC BLOOD PRESSURE: 62 MMHG | OXYGEN SATURATION: 99 % | WEIGHT: 155.13 LBS | BODY MASS INDEX: 25.85 KG/M2

## 2024-07-15 DIAGNOSIS — Z13.220 LIPID SCREENING: ICD-10-CM

## 2024-07-15 DIAGNOSIS — Z11.59 ENCOUNTER FOR HEPATITIS C SCREENING TEST FOR LOW RISK PATIENT: ICD-10-CM

## 2024-07-15 DIAGNOSIS — Z11.4 ENCOUNTER FOR SCREENING FOR HIV: ICD-10-CM

## 2024-07-15 DIAGNOSIS — Z00.00 ANNUAL PHYSICAL EXAM: Primary | ICD-10-CM

## 2024-07-15 DIAGNOSIS — Z01.419 ENCOUNTER FOR WELL WOMAN EXAM WITH ROUTINE GYNECOLOGICAL EXAM: ICD-10-CM

## 2024-07-15 LAB
CHOLEST SERPL-MCNC: 149 MG/DL (ref 120–199)
CHOLEST/HDLC SERPL: 2.2 {RATIO} (ref 2–5)
HCV AB SERPL QL IA: NORMAL
HDLC SERPL-MCNC: 68 MG/DL (ref 40–75)
HDLC SERPL: 45.6 % (ref 20–50)
HIV 1+2 AB+HIV1 P24 AG SERPL QL IA: NORMAL
LDLC SERPL CALC-MCNC: 71.4 MG/DL (ref 63–159)
NONHDLC SERPL-MCNC: 81 MG/DL
TRIGL SERPL-MCNC: 48 MG/DL (ref 30–150)

## 2024-07-15 PROCEDURE — 1160F RVW MEDS BY RX/DR IN RCRD: CPT | Mod: CPTII,,, | Performed by: NURSE PRACTITIONER

## 2024-07-15 PROCEDURE — 3008F BODY MASS INDEX DOCD: CPT | Mod: CPTII,,, | Performed by: NURSE PRACTITIONER

## 2024-07-15 PROCEDURE — 99395 PREV VISIT EST AGE 18-39: CPT | Mod: S$PBB,,, | Performed by: NURSE PRACTITIONER

## 2024-07-15 PROCEDURE — 3078F DIAST BP <80 MM HG: CPT | Mod: CPTII,,, | Performed by: NURSE PRACTITIONER

## 2024-07-15 PROCEDURE — 99999 PR PBB SHADOW E&M-EST. PATIENT-LVL IV: CPT | Mod: PBBFAC,,, | Performed by: NURSE PRACTITIONER

## 2024-07-15 PROCEDURE — 99214 OFFICE O/P EST MOD 30 MIN: CPT | Mod: PBBFAC,PN | Performed by: NURSE PRACTITIONER

## 2024-07-15 PROCEDURE — 80061 LIPID PANEL: CPT | Performed by: NURSE PRACTITIONER

## 2024-07-15 PROCEDURE — 87389 HIV-1 AG W/HIV-1&-2 AB AG IA: CPT | Performed by: NURSE PRACTITIONER

## 2024-07-15 PROCEDURE — 1159F MED LIST DOCD IN RCRD: CPT | Mod: CPTII,,, | Performed by: NURSE PRACTITIONER

## 2024-07-15 PROCEDURE — 86803 HEPATITIS C AB TEST: CPT | Performed by: NURSE PRACTITIONER

## 2024-07-15 PROCEDURE — 3074F SYST BP LT 130 MM HG: CPT | Mod: CPTII,,, | Performed by: NURSE PRACTITIONER

## 2024-07-15 PROCEDURE — 36415 COLL VENOUS BLD VENIPUNCTURE: CPT | Performed by: NURSE PRACTITIONER

## 2024-07-15 NOTE — PROGRESS NOTES
HPI: Alanna Hammer  is a 23 y.o. female who presents for annual physical .  No complaints at this time.     Review of Systems   Constitutional:  Negative for activity change, appetite change and fever.   HENT:  Negative for congestion, ear discharge, ear pain, sore throat, trouble swallowing and voice change.    Eyes:  Negative for photophobia, pain, discharge and visual disturbance.   Respiratory:  Negative for cough, chest tightness and shortness of breath.    Cardiovascular:  Negative for chest pain and palpitations.   Gastrointestinal:  Negative for abdominal pain, nausea and vomiting.   Endocrine: Negative for cold intolerance and heat intolerance.   Genitourinary:  Negative for difficulty urinating and dysuria.   Musculoskeletal:  Negative for arthralgias and gait problem.   Skin:  Negative for rash.   Allergic/Immunologic: Negative for immunocompromised state.   Neurological:  Negative for speech difficulty and headaches.   Psychiatric/Behavioral:  Negative for confusion, self-injury and suicidal ideas.      Review of patient's allergies indicates:   Allergen Reactions    Egg derived      Egg white    Lactose      Past Medical History:   Diagnosis Date    Acne 11/20/2020    Autism 8/21/2020    Chronic constipation 8/22/2020    Gastroesophageal reflux disease without esophagitis 8/21/2020    History of speech therapy      No past surgical history on file.  Family History   Problem Relation Name Age of Onset    No Known Problems Mother      No Known Problems Father      Cataracts Maternal Grandfather      Glaucoma Neg Hx      Macular degeneration Neg Hx       Social History     Tobacco Use    Smoking status: Never    Smokeless tobacco: Never   Substance Use Topics    Alcohol use: Never    Drug use: Never      Health Maintenance Topics with due status: Not Due       Topic Last Completion Date    TETANUS VACCINE 07/11/2023    Influenza Vaccine 04/01/2024     Immunization History   Administered Date(s)  Administered    COVID-19, MRNA, LN-S, PF (Pfizer) (Purple Cap) 05/07/2021, 05/28/2021, 12/29/2021    COVID-19, mRNA, LNP-S, bivalent booster, PF (PFIZER OMICRON) 02/24/2023    DTP 2000, 01/29/2001    DTaP (5 Pertussis Antigens) 03/27/2001, 01/03/2002, 10/19/2004    HIB 2000, 01/29/2001, 03/27/2001, 01/03/2002    Hepatitis A, Pediatric/Adolescent, 2 Dose 01/15/2018, 09/28/2018    Hepatitis B 03/27/2001    Hepatitis B, Pediatric/Adolescent 2000, 2000    IPV 09/28/2001, 10/19/2004    Influenza - Quadrivalent - PF *Preferred* (6 months and older) 01/15/2018, 11/21/2018, 11/07/2019, 10/15/2020, 10/23/2021, 11/04/2022    Influenza Split 11/20/2006, 11/19/2007, 12/29/2008, 12/21/2009    MMR 09/28/2001, 10/19/2004    Meningococcal Conjugate (MCV4O) 2 Vial (2mo-55yr) 09/27/2011    Meningococcal Conjugate (MCV4P) 01/15/2018    OPV 2000, 01/29/2001    Pneumococcal Conjugate - 7 Valent 06/22/2001, 01/03/2002, 10/19/2004    Tdap 09/27/2011, 07/11/2023    Varicella 09/28/2001, 12/29/2008     OBJECTIVE:      Vitals:    07/15/24 0814   BP: 105/62     Physical Exam  Vitals and nursing note reviewed.   Constitutional:       General: She is not in acute distress.     Appearance: Normal appearance. She is well-developed.   HENT:      Head: Normocephalic and atraumatic.      Right Ear: Tympanic membrane normal.      Left Ear: Tympanic membrane normal.      Nose: Nose normal.      Mouth/Throat:      Mouth: Mucous membranes are moist.   Eyes:      General: Lids are normal. Lids are everted, no foreign bodies appreciated.      Conjunctiva/sclera: Conjunctivae normal.      Pupils: Pupils are equal, round, and reactive to light.      Right eye: Pupil is round and reactive.      Left eye: Pupil is round and reactive.   Neck:      Trachea: Trachea normal.   Cardiovascular:      Rate and Rhythm: Normal rate and regular rhythm.      Pulses: Normal pulses.      Heart sounds: Normal heart sounds, S1 normal and S2  normal.   Pulmonary:      Effort: Pulmonary effort is normal.      Breath sounds: Normal breath sounds.   Abdominal:      General: Abdomen is flat. Bowel sounds are normal.      Palpations: Abdomen is soft. Abdomen is not rigid.      Tenderness: There is no guarding.   Musculoskeletal:         General: Normal range of motion.      Cervical back: Normal range of motion and neck supple. No muscular tenderness.   Lymphadenopathy:      Cervical: No cervical adenopathy.   Skin:     General: Skin is warm and dry.      Capillary Refill: Capillary refill takes less than 2 seconds.   Neurological:      General: No focal deficit present.      Mental Status: She is alert and oriented to person, place, and time.   Psychiatric:         Mood and Affect: Mood normal.         Behavior: Behavior normal. Behavior is cooperative.         Thought Content: Thought content normal.         Judgment: Judgment normal.      Comments: Autism        Assessment:       1. Annual physical exam    2. Encounter for screening for HIV    3. Encounter for hepatitis C screening test for low risk patient    4. Encounter for well woman exam with routine gynecological exam    5. Lipid screening        Plan:       Annual physical exam    Encounter for screening for HIV  -     HIV 1/2 Ag/Ab (4th Gen); Future; Expected date: 07/15/2024    Encounter for hepatitis C screening test for low risk patient  -     Hepatitis C Antibody; Future; Expected date: 07/15/2024    Encounter for well woman exam with routine gynecological exam    Lipid screening  -     Lipid Panel; Future; Expected date: 07/15/2024        Patient counseled on age appropriate medical preventative services, age appropriate cancer screenings, nutrition, healthy diet, consistent exercise regimen and maintaining an active lifestyle.      Counseled on age appropriate vaccines and discussed upcoming health care needs based on age/gender.  Spent time with patient counseling on need for a good  patient/doctor relationship moving forward.  Discussed use of common OTC medications and supplements.  Discussed common dietary aids and use of caffeine and the need for good sleep hygiene and stress management.    Follow up in about 1 year (around 7/15/2025) for Annual Wellness.      7/15/2024 ANETA Malagon, FNP-C

## 2024-08-13 ENCOUNTER — TELEPHONE (OUTPATIENT)
Dept: HEMATOLOGY/ONCOLOGY | Facility: CLINIC | Age: 24
End: 2024-08-13
Payer: MEDICAID

## 2024-08-14 ENCOUNTER — LAB VISIT (OUTPATIENT)
Dept: LAB | Facility: HOSPITAL | Age: 24
End: 2024-08-14
Attending: INTERNAL MEDICINE
Payer: MEDICAID

## 2024-08-14 DIAGNOSIS — D50.9 IRON DEFICIENCY ANEMIA, UNSPECIFIED IRON DEFICIENCY ANEMIA TYPE: ICD-10-CM

## 2024-08-14 LAB
ALBUMIN SERPL BCP-MCNC: 4.2 G/DL (ref 3.5–5.2)
ALP SERPL-CCNC: 59 U/L (ref 55–135)
ALT SERPL W/O P-5'-P-CCNC: 20 U/L (ref 10–44)
ANION GAP SERPL CALC-SCNC: 4 MMOL/L (ref 8–16)
AST SERPL-CCNC: 19 U/L (ref 10–40)
BASOPHILS # BLD AUTO: 0.04 K/UL (ref 0–0.2)
BASOPHILS NFR BLD: 0.8 % (ref 0–1.9)
BILIRUB SERPL-MCNC: 0.6 MG/DL (ref 0.1–1)
BUN SERPL-MCNC: 10 MG/DL (ref 6–20)
CALCIUM SERPL-MCNC: 9.5 MG/DL (ref 8.7–10.5)
CHLORIDE SERPL-SCNC: 105 MMOL/L (ref 95–110)
CO2 SERPL-SCNC: 29 MMOL/L (ref 23–29)
CREAT SERPL-MCNC: 0.6 MG/DL (ref 0.5–1.4)
DIFFERENTIAL METHOD BLD: ABNORMAL
EOSINOPHIL # BLD AUTO: 0.8 K/UL (ref 0–0.5)
EOSINOPHIL NFR BLD: 15.9 % (ref 0–8)
ERYTHROCYTE [DISTWIDTH] IN BLOOD BY AUTOMATED COUNT: 13.4 % (ref 11.5–14.5)
EST. GFR  (NO RACE VARIABLE): >60 ML/MIN/1.73 M^2
FERRITIN SERPL-MCNC: 11.7 NG/ML (ref 20–300)
GLUCOSE SERPL-MCNC: 85 MG/DL (ref 70–110)
HCT VFR BLD AUTO: 38.7 % (ref 37–48.5)
HGB BLD-MCNC: 12.7 G/DL (ref 12–16)
IMM GRANULOCYTES # BLD AUTO: 0.01 K/UL (ref 0–0.04)
IMM GRANULOCYTES NFR BLD AUTO: 0.2 % (ref 0–0.5)
IRON SERPL-MCNC: 137 UG/DL (ref 30–160)
LYMPHOCYTES # BLD AUTO: 2.2 K/UL (ref 1–4.8)
LYMPHOCYTES NFR BLD: 42.7 % (ref 18–48)
MCH RBC QN AUTO: 28.2 PG (ref 27–31)
MCHC RBC AUTO-ENTMCNC: 32.8 G/DL (ref 32–36)
MCV RBC AUTO: 86 FL (ref 82–98)
MONOCYTES # BLD AUTO: 0.3 K/UL (ref 0.3–1)
MONOCYTES NFR BLD: 6.3 % (ref 4–15)
NEUTROPHILS # BLD AUTO: 1.8 K/UL (ref 1.8–7.7)
NEUTROPHILS NFR BLD: 34.1 % (ref 38–73)
NRBC BLD-RTO: 0 /100 WBC
PLATELET # BLD AUTO: 340 K/UL (ref 150–450)
PMV BLD AUTO: 10.3 FL (ref 9.2–12.9)
POTASSIUM SERPL-SCNC: 4.1 MMOL/L (ref 3.5–5.1)
PROT SERPL-MCNC: 7.2 G/DL (ref 6–8.4)
RBC # BLD AUTO: 4.51 M/UL (ref 4–5.4)
SATURATED IRON: 38 % (ref 20–50)
SODIUM SERPL-SCNC: 138 MMOL/L (ref 136–145)
TOTAL IRON BINDING CAPACITY: 364 UG/DL (ref 250–450)
TRANSFERRIN SERPL-MCNC: 260 MG/DL (ref 200–375)
WBC # BLD AUTO: 5.11 K/UL (ref 3.9–12.7)

## 2024-08-14 PROCEDURE — 83540 ASSAY OF IRON: CPT | Performed by: INTERNAL MEDICINE

## 2024-08-14 PROCEDURE — 80053 COMPREHEN METABOLIC PANEL: CPT | Performed by: INTERNAL MEDICINE

## 2024-08-14 PROCEDURE — 85025 COMPLETE CBC W/AUTO DIFF WBC: CPT | Performed by: INTERNAL MEDICINE

## 2024-08-14 PROCEDURE — 82728 ASSAY OF FERRITIN: CPT | Performed by: INTERNAL MEDICINE

## 2024-08-14 PROCEDURE — 36415 COLL VENOUS BLD VENIPUNCTURE: CPT | Performed by: INTERNAL MEDICINE

## 2024-08-20 ENCOUNTER — OFFICE VISIT (OUTPATIENT)
Facility: CLINIC | Age: 24
End: 2024-08-20
Payer: MEDICAID

## 2024-08-20 VITALS
RESPIRATION RATE: 16 BRPM | BODY MASS INDEX: 27.16 KG/M2 | WEIGHT: 163.19 LBS | SYSTOLIC BLOOD PRESSURE: 113 MMHG | TEMPERATURE: 98 F | DIASTOLIC BLOOD PRESSURE: 71 MMHG | HEART RATE: 80 BPM

## 2024-08-20 DIAGNOSIS — D50.9 IRON DEFICIENCY ANEMIA, UNSPECIFIED IRON DEFICIENCY ANEMIA TYPE: Primary | ICD-10-CM

## 2024-08-20 PROCEDURE — 3008F BODY MASS INDEX DOCD: CPT | Mod: CPTII,,, | Performed by: INTERNAL MEDICINE

## 2024-08-20 PROCEDURE — 1159F MED LIST DOCD IN RCRD: CPT | Mod: CPTII,,, | Performed by: INTERNAL MEDICINE

## 2024-08-20 PROCEDURE — G2211 COMPLEX E/M VISIT ADD ON: HCPCS | Mod: S$PBB,,, | Performed by: INTERNAL MEDICINE

## 2024-08-20 PROCEDURE — 99213 OFFICE O/P EST LOW 20 MIN: CPT | Mod: PBBFAC,PN | Performed by: INTERNAL MEDICINE

## 2024-08-20 PROCEDURE — 3078F DIAST BP <80 MM HG: CPT | Mod: CPTII,,, | Performed by: INTERNAL MEDICINE

## 2024-08-20 PROCEDURE — 3074F SYST BP LT 130 MM HG: CPT | Mod: CPTII,,, | Performed by: INTERNAL MEDICINE

## 2024-08-20 PROCEDURE — 99999 PR PBB SHADOW E&M-EST. PATIENT-LVL III: CPT | Mod: PBBFAC,,, | Performed by: INTERNAL MEDICINE

## 2024-08-20 PROCEDURE — 1160F RVW MEDS BY RX/DR IN RCRD: CPT | Mod: CPTII,,, | Performed by: INTERNAL MEDICINE

## 2024-08-20 PROCEDURE — 99213 OFFICE O/P EST LOW 20 MIN: CPT | Mod: S$PBB,,, | Performed by: INTERNAL MEDICINE

## 2024-08-20 NOTE — PROGRESS NOTES
"[facility] Hematology/Oncology  PROGRESS NOTE - Follow-up Visit      Subjective:       Patient ID:   NAME: Alanna Hammer : 2000     23 y.o. female    Referring Doc: Danielle Angel MD  Other Physicians: Dr. Costa, Dr. Conteh, Dr. Diane,             Chief Complaint: Iron Deficiency Anemia  f/u     History of Present Illness:     Patient returns today for a regularly scheduled follow-up visit.  The patient is here today to go over the results of the recently ordered labs, tests and studies. She is here with her mom today.       She is doing ok with no new issues    She states she has been feeling "ok';       She last saw Cristy Valentin NP in 2024     No CP, SOB, HA's or N/V              ROS:   GEN: normal without any fever, night sweats or weight loss;    HEENT: normal with no HA's, sore throat, stiff neck, changes in vision  CV: normal with no CP, SOB, PND, DAVIS or orthopnea  PULM: normal with no SOB, cough, hemoptysis, sputum or pleuritic pain  GI: normal with no abdominal pain, nausea, vomiting, constipation, diarrhea, melanotic stools, BRBPR, or hematemesis  : normal with no hematuria, dysuria  BREAST: normal with no mass, discharge, pain  SKIN: normal with no rash, erythema, bruising, or swelling    Pain Scale: 0     Allergies:  Review of patient's allergies indicates:   Allergen Reactions    Egg derived      Egg white    Lactose        Medications:    Current Outpatient Medications:     acetaminophen (TYLENOL) 80 MG Chew, Take by mouth., Disp: , Rfl:     ammonium lactate 12 % Crea, Apply topically 2 (two) times daily., Disp: , Rfl:     diphenhydrAMINE (BENYLIN) 12.5 mg/5 mL liquid, Take by mouth 4 (four) times daily as needed for Allergies., Disp: , Rfl:     ibuprofen (ADVIL,MOTRIN) 100 mg/5 mL suspension, Take 15 mLs by mouth every 6 (six) hours as needed for Temperature greater than., Disp: , Rfl:     iron fum-B12-IF-C-folic acid (FOLTRIN) 110-0.5 mg capsule, Take 1 capsule by " mouth 2 (two) times daily., Disp: , Rfl:     lactase (LACTAID) 3,000 unit tablet, Take 1 tablet by mouth 3 (three) times daily with meals., Disp: , Rfl:     Lactobacillus acidophilus (PROBIOTIC ACIDOPHILUS ORAL), Take by mouth., Disp: , Rfl:     metronidazole 0.75% (METROCREAM) 0.75 % Crea, Apply topically 2 (two) times daily., Disp: , Rfl:     polyethylene glycol (GLYCOLAX) 17 gram/dose powder, Take 17 g by mouth as needed., Disp: , Rfl:     polysaccharide iron complex (NOVAFERRUM 50) 50 mg iron Cap, Take 1 capsule by mouth once daily., Disp: 90 capsule, Rfl: 2    diazePAM (VALIUM) 5 MG tablet, Take 1 tablet (5 mg total) by mouth as needed for Anxiety., Disp: 5 tablet, Rfl: 0    PMHx/PSHx Updates:  See patient's last visit with me on 5/21/2024  See H&P on 12/2/2022        Pathology:   Cancer Staging   No matching staging information was found for the patient.            Objective:     Vitals:  Blood pressure 113/71, pulse 80, temperature 98.2 °F (36.8 °C), resp. rate 16, weight 74 kg (163 lb 3.2 oz).    Physical Examination:   GEN: no apparent distress, comfortable; AAOx3  HEAD: atraumatic and normocephalic  EYES: no pallor, no icterus, PERRLA  ENT: OMM, no pharyngeal erythema, external ears WNL; no nasal discharge; no thrush  NECK: no masses, thyroid normal, trachea midline, no LAD/LN's, supple  CV: RRR with no murmur; normal pulse; normal S1 and S2; no pedal edema  CHEST: Normal respiratory effort; CTAB; normal breath sounds; no wheeze or crackles  ABDOM: nontender and nondistended; soft; normal bowel sounds; no rebound/guarding  MUSC/Skeletal: ROM normal; no crepitus; joints normal; no deformities or arthropathy  EXTREM: no clubbing, cyanosis, inflammation or swelling  SKIN: no rashes, lesions, ulcers, petechiae or subcutaneous nodules  : no koch  NEURO: grossly intact; motor/sensory WNL; AAOx3; no tremors  PSYCH: normal mood, affect and behavior  LYMPH: normal cervical, supraclavicular, axillary and groin  LN's            Labs:     Lab Results   Component Value Date    WBC 5.11 08/14/2024    HGB 12.7 08/14/2024    HCT 38.7 08/14/2024    MCV 86 08/14/2024     08/14/2024       CMP  Sodium   Date Value Ref Range Status   08/14/2024 138 136 - 145 mmol/L Final     Potassium   Date Value Ref Range Status   08/14/2024 4.1 3.5 - 5.1 mmol/L Final     Chloride   Date Value Ref Range Status   08/14/2024 105 95 - 110 mmol/L Final     CO2   Date Value Ref Range Status   08/14/2024 29 23 - 29 mmol/L Final     Glucose   Date Value Ref Range Status   08/14/2024 85 70 - 110 mg/dL Final     BUN   Date Value Ref Range Status   08/14/2024 10 6 - 20 mg/dL Final     Creatinine   Date Value Ref Range Status   08/14/2024 0.6 0.5 - 1.4 mg/dL Final     Calcium   Date Value Ref Range Status   08/14/2024 9.5 8.7 - 10.5 mg/dL Final     Total Protein   Date Value Ref Range Status   08/14/2024 7.2 6.0 - 8.4 g/dL Final     Albumin   Date Value Ref Range Status   08/14/2024 4.2 3.5 - 5.2 g/dL Final     Total Bilirubin   Date Value Ref Range Status   08/14/2024 0.6 0.1 - 1.0 mg/dL Final     Comment:     For infants and newborns, interpretation of results should be based  on gestational age, weight and in agreement with clinical  observations.    Premature Infant recommended reference ranges:  Up to 24 hours.............<8.0 mg/dL  Up to 48 hours............<12.0 mg/dL  3-5 days..................<15.0 mg/dL  6-29 days.................<15.0 mg/dL       Alkaline Phosphatase   Date Value Ref Range Status   08/14/2024 59 55 - 135 U/L Final     AST   Date Value Ref Range Status   08/14/2024 19 10 - 40 U/L Final     ALT   Date Value Ref Range Status   08/14/2024 20 10 - 44 U/L Final     Anion Gap   Date Value Ref Range Status   08/14/2024 4 (L) 8 - 16 mmol/L Final     eGFR   Date Value Ref Range Status   08/14/2024 >60.0 >60 mL/min/1.73 m^2 Final         Lab Results   Component Value Date    IRON 137 08/14/2024    TRANSFERRIN 260 08/14/2024    TIBC  364 08/14/2024    FESATURATED 38 08/14/2024      Lab Results   Component Value Date    FERRITIN 11.7 (L) 08/14/2024              Hgb F                          0.0              %        MB     Reference Range: 0.0-2.0                                 Hgb A                          97.7             %        MB     Reference Range: 96.4-98.8                               Hgb A2                         2.3              %        MB     Reference Range: 1.8-3.2                                 Hgb S                          0.0              %        MB     Reference Range: 0.0                                         Alpha-Thalassemia              Comment:                  TG     Test: Alpha-Thalassemia, DNA Analysis   Result:   Negative, alpha alpha/alpha alpha     Radiology/Diagnostic Studies:  US BREAST LEFT LIMITED     CLINICAL HISTORY:  Unspecified lump in the left breast, upper outer quadrant     TECHNIQUE:  CMS MANDATED QUALITY DATA - MAMMOGRAM - 225     [facility] utilizes a reminder system to ensure that all patients receive reminder letters and/or direct phone calls for appointments. This includes reminders for routine screening mammograms, diagnostic mammograms, and other breast imaging interventions when appropriate. This patient will be placed in the appropriate reminder system.     The interpretation was assisted by Computer Aided Detection with [employer].     COMPARISON:  None     FINDINGS:  Grayscale evaluation of the left breast 3-4 o'clock area of interest, 6 cm from the nipple was performed.  Of note, patient reports the palpable abnormality is intermittent, and does not feel it today.  Normal fibroglandular elements are evident in this region.  No suspicious cystic or solid mass.     Impression:     Negative targeted left breast ultrasound.     Clinical follow-up and screening mammography at age 40 recommended.     BI-RADS CATEGORY 1: NEGATIVE.    I have reviewed all available lab  results and radiology reports.    Assessment/Plan:   (1) 23 y.o. female with diagnosis of iron deficiency anemia. She has been on oral iron for three months with only minimal improvements.   - orders placed for IV iron x 4  - recheck labs after 4 weeks and then follow up with Dr. Issa   - Thalessemia and hemoglobinopathy     1/11/2023:  - she is getting 4th IV iron today  - check labs monthly - will have done next week    3/8/2023:  - s/p last IV iron was last week  - latest labs are good with hgb WNL; iron panel now WNL    6/15/2023:  - hgb, iron and ferritin remain wnl  - check labs again in 3 month  - encourage the use of oral iron    9/7/2023:  - latest hgb was 13.4  - encouraged her to resume oral iron  - latest iron 52 and ferritin 24    1/9/2024:  - latest CBC is adequate, with no anemia  - last iron infusion was in March 2023  - Iron sat 15%; ferritin 7.5  - discussed resumption of oral iron    5/21/2024:  - latest hgb WNL  - continued on oral iron but forgets top take it intermittently  - iron panel adequate except for low ferritin    8/20/2024:  - latest CBC adequate  - normal hgb  - iron panel adequate  - continued on oral iron       (2) Gastritis   - follow up with GI   - takes a probiotic daily      (3) Autism/OCD            VISIT DIAGNOSES:      Iron deficiency anemia, unspecified iron deficiency anemia type            PLAN:  1. Check labs every 3 months and resume IV iron as needed; encouraged her to take the oral iron  2. Encouraged use of oral iron  3.  F/u with GYN, PCP  4. Follow up with GI as needed         RTC in 6  months      Fax note to  Cristy Valentin FNP-C, and Thea    Discussion:     COVID-19 Discussion:    I had long discussion with patient and any applicable family about the COVID-19 coronavirus epidemic and the recommended precautions with regard to cancer and/or hematology patients. I have re-iterated the CDC recommendations for adequate hand washing, use of hand -like  products, and coughing into elbow, etc. In addition, especially for our patients who are on chemotherapy and/or our otherwise immunocompromised patients, I have recommended avoidance of crowds, including movie theaters, restaurants, churches, etc. I have recommended avoidance of any sick or symptomatic family members and/or friends. I have also recommended avoidance of any raw and unwashed food products, and general avoidance of food items that have not been prepared by themselves. The patient has been asked to call us immediately with any symptom developments, issues, questions or other general concerns.       Iron Infusion Therapy Discussion:     I provided literature/learning materials on the particular IV iron regimen and discussed the potential side-effect profiles of the drug(s). I discussed the importance of compliance with obtaining and monitoring requested lab work, and went over the potential risk for the development of anaphylactic shock, bronchospasm, dysrhythmia, liver and/or kidney damage, and respiratory/cardiovascular arrest and/or failure. I discussed the potential risks for development of alopecia, fevers, itching, chills and/or rigors, cold sensory issues, ringing in ears, vertigo and neuropathy, all of which are usually acute but sometimes could end up being chronic and life-long. I discussed the risks of hand-foot syndrome and rashes, and development of other autoimmune mediated processes such as pneumonitis and colitis which could be life threatening.     The patient's consent has been obtained to proceed with the IV iron therapy.The patient will be referred to [facility] for training and education on IV iron therapy, use of antiemetics and/or anti-diarrheals, use of NSAID's, potential IV iron therapy side-effects, and any specific recommendations and precautions with the particular IV iron agents.      I answered all of the patient's (and family's, if applicable)  questions to the best of my ability and to their complete satisfaction. The patient acknowledged full understanding of the risks, recommendations and plan(s).     I spent over 25 mins of time with the patient. Reviewed results of the recently ordered labs, tests and studies; made directives with regards to the results. Over half of this time was spent couseling and coordinating care.    I have explained all of the above in detail and the patient understands all of the current recommendation(s). I have answered all of their questions to the best of my ability and to their complete satisfaction.   The patient is to continue with the current management plan.            Electronically signed by Gustavo Issa MD

## 2025-01-02 ENCOUNTER — PATIENT MESSAGE (OUTPATIENT)
Dept: ADMINISTRATIVE | Facility: HOSPITAL | Age: 25
End: 2025-01-02
Payer: MEDICAID

## 2025-01-08 ENCOUNTER — HOSPITAL ENCOUNTER (OUTPATIENT)
Facility: HOSPITAL | Age: 25
Discharge: HOME | DRG: 125 | End: 2025-01-11
Attending: EMERGENCY MEDICINE | Admitting: INTERNAL MEDICINE
Payer: MEDICAID

## 2025-01-08 ENCOUNTER — TELEPHONE (OUTPATIENT)
Dept: FAMILY MEDICINE | Facility: CLINIC | Age: 25
End: 2025-01-08
Payer: MEDICAID

## 2025-01-08 DIAGNOSIS — R07.9 CHEST PAIN: ICD-10-CM

## 2025-01-08 DIAGNOSIS — H00.036 EYELID ABSCESS, LEFT: ICD-10-CM

## 2025-01-08 LAB
ALBUMIN SERPL BCP-MCNC: 4.1 G/DL (ref 3.5–5.2)
ALP SERPL-CCNC: 77 U/L (ref 40–150)
ALT SERPL W/O P-5'-P-CCNC: 15 U/L (ref 10–44)
ANION GAP SERPL CALC-SCNC: 10 MMOL/L (ref 8–16)
AST SERPL-CCNC: 15 U/L (ref 10–40)
BASOPHILS # BLD AUTO: 0.06 K/UL (ref 0–0.2)
BASOPHILS NFR BLD: 0.8 % (ref 0–1.9)
BILIRUB SERPL-MCNC: 0.3 MG/DL (ref 0.1–1)
BUN SERPL-MCNC: 7 MG/DL (ref 6–20)
CALCIUM SERPL-MCNC: 9.3 MG/DL (ref 8.7–10.5)
CHLORIDE SERPL-SCNC: 106 MMOL/L (ref 95–110)
CO2 SERPL-SCNC: 20 MMOL/L (ref 23–29)
CREAT SERPL-MCNC: 0.8 MG/DL (ref 0.5–1.4)
DIFFERENTIAL METHOD BLD: ABNORMAL
EOSINOPHIL # BLD AUTO: 0.7 K/UL (ref 0–0.5)
EOSINOPHIL NFR BLD: 8.9 % (ref 0–8)
ERYTHROCYTE [DISTWIDTH] IN BLOOD BY AUTOMATED COUNT: 14.2 % (ref 11.5–14.5)
EST. GFR  (NO RACE VARIABLE): >60 ML/MIN/1.73 M^2
GLUCOSE SERPL-MCNC: 96 MG/DL (ref 70–110)
HCT VFR BLD AUTO: 38.5 % (ref 37–48.5)
HGB BLD-MCNC: 12.7 G/DL (ref 12–16)
IMM GRANULOCYTES # BLD AUTO: 0.03 K/UL (ref 0–0.04)
IMM GRANULOCYTES NFR BLD AUTO: 0.4 % (ref 0–0.5)
LYMPHOCYTES # BLD AUTO: 1.8 K/UL (ref 1–4.8)
LYMPHOCYTES NFR BLD: 22.8 % (ref 18–48)
MCH RBC QN AUTO: 28.6 PG (ref 27–31)
MCHC RBC AUTO-ENTMCNC: 33 G/DL (ref 32–36)
MCV RBC AUTO: 87 FL (ref 82–98)
MONOCYTES # BLD AUTO: 0.7 K/UL (ref 0.3–1)
MONOCYTES NFR BLD: 9 % (ref 4–15)
NEUTROPHILS # BLD AUTO: 4.6 K/UL (ref 1.8–7.7)
NEUTROPHILS NFR BLD: 58.1 % (ref 38–73)
NRBC BLD-RTO: 0 /100 WBC
PLATELET # BLD AUTO: 364 K/UL (ref 150–450)
PMV BLD AUTO: 10.2 FL (ref 9.2–12.9)
POTASSIUM SERPL-SCNC: 4 MMOL/L (ref 3.5–5.1)
PROT SERPL-MCNC: 7.7 G/DL (ref 6–8.4)
RBC # BLD AUTO: 4.44 M/UL (ref 4–5.4)
SODIUM SERPL-SCNC: 136 MMOL/L (ref 136–145)
WBC # BLD AUTO: 7.98 K/UL (ref 3.9–12.7)

## 2025-01-08 PROCEDURE — 63600175 PHARM REV CODE 636 W HCPCS: Performed by: EMERGENCY MEDICINE

## 2025-01-08 PROCEDURE — 36415 COLL VENOUS BLD VENIPUNCTURE: CPT | Performed by: EMERGENCY MEDICINE

## 2025-01-08 PROCEDURE — 25000003 PHARM REV CODE 250: Performed by: EMERGENCY MEDICINE

## 2025-01-08 PROCEDURE — 67700 BLEPHAROTOMY DRG ABSC EYELID: CPT | Mod: LT

## 2025-01-08 PROCEDURE — G0378 HOSPITAL OBSERVATION PER HR: HCPCS

## 2025-01-08 PROCEDURE — 80053 COMPREHEN METABOLIC PANEL: CPT | Performed by: EMERGENCY MEDICINE

## 2025-01-08 PROCEDURE — 85025 COMPLETE CBC W/AUTO DIFF WBC: CPT | Performed by: EMERGENCY MEDICINE

## 2025-01-08 PROCEDURE — 25500020 PHARM REV CODE 255

## 2025-01-08 PROCEDURE — 11000001 HC ACUTE MED/SURG PRIVATE ROOM

## 2025-01-08 PROCEDURE — 96365 THER/PROPH/DIAG IV INF INIT: CPT

## 2025-01-08 PROCEDURE — 99285 EMERGENCY DEPT VISIT HI MDM: CPT | Mod: 25

## 2025-01-08 RX ORDER — FAMOTIDINE 20 MG/1
20 TABLET, FILM COATED ORAL 2 TIMES DAILY
Status: DISCONTINUED | OUTPATIENT
Start: 2025-01-09 | End: 2025-01-11 | Stop reason: HOSPADM

## 2025-01-08 RX ORDER — ACETAMINOPHEN 325 MG/1
650 TABLET ORAL EVERY 4 HOURS PRN
Status: DISCONTINUED | OUTPATIENT
Start: 2025-01-08 | End: 2025-01-11 | Stop reason: HOSPADM

## 2025-01-08 RX ORDER — HYDROCODONE BITARTRATE AND ACETAMINOPHEN 5; 325 MG/1; MG/1
1 TABLET ORAL EVERY 6 HOURS PRN
Status: DISCONTINUED | OUTPATIENT
Start: 2025-01-09 | End: 2025-01-11 | Stop reason: HOSPADM

## 2025-01-08 RX ORDER — SODIUM,POTASSIUM PHOSPHATES 280-250MG
2 POWDER IN PACKET (EA) ORAL
Status: DISCONTINUED | OUTPATIENT
Start: 2025-01-08 | End: 2025-01-11 | Stop reason: HOSPADM

## 2025-01-08 RX ORDER — NALOXONE HCL 0.4 MG/ML
0.02 VIAL (ML) INJECTION
Status: DISCONTINUED | OUTPATIENT
Start: 2025-01-08 | End: 2025-01-11 | Stop reason: HOSPADM

## 2025-01-08 RX ORDER — LIDOCAINE HYDROCHLORIDE 10 MG/ML
10 INJECTION, SOLUTION EPIDURAL; INFILTRATION; INTRACAUDAL; PERINEURAL
Status: DISPENSED | OUTPATIENT
Start: 2025-01-08 | End: 2025-01-09

## 2025-01-08 RX ORDER — IBUPROFEN 200 MG
24 TABLET ORAL
Status: DISCONTINUED | OUTPATIENT
Start: 2025-01-08 | End: 2025-01-11 | Stop reason: HOSPADM

## 2025-01-08 RX ORDER — TALC
9 POWDER (GRAM) TOPICAL NIGHTLY PRN
Status: DISCONTINUED | OUTPATIENT
Start: 2025-01-08 | End: 2025-01-11 | Stop reason: HOSPADM

## 2025-01-08 RX ORDER — SODIUM CHLORIDE 0.9 % (FLUSH) 0.9 %
3 SYRINGE (ML) INJECTION EVERY 12 HOURS PRN
Status: DISCONTINUED | OUTPATIENT
Start: 2025-01-08 | End: 2025-01-11 | Stop reason: HOSPADM

## 2025-01-08 RX ORDER — POLYETHYLENE GLYCOL 3350 17 G/17G
17 POWDER, FOR SOLUTION ORAL DAILY
Status: DISCONTINUED | OUTPATIENT
Start: 2025-01-09 | End: 2025-01-11 | Stop reason: HOSPADM

## 2025-01-08 RX ORDER — IBUPROFEN 200 MG
16 TABLET ORAL
Status: DISCONTINUED | OUTPATIENT
Start: 2025-01-08 | End: 2025-01-11 | Stop reason: HOSPADM

## 2025-01-08 RX ORDER — ALUMINUM HYDROXIDE, MAGNESIUM HYDROXIDE, AND SIMETHICONE 1200; 120; 1200 MG/30ML; MG/30ML; MG/30ML
30 SUSPENSION ORAL 4 TIMES DAILY PRN
Status: DISCONTINUED | OUTPATIENT
Start: 2025-01-08 | End: 2025-01-11 | Stop reason: HOSPADM

## 2025-01-08 RX ORDER — ONDANSETRON HYDROCHLORIDE 2 MG/ML
8 INJECTION, SOLUTION INTRAVENOUS EVERY 6 HOURS PRN
Status: DISCONTINUED | OUTPATIENT
Start: 2025-01-08 | End: 2025-01-11 | Stop reason: HOSPADM

## 2025-01-08 RX ORDER — SIMETHICONE 80 MG
1 TABLET,CHEWABLE ORAL 4 TIMES DAILY PRN
Status: DISCONTINUED | OUTPATIENT
Start: 2025-01-08 | End: 2025-01-11 | Stop reason: HOSPADM

## 2025-01-08 RX ORDER — GLUCAGON 1 MG
1 KIT INJECTION
Status: DISCONTINUED | OUTPATIENT
Start: 2025-01-08 | End: 2025-01-11 | Stop reason: HOSPADM

## 2025-01-08 RX ORDER — ACETAMINOPHEN 325 MG/1
650 TABLET ORAL EVERY 8 HOURS PRN
Status: DISCONTINUED | OUTPATIENT
Start: 2025-01-08 | End: 2025-01-11 | Stop reason: HOSPADM

## 2025-01-08 RX ORDER — IBUPROFEN 400 MG/1
400 TABLET ORAL EVERY 6 HOURS PRN
Status: DISCONTINUED | OUTPATIENT
Start: 2025-01-09 | End: 2025-01-11 | Stop reason: HOSPADM

## 2025-01-08 RX ORDER — LANOLIN ALCOHOL/MO/W.PET/CERES
800 CREAM (GRAM) TOPICAL
Status: DISCONTINUED | OUTPATIENT
Start: 2025-01-08 | End: 2025-01-11 | Stop reason: HOSPADM

## 2025-01-08 RX ADMIN — IOHEXOL 100 ML: 350 INJECTION, SOLUTION INTRAVENOUS at 09:01

## 2025-01-08 RX ADMIN — PIPERACILLIN SODIUM AND TAZOBACTAM SODIUM 3.38 G: 3; .375 INJECTION, POWDER, FOR SOLUTION INTRAVENOUS at 09:01

## 2025-01-08 NOTE — TELEPHONE ENCOUNTER
----- Message from Demetria sent at 1/8/2025  2:04 PM CST -----  Patient needs to schedule an appointment for today if possible. Patient was released from [facility] 1/6. Patient is unable to find her admission date.   [phone]

## 2025-01-09 ENCOUNTER — PATIENT MESSAGE (OUTPATIENT)
Dept: CASE MANAGEMENT | Facility: HOSPITAL | Age: 25
End: 2025-01-09

## 2025-01-09 ENCOUNTER — TELEPHONE (OUTPATIENT)
Facility: CLINIC | Age: 25
End: 2025-01-09
Payer: MEDICAID

## 2025-01-09 LAB
ANION GAP SERPL CALC-SCNC: 8 MMOL/L (ref 8–16)
BASOPHILS # BLD AUTO: 0.08 K/UL (ref 0–0.2)
BASOPHILS NFR BLD: 1.2 % (ref 0–1.9)
BUN SERPL-MCNC: 6 MG/DL (ref 6–20)
CALCIUM SERPL-MCNC: 8.9 MG/DL (ref 8.7–10.5)
CHLORIDE SERPL-SCNC: 106 MMOL/L (ref 95–110)
CO2 SERPL-SCNC: 20 MMOL/L (ref 23–29)
CREAT SERPL-MCNC: 0.8 MG/DL (ref 0.5–1.4)
DIFFERENTIAL METHOD BLD: ABNORMAL
EOSINOPHIL # BLD AUTO: 0.7 K/UL (ref 0–0.5)
EOSINOPHIL NFR BLD: 10.7 % (ref 0–8)
ERYTHROCYTE [DISTWIDTH] IN BLOOD BY AUTOMATED COUNT: 14.3 % (ref 11.5–14.5)
EST. GFR  (NO RACE VARIABLE): >60 ML/MIN/1.73 M^2
GLUCOSE SERPL-MCNC: 92 MG/DL (ref 70–110)
HCT VFR BLD AUTO: 35.4 % (ref 37–48.5)
HGB BLD-MCNC: 11.7 G/DL (ref 12–16)
IMM GRANULOCYTES # BLD AUTO: 0.02 K/UL (ref 0–0.04)
IMM GRANULOCYTES NFR BLD AUTO: 0.3 % (ref 0–0.5)
LYMPHOCYTES # BLD AUTO: 1.9 K/UL (ref 1–4.8)
LYMPHOCYTES NFR BLD: 27.9 % (ref 18–48)
MAGNESIUM SERPL-MCNC: 1.9 MG/DL (ref 1.6–2.6)
MCH RBC QN AUTO: 28.1 PG (ref 27–31)
MCHC RBC AUTO-ENTMCNC: 33.1 G/DL (ref 32–36)
MCV RBC AUTO: 85 FL (ref 82–98)
MONOCYTES # BLD AUTO: 0.7 K/UL (ref 0.3–1)
MONOCYTES NFR BLD: 9.8 % (ref 4–15)
NEUTROPHILS # BLD AUTO: 3.5 K/UL (ref 1.8–7.7)
NEUTROPHILS NFR BLD: 50.1 % (ref 38–73)
NRBC BLD-RTO: 0 /100 WBC
PHOSPHATE SERPL-MCNC: 3.9 MG/DL (ref 2.7–4.5)
PLATELET # BLD AUTO: 305 K/UL (ref 150–450)
PMV BLD AUTO: 10 FL (ref 9.2–12.9)
POTASSIUM SERPL-SCNC: 3.7 MMOL/L (ref 3.5–5.1)
RBC # BLD AUTO: 4.17 M/UL (ref 4–5.4)
SODIUM SERPL-SCNC: 134 MMOL/L (ref 136–145)
WBC # BLD AUTO: 6.92 K/UL (ref 3.9–12.7)

## 2025-01-09 PROCEDURE — 85025 COMPLETE CBC W/AUTO DIFF WBC: CPT | Performed by: NURSE PRACTITIONER

## 2025-01-09 PROCEDURE — G0378 HOSPITAL OBSERVATION PER HR: HCPCS

## 2025-01-09 PROCEDURE — 11000001 HC ACUTE MED/SURG PRIVATE ROOM

## 2025-01-09 PROCEDURE — 80048 BASIC METABOLIC PNL TOTAL CA: CPT | Performed by: NURSE PRACTITIONER

## 2025-01-09 PROCEDURE — 25000003 PHARM REV CODE 250

## 2025-01-09 PROCEDURE — 089PXZZ DRAINAGE OF LEFT UPPER EYELID, EXTERNAL APPROACH: ICD-10-PCS | Performed by: EMERGENCY MEDICINE

## 2025-01-09 PROCEDURE — 83735 ASSAY OF MAGNESIUM: CPT | Performed by: NURSE PRACTITIONER

## 2025-01-09 PROCEDURE — 96366 THER/PROPH/DIAG IV INF ADDON: CPT

## 2025-01-09 PROCEDURE — 87070 CULTURE OTHR SPECIMN AEROBIC: CPT | Performed by: EMERGENCY MEDICINE

## 2025-01-09 PROCEDURE — 87077 CULTURE AEROBIC IDENTIFY: CPT | Performed by: INTERNAL MEDICINE

## 2025-01-09 PROCEDURE — 36415 COLL VENOUS BLD VENIPUNCTURE: CPT | Performed by: NURSE PRACTITIONER

## 2025-01-09 PROCEDURE — 25000003 PHARM REV CODE 250: Performed by: NURSE PRACTITIONER

## 2025-01-09 PROCEDURE — 87147 CULTURE TYPE IMMUNOLOGIC: CPT | Mod: 59 | Performed by: INTERNAL MEDICINE

## 2025-01-09 PROCEDURE — 87070 CULTURE OTHR SPECIMN AEROBIC: CPT | Mod: 59 | Performed by: INTERNAL MEDICINE

## 2025-01-09 PROCEDURE — 63600175 PHARM REV CODE 636 W HCPCS: Performed by: NURSE PRACTITIONER

## 2025-01-09 PROCEDURE — 84100 ASSAY OF PHOSPHORUS: CPT | Performed by: NURSE PRACTITIONER

## 2025-01-09 PROCEDURE — 87186 SC STD MICRODIL/AGAR DIL: CPT | Performed by: INTERNAL MEDICINE

## 2025-01-09 PROCEDURE — 99900035 HC TECH TIME PER 15 MIN (STAT)

## 2025-01-09 RX ORDER — MUPIROCIN 20 MG/G
1 OINTMENT TOPICAL
Status: COMPLETED | OUTPATIENT
Start: 2025-01-09 | End: 2025-01-09

## 2025-01-09 RX ADMIN — PIPERACILLIN SODIUM AND TAZOBACTAM SODIUM 4.5 G: 4; .5 INJECTION, POWDER, LYOPHILIZED, FOR SOLUTION INTRAVENOUS at 03:01

## 2025-01-09 RX ADMIN — FAMOTIDINE 20 MG: 20 TABLET, FILM COATED ORAL at 08:01

## 2025-01-09 RX ADMIN — MUPIROCIN 1 TUBE: 20 OINTMENT TOPICAL at 01:01

## 2025-01-09 RX ADMIN — PIPERACILLIN SODIUM AND TAZOBACTAM SODIUM 4.5 G: 4; .5 INJECTION, POWDER, LYOPHILIZED, FOR SOLUTION INTRAVENOUS at 09:01

## 2025-01-09 RX ADMIN — POLYETHYLENE GLYCOL (3350) 17 G: 17 POWDER, FOR SOLUTION ORAL at 08:01

## 2025-01-09 RX ADMIN — FAMOTIDINE 20 MG: 20 TABLET, FILM COATED ORAL at 09:01

## 2025-01-09 RX ADMIN — PIPERACILLIN SODIUM AND TAZOBACTAM SODIUM 4.5 G: 4; .5 INJECTION, POWDER, LYOPHILIZED, FOR SOLUTION INTRAVENOUS at 08:01

## 2025-01-09 NOTE — ED PROVIDER NOTES
Encounter Date: 1/8/2025       History     Chief Complaint   Patient presents with    Abscess     Left eye abscess. Abx tx and drainage recently.     Patient presents emergency department status post incision and drainage of a wound in his left upper eyelid she placed on Bactrim initially 2 days later returned to the hospital for evaluation after worsened swelling occurred she was given a dose of Rocephin a CT scan was obtained which showed no evidence of retrobulbar involvement she was continued on Bactrim but the swelling has worsened despite these medications        Review of patient's allergies indicates:   Allergen Reactions    Egg derived      Egg white    Lactose      Past Medical History:   Diagnosis Date    Acne 11/20/2020    Autism 8/21/2020    Chronic constipation 8/22/2020    Gastroesophageal reflux disease without esophagitis 8/21/2020    History of speech therapy      History reviewed. No pertinent surgical history.  Family History   Problem Relation Name Age of Onset    No Known Problems Mother      No Known Problems Father      Cataracts Maternal Grandfather      Glaucoma Neg Hx      Macular degeneration Neg Hx       Social History     Tobacco Use    Smoking status: Never    Smokeless tobacco: Never   Substance Use Topics    Alcohol use: Never    Drug use: Never     Review of Systems   Eyes:  Negative for photophobia, discharge, redness and visual disturbance.        Periorbital swelling erythema   Skin:  Positive for color change and wound.   All other systems reviewed and are negative.      Physical Exam     Initial Vitals [01/08/25 2025]   BP Pulse Resp Temp SpO2   131/71 88 16 98.2 °F (36.8 °C) 99 %      MAP       --         Physical Exam    Constitutional: She appears well-developed and well-nourished. No distress.   HENT:   Head: Normocephalic and atraumatic.   Right Ear: External ear normal.   Left Ear: External ear normal. Mouth/Throat: Oropharynx is clear and moist.   Eyes: Conjunctivae and  EOM are normal. Pupils are equal, round, and reactive to light. Left conjunctiva is not injected. Left conjunctiva has no hemorrhage. Left eye exhibits normal extraocular motion.   Left-sided periorbital swelling with erythema the upper lid and and for but region with mild induration to the left upper lid globe is intact conjunctival normal no drainage no pain with extraocular muscle movement   Neck: Neck supple.   Normal range of motion.  Cardiovascular:  Normal rate, normal heart sounds and intact distal pulses.           Pulmonary/Chest: No respiratory distress.   Musculoskeletal:         General: No edema. Normal range of motion.      Cervical back: Normal range of motion and neck supple.     Lymphadenopathy:     She has no cervical adenopathy.   Neurological: She is alert and oriented to person, place, and time. She has normal strength. GCS score is 15. GCS eye subscore is 4. GCS verbal subscore is 5. GCS motor subscore is 6.   Skin: Skin is warm and dry. Capillary refill takes less than 2 seconds. No rash noted. There is erythema.   Psychiatric: She has a normal mood and affect. Her behavior is normal.         ED Course   I & D - Incision and Drainage    Date/Time: 1/8/2025 8:21 PM  Location procedure was performed: [facility] EMERGENCY DEPARTMENT    Performed by: Fred Chappell MD  Authorized by: Reynaldo Paulson MD  Type: abscess  Body area: head/neck  Location details: left eyelid  Anesthesia: local infiltration    Anesthesia:  Local Anesthetic: lidocaine 1% without epinephrine  Scalpel size: 11  Incision type: single straight  Complexity: simple  Drainage: pus  Drainage amount: moderate  Wound treatment: incision, drainage, expression of material and wound packed  Packing material: 1/4 in gauze  Patient tolerance: Patient tolerated the procedure well with no immediate complications        Labs Reviewed   CBC W/ AUTO DIFFERENTIAL - Abnormal       Result Value    WBC 7.98      RBC 4.44      Hemoglobin 12.7       Hematocrit 38.5      MCV 87      MCH 28.6      MCHC 33.0      RDW 14.2      Platelets 364      MPV 10.2      Immature Granulocytes 0.4      Gran # (ANC) 4.6      Immature Grans (Abs) 0.03      Lymph # 1.8      Mono # 0.7      Eos # 0.7 (*)     Baso # 0.06      nRBC 0      Gran % 58.1      Lymph % 22.8      Mono % 9.0      Eosinophil % 8.9 (*)     Basophil % 0.8      Differential Method Automated     COMPREHENSIVE METABOLIC PANEL - Abnormal    Sodium 136      Potassium 4.0      Chloride 106      CO2 20 (*)     Glucose 96      BUN 7      Creatinine 0.8      Calcium 9.3      Total Protein 7.7      Albumin 4.1      Total Bilirubin 0.3      Alkaline Phosphatase 77      AST 15      ALT 15      eGFR >60      Anion Gap 10     CULTURE, AEROBIC  (SPECIFY SOURCE)   BASIC METABOLIC PANEL   MAGNESIUM   PHOSPHORUS   CBC W/ AUTO DIFFERENTIAL          Imaging Results              CT Maxillofacial With Contrast (Final result)  Result time 01/08/25 22:00:39      Final result by Choco Cool DO (01/08/25 22:00:39)                   Impression:      1. Left-sided blepharitis with a small abscess.  2. Paranasal sinus disease as above.      Electronically signed by: Choco Cool  Date:    01/08/2025  Time:    22:00               Narrative:    EXAMINATION:  CT MAXILLOFACIAL WITH CONTRAST    CLINICAL HISTORY:  Maxillary/facial abscess;    TECHNIQUE:  Multiplanar images of the maxillofacial region and the neck after the uncomplicated intravenous administration of 100mL of Omnipaque 350.    COMPARISON:  None available.    FINDINGS:  There is a left supraorbital rim enhancing fluid collection measuring approximately 1.8 x 0.6 x 0.9 cm (series 2, image 193), with surrounding soft tissue thickening and enhancement, compatible with blepharitis and an abscess.  To a lesser extent, there is mild left infraorbital soft tissue thickening and enhancement, compatible with cellulitis.  The remaining soft tissues of the face are unremarkable.   The bilateral orbits are otherwise unremarkable.  There is no evidence of postseptal edema or hemorrhage.  There is no evidence of a periosteal abscess.  The bilateral globes are symmetric and intact.  The rectus muscles and optic nerves are unremarkable.  The partially visualized portions of the brain parenchyma are unremarkable.  The vasculature is unremarkable.  There is scattered dental amalgam, resulting in streak artifact.  There is mucosal thickening of the bilateral maxillary, sphenoid, ethmoid, and frontal sinuses, with near complete opacification of the bilateral frontal sinuses.  The mastoid air cells are clear.                                       Medications   LIDOcaine (PF) 10 mg/ml (1%) injection 100 mg (has no administration in time range)   sodium chloride 0.9% flush 3 mL (has no administration in time range)   melatonin tablet 9 mg (has no administration in time range)   ondansetron injection 8 mg (has no administration in time range)   polyethylene glycol packet 17 g (has no administration in time range)   acetaminophen tablet 650 mg (has no administration in time range)   simethicone chewable tablet 80 mg (has no administration in time range)   aluminum-magnesium hydroxide-simethicone 200-200-20 mg/5 mL suspension 30 mL (has no administration in time range)   acetaminophen tablet 650 mg (has no administration in time range)   naloxone 0.4 mg/mL injection 0.02 mg (has no administration in time range)   potassium bicarbonate disintegrating tablet 50 mEq (has no administration in time range)   potassium bicarbonate disintegrating tablet 35 mEq (has no administration in time range)   potassium bicarbonate disintegrating tablet 60 mEq (has no administration in time range)   magnesium oxide tablet 800 mg (has no administration in time range)   magnesium oxide tablet 800 mg (has no administration in time range)   potassium, sodium phosphates 280-160-250 mg packet 2 packet (has no administration in time  range)   potassium, sodium phosphates 280-160-250 mg packet 2 packet (has no administration in time range)   potassium, sodium phosphates 280-160-250 mg packet 2 packet (has no administration in time range)   glucose chewable tablet 16 g (has no administration in time range)   glucose chewable tablet 24 g (has no administration in time range)   glucagon (human recombinant) injection 1 mg (has no administration in time range)   dextrose 10% bolus 125 mL 125 mL (has no administration in time range)   dextrose 10% bolus 250 mL 250 mL (has no administration in time range)   ibuprofen tablet 400 mg (has no administration in time range)   HYDROcodone-acetaminophen 5-325 mg per tablet 1 tablet (has no administration in time range)   famotidine tablet 20 mg (has no administration in time range)   influenza (Flulaval, Fluzone, Fluarix) 45 mcg/0.5 mL IM vaccine (> or = 6 mo) 0.5 mL (has no administration in time range)   piperacillin-tazobactam (ZOSYN) 3.375 g in D5W 100 mL IVPB (MB+) (0 g Intravenous Stopped 1/8/25 2221)   iohexoL (OMNIPAQUE 350) 350 mg iodine/mL injection (100 mLs  Given 1/8/25 2136)   mupirocin 2 % ointment 1 Tube (1 Tube Topical (Top) Given 1/9/25 0146)     Medical Decision Making  I and D of the abscess performed in the emergency department patient has received IV Zosyn I have discussed patient's findings with Ms. Shahla BAXTER who will evaluate patient in the ER for admission    Amount and/or Complexity of Data Reviewed  Labs: ordered.  Radiology: ordered.    Risk  Prescription drug management.                                      Clinical Impression:  Final diagnoses:  [H00.036] Eyelid abscess, left          ED Disposition Condition    Observation                 Fred Chappell MD  01/09/25 6764

## 2025-01-09 NOTE — PROGRESS NOTES
[facility]  Progress Note    Patient Name: Alanna Hammer  MRN: [id number]  Patient Class: OP- Observation   Admission Date: 1/8/2025  Length of Stay: 0 days  Attending Physician: Shane Hirsch MD  Primary Care Provider: Cristy Valentin FNP-C        Subjective     Principal Problem:Eyelid abscess, left        HPI:  Arti Hammer is a 24 year old female with a past medical history of autism and GERD who presented to the ED with a complaint of left eye lid swelling. She states that she had an abrasion to the left eyelid that she started picking. She started having surrounding erythema and swelling with no drainage. She was seen in the ED and placed on Bactrim and Bactroban ointment. She had a needle I&D with minimal drainage. She took 3 doses of Bactrim and states that the eyelid has become much more swollen and red, with intermittent pain. Her mother has tried squeezing the area with no success. She denies pain with eye movement and denies visual disturbance. She denies other complaint. ED work up included a CBC and CMP which were unremarkable. CT Maxillofacial confirmed a left sided blepharitis with a small abscess. The site was I&D' ed in the ED. She was initiated on Zosyn. Hospital Medicine consulted for admission and further management.    Overview/Hospital Course:  Patient is a 24 year old female who was admitted with abscess over the left upper eyelid. Patient had taken three days of Bactrim at home without improvement. Patient had bedside I&D in the Ed. Patient was admitted, started on Zosyn.     Interval History: left upper eyelid is swollen and erythematous, Patient is afebrile. No other complains or overnight events.     Review of Systems  Objective:     Vital Signs (Most Recent):  Temp: 98.7 °F (37.1 °C) (01/09/25 0400)  Pulse: 72 (01/09/25 1003)  Resp: 17 (01/09/25 1003)  BP: (!) 108/56 (01/09/25 1003)  SpO2: 98 % (01/09/25 1003) Vital Signs (24h  Range):  Temp:  [98.2 °F (36.8 °C)-98.7 °F (37.1 °C)] 98.7 °F (37.1 °C)  Pulse:  [66-88] 72  Resp:  [16-17] 17  SpO2:  [97 %-100 %] 98 %  BP: ()/(55-71) 108/56     Weight: 75.8 kg (167 lb 1.7 oz)  Body mass index is 27.81 kg/m².    Intake/Output Summary (Last 24 hours) at 1/9/2025 1408  Last data filed at 1/9/2025 0910  Gross per 24 hour   Intake 200 ml   Output --   Net 200 ml         hysical Exam  Vitals and nursing note reviewed.   Constitutional:       General: She is awake. She is not in acute distress.     Appearance: Normal appearance. She is well-developed. She is not ill-appearing.   HENT:      Head: Normocephalic and atraumatic.      Mouth/Throat:      Lips: Pink.      Mouth: Mucous membranes are moist.   Eyes:      General: left eye is swollen, upper eye lid in specific, there is erythema and small packing on      Conjunctiva/sclera: Conjunctivae normal.      Pupils: Pupils are equal, round, and reactive to light.   Neck:      Thyroid: No thyromegaly.      Vascular: No JVD.   Cardiovascular:      Rate and Rhythm: Normal rate and regular rhythm.      Heart sounds: Normal heart sounds, S1 normal and S2 normal. No murmur heard.     No friction rub. No gallop.   Pulmonary:      Effort: Pulmonary effort is normal.      Breath sounds: Normal breath sounds.   Abdominal:      General: Bowel sounds are normal. There is no distension.      Palpations: Abdomen is soft. There is no mass.      Tenderness: There is no abdominal tenderness.   Musculoskeletal:         General: Normal range of motion.      Cervical back: Full passive range of motion without pain, normal range of motion and neck supple.   Skin:     General: Skin is warm and dry.      Capillary Refill: Capillary refill takes less than 2 seconds.   Neurological:      General: No focal deficit present.      Mental Status: She is alert and oriented to person, place, and time. Mental status is at baseline.      GCS: GCS eye subscore is 4. GCS verbal  subscore is 5. GCS motor subscore is 6.      Cranial Nerves: No cranial nerve deficit.      Sensory: Sensation is intact.      Motor: Motor function is intact.   Psychiatric:         Behavior: Behavior normal. Behavior is cooperative.        Significant Labs: All pertinent labs within the past 24 hours have been reviewed.  CBC:   Recent Labs   Lab 01/08/25 2103 01/09/25  0509   WBC 7.98 6.92   HGB 12.7 11.7*   HCT 38.5 35.4*    305     CMP:   Recent Labs   Lab 01/08/25 2103 01/09/25  0509    134*   K 4.0 3.7    106   CO2 20* 20*   GLU 96 92   BUN 7 6   CREATININE 0.8 0.8   CALCIUM 9.3 8.9   PROT 7.7  --    ALBUMIN 4.1  --    BILITOT 0.3  --    ALKPHOS 77  --    AST 15  --    ALT 15  --    ANIONGAP 10 8       Significant Imaging: I have reviewed all pertinent imaging results/findings within the past 24 hours.    Assessment and Plan     * Eyelid abscess, left  Blepharitis noted to left eyelid with abscess  I&D'ed with needle previously, I&D'ed today in ED  cont Zosyn  Warm compresses  Pain meds PRN  Follow culture       VTE Risk Mitigation (From admission, onward)           Ordered     IP VTE LOW RISK PATIENT  Once         01/08/25 2251     Place sequential compression device  Until discontinued         01/08/25 2251                    Discharge Planning   SHANON: 1/10/2025     Code Status: Full Code   Medical Readiness for Discharge Date:   Discharge Plan A: Home                        Shane Hirsch MD  Department of Hospital Medicine   [facility]

## 2025-01-09 NOTE — TELEPHONE ENCOUNTER
RN Navigator called patient this a.m. originally to confirm tomorrow's 11:30 ED follow up, and to offer her an appt for this afternoon at 4:00 p.m. since she had originally wanted to come in sooner, however pt. Notified me that she is instead back in the ER as we speak.    RN Navigator will keep tomorrow's ED follow up, for pt. just in case she again gets released from the ED and needs to seen for follow up tomorrow.

## 2025-01-09 NOTE — ASSESSMENT & PLAN NOTE
Blepharitis noted to left eyelid with abscess  I&D'ed with needle previously, I&D'ed today in ED  cont Zosyn  Warm compresses  Pain meds PRN  Follow culture

## 2025-01-09 NOTE — SUBJECTIVE & OBJECTIVE
Interval History: left upper eyelid is swollen and erythematous, Patient is afebrile. No other complains or overnight events.     Review of Systems  Objective:     Vital Signs (Most Recent):  Temp: 98.7 °F (37.1 °C) (01/09/25 0400)  Pulse: 72 (01/09/25 1003)  Resp: 17 (01/09/25 1003)  BP: (!) 108/56 (01/09/25 1003)  SpO2: 98 % (01/09/25 1003) Vital Signs (24h Range):  Temp:  [98.2 °F (36.8 °C)-98.7 °F (37.1 °C)] 98.7 °F (37.1 °C)  Pulse:  [66-88] 72  Resp:  [16-17] 17  SpO2:  [97 %-100 %] 98 %  BP: ()/(55-71) 108/56     Weight: 75.8 kg (167 lb 1.7 oz)  Body mass index is 27.81 kg/m².    Intake/Output Summary (Last 24 hours) at 1/9/2025 1408  Last data filed at 1/9/2025 0910  Gross per 24 hour   Intake 200 ml   Output --   Net 200 ml         hysical Exam  Vitals and nursing note reviewed.   Constitutional:       General: She is awake. She is not in acute distress.     Appearance: Normal appearance. She is well-developed. She is not ill-appearing.   HENT:      Head: Normocephalic and atraumatic.      Mouth/Throat:      Lips: Pink.      Mouth: Mucous membranes are moist.   Eyes:      General: left eye is swollen, upper eye lid in specific, there is erythema and small packing on      Conjunctiva/sclera: Conjunctivae normal.      Pupils: Pupils are equal, round, and reactive to light.   Neck:      Thyroid: No thyromegaly.      Vascular: No JVD.   Cardiovascular:      Rate and Rhythm: Normal rate and regular rhythm.      Heart sounds: Normal heart sounds, S1 normal and S2 normal. No murmur heard.     No friction rub. No gallop.   Pulmonary:      Effort: Pulmonary effort is normal.      Breath sounds: Normal breath sounds.   Abdominal:      General: Bowel sounds are normal. There is no distension.      Palpations: Abdomen is soft. There is no mass.      Tenderness: There is no abdominal tenderness.   Musculoskeletal:         General: Normal range of motion.      Cervical back: Full passive range of motion without  pain, normal range of motion and neck supple.   Skin:     General: Skin is warm and dry.      Capillary Refill: Capillary refill takes less than 2 seconds.   Neurological:      General: No focal deficit present.      Mental Status: She is alert and oriented to person, place, and time. Mental status is at baseline.      GCS: GCS eye subscore is 4. GCS verbal subscore is 5. GCS motor subscore is 6.      Cranial Nerves: No cranial nerve deficit.      Sensory: Sensation is intact.      Motor: Motor function is intact.   Psychiatric:         Behavior: Behavior normal. Behavior is cooperative.        Significant Labs: All pertinent labs within the past 24 hours have been reviewed.  CBC:   Recent Labs   Lab 01/08/25 2103 01/09/25  0509   WBC 7.98 6.92   HGB 12.7 11.7*   HCT 38.5 35.4*    305     CMP:   Recent Labs   Lab 01/08/25 2103 01/09/25  0509    134*   K 4.0 3.7    106   CO2 20* 20*   GLU 96 92   BUN 7 6   CREATININE 0.8 0.8   CALCIUM 9.3 8.9   PROT 7.7  --    ALBUMIN 4.1  --    BILITOT 0.3  --    ALKPHOS 77  --    AST 15  --    ALT 15  --    ANIONGAP 10 8       Significant Imaging: I have reviewed all pertinent imaging results/findings within the past 24 hours.

## 2025-01-09 NOTE — CARE UPDATE
01/09/25 0528   Patient Assessment/Suction   Level of Consciousness (AVPU) alert   PRE-TX-O2   Device (Oxygen Therapy) room air   SpO2 99 %   Pulse 66   Resp 16   Tobacco Cessation Intervention   Do you use any type of tobacco product? No   Respiratory Evaluation   $ Care Plan Tech Time 15 min   Evaluation For New Orders   Admitting Diagnosis eye abscess   Cardiac Diagnosis n/a   Pulmonary Diagnosis n/a   Current Surgeries none at this time   Home Oxygen   Has Home Oxygen? No   Oxygen Care Plan   Oxygen Care Plan Per Protocol   Bronchodilator Care Plan   Rationale No Rationale found   Atelectasis Care Plan   Rationale No Rational Found   Airway Clearance Care Plan   Rationale No rationale found

## 2025-01-09 NOTE — ASSESSMENT & PLAN NOTE
Admit to obs  3rd visit for worsening eyelid abscess  Blepharitis noted to left eyelid with abscess  I&D'ed with needle previously, I&D'ed today in ED  Previously on Bactrim, Initiated on Zosyn  Warm compresses  Pain meds PRN

## 2025-01-09 NOTE — PLAN OF CARE
[facility] - ED  Initial Discharge Assessment       Primary Care Provider: Cristy Valentin FNP-C    Admission Diagnosis: Eyelid abscess, left [H00.036]    Admission Date: 1/8/2025  Expected Discharge Date: 1/10/2025    Assessment completed at bedside. All information verified on facesheet. Patient is independent, drives herself. No HH/HD/DME/Coumadin needs.   Pharmacy is [facility] on [address]   Patient mother will provide transportation at discharge.     Transition of Care Barriers: None    Payor: MEDICAID / [facility] / Product Type: Managed Medicaid /     Extended Emergency Contact Information  Primary Emergency Contact: Elías Hammer  Mobile Phone: [phone]  Relation: Mother  Preferred language: English   needed? No    Discharge Plan A: Home  Discharge Plan B: Home with family      [facility] [place] - [address]  [address] [address] [address]  Phone: [phone] Fax: [phone]      Initial Assessment (most recent)       Adult Discharge Assessment - 01/09/25 1043          Discharge Assessment    Assessment Type Discharge Planning Assessment     Confirmed/corrected address, phone number and insurance Yes     Confirmed Demographics Correct on Facesheet     Source of Information patient     Does patient/caregiver understand observation status Yes     Communicated SHANON with patient/caregiver Yes     Reason For Admission left eyelid abscess     People in Home parent(s)     Facility Arrived From: home     Do you expect to return to your current living situation? Yes     Do you have help at home or someone to help you manage your care at home? Yes     Who are your caregiver(s) and their phone number(s)? Mother- Elías     Prior to hospitilization cognitive status: Alert/Oriented     Current cognitive status: Alert/Oriented     Walking or Climbing Stairs Difficulty no     Dressing/Bathing Difficulty no     Equipment  Currently Used at Home none     Readmission within 30 days? No     Patient currently being followed by outpatient case management? No     Do you currently have service(s) that help you manage your care at home? No     Do you take prescription medications? Yes     Do you have prescription coverage? Yes     Do you have any problems affording any of your prescribed medications? No     Is the patient taking medications as prescribed? yes     How do you get to doctors appointments? family or friend will provide     Are you on dialysis? No     Do you take coumadin? No     Discharge Plan A Home     Discharge Plan B Home with family     DME Needed Upon Discharge  none     Discharge Plan discussed with: Patient     Transition of Care Barriers None

## 2025-01-09 NOTE — PLAN OF CARE
Patient brought to the floor by ED Nurse Zayda via wheelchair. Patient ambulated from chair to bathroom to bed independently. No c/o pain or discomfort. Skin, warm & dry to touch. Hand  equal. Respirations even and unlabored. No distress noted. Pedal pulses present bilaterally. Left eye incision closed with sutures dry & intact; No drainage from site. Able to make needs known. Verbalize understanding.  Left 20g IV No redness, swelling or drainage at site. Dressing is dry and intact.

## 2025-01-09 NOTE — SUBJECTIVE & OBJECTIVE
Past Medical History:   Diagnosis Date    Acne 11/20/2020    Autism 8/21/2020    Chronic constipation 8/22/2020    Gastroesophageal reflux disease without esophagitis 8/21/2020    History of speech therapy        History reviewed. No pertinent surgical history.    Review of patient's allergies indicates:   Allergen Reactions    Egg derived      Egg white    Lactose        No current facility-administered medications on file prior to encounter.     Current Outpatient Medications on File Prior to Encounter   Medication Sig    acetaminophen (TYLENOL) 80 MG Chew Take by mouth.    ammonium lactate 12 % Crea Apply topically 2 (two) times daily.    diazePAM (VALIUM) 5 MG tablet Take 1 tablet (5 mg total) by mouth as needed for Anxiety.    diphenhydrAMINE (BENYLIN) 12.5 mg/5 mL liquid Take by mouth 4 (four) times daily as needed for Allergies.    ibuprofen (ADVIL,MOTRIN) 100 mg/5 mL suspension Take 15 mLs by mouth every 6 (six) hours as needed for Temperature greater than.    iron fum-B12-IF-C-folic acid (FOLTRIN) 110-0.5 mg capsule Take 1 capsule by mouth 2 (two) times daily.    lactase (LACTAID) 3,000 unit tablet Take 1 tablet by mouth 3 (three) times daily with meals.    Lactobacillus acidophilus (PROBIOTIC ACIDOPHILUS ORAL) Take by mouth.    metronidazole 0.75% (METROCREAM) 0.75 % Crea Apply topically 2 (two) times daily.    mupirocin (BACTROBAN) 2 % ointment Apply topically 2 (two) times daily. for 7 days    polyethylene glycol (GLYCOLAX) 17 gram/dose powder Take 17 g by mouth as needed.    polysaccharide iron complex (NOVAFERRUM 50) 50 mg iron Cap Take 1 capsule by mouth once daily.    sulfamethoxazole-trimethoprim 800-160mg (BACTRIM DS) 800-160 mg Tab Take 1 tablet by mouth 2 (two) times daily. for 7 days     Family History       Problem Relation (Age of Onset)    Cataracts Maternal Grandfather    No Known Problems Mother, Father          Tobacco Use    Smoking status: Never    Smokeless tobacco: Never   Substance  and Sexual Activity    Alcohol use: Never    Drug use: Never    Sexual activity: Never     Review of Systems   Constitutional:  Negative for chills and fever.   HENT:  Negative for congestion and sore throat.    Eyes:  Positive for pain and redness. Negative for visual disturbance.        Eyelid swelling   Respiratory:  Negative for cough and shortness of breath.    Cardiovascular:  Negative for chest pain and palpitations.   Gastrointestinal:  Negative for abdominal pain, nausea and vomiting.   Endocrine: Negative for cold intolerance and heat intolerance.   Genitourinary:  Negative for dysuria and hematuria.   Musculoskeletal:  Negative for arthralgias and myalgias.   Skin:  Negative for pallor and rash.   Neurological:  Negative for tremors and seizures.   Hematological:  Negative for adenopathy. Does not bruise/bleed easily.   Psychiatric/Behavioral:  Negative for hallucinations.    All other systems reviewed and are negative.    Objective:     Vital Signs (Most Recent):  Temp: 98.2 °F (36.8 °C) (01/08/25 2025)  Pulse: 88 (01/08/25 2025)  Resp: 16 (01/08/25 2025)  BP: 131/71 (01/08/25 2025)  SpO2: 99 % (01/08/25 2025) Vital Signs (24h Range):  Temp:  [98.2 °F (36.8 °C)] 98.2 °F (36.8 °C)  Pulse:  [88] 88  Resp:  [16] 16  SpO2:  [99 %] 99 %  BP: (131)/(71) 131/71     Weight: 75.8 kg (167 lb 1.7 oz)  Body mass index is 27.81 kg/m².     Physical Exam  Vitals and nursing note reviewed.   Constitutional:       General: She is awake. She is not in acute distress.     Appearance: Normal appearance. She is well-developed. She is not ill-appearing.   HENT:      Head: Normocephalic and atraumatic.      Mouth/Throat:      Lips: Pink.      Mouth: Mucous membranes are moist.   Eyes:      General: Vision grossly intact. Gaze aligned appropriately.      Extraocular Movements: Extraocular movements intact.      Conjunctiva/sclera: Conjunctivae normal.      Pupils: Pupils are equal, round, and reactive to light.     Neck:       Thyroid: No thyromegaly.      Vascular: No JVD.   Cardiovascular:      Rate and Rhythm: Normal rate and regular rhythm.      Heart sounds: Normal heart sounds, S1 normal and S2 normal. No murmur heard.     No friction rub. No gallop.   Pulmonary:      Effort: Pulmonary effort is normal.      Breath sounds: Normal breath sounds.   Abdominal:      General: Bowel sounds are normal. There is no distension.      Palpations: Abdomen is soft. There is no mass.      Tenderness: There is no abdominal tenderness.   Musculoskeletal:         General: Normal range of motion.      Cervical back: Full passive range of motion without pain, normal range of motion and neck supple.   Skin:     General: Skin is warm and dry.      Capillary Refill: Capillary refill takes less than 2 seconds.   Neurological:      General: No focal deficit present.      Mental Status: She is alert and oriented to person, place, and time. Mental status is at baseline.      GCS: GCS eye subscore is 4. GCS verbal subscore is 5. GCS motor subscore is 6.      Cranial Nerves: No cranial nerve deficit.      Sensory: Sensation is intact.      Motor: Motor function is intact.   Psychiatric:         Behavior: Behavior normal. Behavior is cooperative.              CRANIAL NERVES     CN III, IV, VI   Pupils are equal, round, and reactive to light.       Significant Labs: All pertinent labs within the past 24 hours have been reviewed.  CBC:   Recent Labs   Lab 01/08/25  2103   WBC 7.98   HGB 12.7   HCT 38.5        CMP:   Recent Labs   Lab 01/08/25  2103      K 4.0      CO2 20*   GLU 96   BUN 7   CREATININE 0.8   CALCIUM 9.3   PROT 7.7   ALBUMIN 4.1   BILITOT 0.3   ALKPHOS 77   AST 15   ALT 15   ANIONGAP 10       Significant Imaging: I have reviewed all pertinent imaging results/findings within the past 24 hours.  Imaging Results              CT Maxillofacial With Contrast (Final result)  Result time 01/08/25 22:00:39      Final result by Silvina  Choco FARMER DO (01/08/25 22:00:39)                   Impression:      1. Left-sided blepharitis with a small abscess.  2. Paranasal sinus disease as above.      Electronically signed by: Choco Cool  Date:    01/08/2025  Time:    22:00               Narrative:    EXAMINATION:  CT MAXILLOFACIAL WITH CONTRAST    CLINICAL HISTORY:  Maxillary/facial abscess;    TECHNIQUE:  Multiplanar images of the maxillofacial region and the neck after the uncomplicated intravenous administration of 100mL of Omnipaque 350.    COMPARISON:  None available.    FINDINGS:  There is a left supraorbital rim enhancing fluid collection measuring approximately 1.8 x 0.6 x 0.9 cm (series 2, image 193), with surrounding soft tissue thickening and enhancement, compatible with blepharitis and an abscess.  To a lesser extent, there is mild left infraorbital soft tissue thickening and enhancement, compatible with cellulitis.  The remaining soft tissues of the face are unremarkable.  The bilateral orbits are otherwise unremarkable.  There is no evidence of postseptal edema or hemorrhage.  There is no evidence of a periosteal abscess.  The bilateral globes are symmetric and intact.  The rectus muscles and optic nerves are unremarkable.  The partially visualized portions of the brain parenchyma are unremarkable.  The vasculature is unremarkable.  There is scattered dental amalgam, resulting in streak artifact.  There is mucosal thickening of the bilateral maxillary, sphenoid, ethmoid, and frontal sinuses, with near complete opacification of the bilateral frontal sinuses.  The mastoid air cells are clear.

## 2025-01-09 NOTE — H&P
[facility]  History & Physical    Patient Name: Alanna Hammer  MRN: [id number]  Patient Class: OP- Observation  Admission Date: 1/8/2025  Attending Physician: Reynaldo Paulson MD   Primary Care Provider: Cristy Valentin FNP-C         Patient information was obtained from patient, parent, past medical records, and ER records.     Subjective:     Principal Problem:Eyelid abscess, left    Chief Complaint:   Chief Complaint   Patient presents with    Abscess     Left eye abscess. Abx tx and drainage recently.        HPI: Arti Hammer is a 24 year old female with a past medical history of autism and GERD who presented to the ED with a complaint of left eye lid swelling. She states that she had an abrasion to the left eyelid that she started picking. She started having surrounding erythema and swelling with no drainage. She was seen in the ED and placed on Bactrim and Bactroban ointment. She had a needle I&D with minimal drainage. She took 3 doses of Bactrim and states that the eyelid has become much more swollen and red, with intermittent pain. Her mother has tried squeezing the area with no success. She denies pain with eye movement and denies visual disturbance. She denies other complaint. ED work up included a CBC and CMP which were unremarkable. CT Maxillofacial confirmed a left sided blepharitis with a small abscess. The site was I&D' ed in the ED. She was initiated on Zosyn. Hospital Medicine consulted for admission and further management.    Past Medical History:   Diagnosis Date    Acne 11/20/2020    Autism 8/21/2020    Chronic constipation 8/22/2020    Gastroesophageal reflux disease without esophagitis 8/21/2020    History of speech therapy        History reviewed. No pertinent surgical history.    Review of patient's allergies indicates:   Allergen Reactions    Egg derived      Egg white    Lactose        No current facility-administered medications on file  prior to encounter.     Current Outpatient Medications on File Prior to Encounter   Medication Sig    acetaminophen (TYLENOL) 80 MG Chew Take by mouth.    ammonium lactate 12 % Crea Apply topically 2 (two) times daily.    diazePAM (VALIUM) 5 MG tablet Take 1 tablet (5 mg total) by mouth as needed for Anxiety.    diphenhydrAMINE (BENYLIN) 12.5 mg/5 mL liquid Take by mouth 4 (four) times daily as needed for Allergies.    ibuprofen (ADVIL,MOTRIN) 100 mg/5 mL suspension Take 15 mLs by mouth every 6 (six) hours as needed for Temperature greater than.    iron fum-B12-IF-C-folic acid (FOLTRIN) 110-0.5 mg capsule Take 1 capsule by mouth 2 (two) times daily.    lactase (LACTAID) 3,000 unit tablet Take 1 tablet by mouth 3 (three) times daily with meals.    Lactobacillus acidophilus (PROBIOTIC ACIDOPHILUS ORAL) Take by mouth.    metronidazole 0.75% (METROCREAM) 0.75 % Crea Apply topically 2 (two) times daily.    mupirocin (BACTROBAN) 2 % ointment Apply topically 2 (two) times daily. for 7 days    polyethylene glycol (GLYCOLAX) 17 gram/dose powder Take 17 g by mouth as needed.    polysaccharide iron complex (NOVAFERRUM 50) 50 mg iron Cap Take 1 capsule by mouth once daily.    sulfamethoxazole-trimethoprim 800-160mg (BACTRIM DS) 800-160 mg Tab Take 1 tablet by mouth 2 (two) times daily. for 7 days     Family History       Problem Relation (Age of Onset)    Cataracts Maternal Grandfather    No Known Problems Mother, Father          Tobacco Use    Smoking status: Never    Smokeless tobacco: Never   Substance and Sexual Activity    Alcohol use: Never    Drug use: Never    Sexual activity: Never     Review of Systems   Constitutional:  Negative for chills and fever.   HENT:  Negative for congestion and sore throat.    Eyes:  Positive for pain and redness. Negative for visual disturbance.        Eyelid swelling   Respiratory:  Negative for cough and shortness of breath.    Cardiovascular:  Negative for chest pain and palpitations.    Gastrointestinal:  Negative for abdominal pain, nausea and vomiting.   Endocrine: Negative for cold intolerance and heat intolerance.   Genitourinary:  Negative for dysuria and hematuria.   Musculoskeletal:  Negative for arthralgias and myalgias.   Skin:  Negative for pallor and rash.   Neurological:  Negative for tremors and seizures.   Hematological:  Negative for adenopathy. Does not bruise/bleed easily.   Psychiatric/Behavioral:  Negative for hallucinations.    All other systems reviewed and are negative.    Objective:     Vital Signs (Most Recent):  Temp: 98.2 °F (36.8 °C) (01/08/25 2025)  Pulse: 88 (01/08/25 2025)  Resp: 16 (01/08/25 2025)  BP: 131/71 (01/08/25 2025)  SpO2: 99 % (01/08/25 2025) Vital Signs (24h Range):  Temp:  [98.2 °F (36.8 °C)] 98.2 °F (36.8 °C)  Pulse:  [88] 88  Resp:  [16] 16  SpO2:  [99 %] 99 %  BP: (131)/(71) 131/71     Weight: 75.8 kg (167 lb 1.7 oz)  Body mass index is 27.81 kg/m².     Physical Exam  Vitals and nursing note reviewed.   Constitutional:       General: She is awake. She is not in acute distress.     Appearance: Normal appearance. She is well-developed. She is not ill-appearing.   HENT:      Head: Normocephalic and atraumatic.      Mouth/Throat:      Lips: Pink.      Mouth: Mucous membranes are moist.   Eyes:      General: Vision grossly intact. Gaze aligned appropriately.      Extraocular Movements: Extraocular movements intact.      Conjunctiva/sclera: Conjunctivae normal.      Pupils: Pupils are equal, round, and reactive to light.     Neck:      Thyroid: No thyromegaly.      Vascular: No JVD.   Cardiovascular:      Rate and Rhythm: Normal rate and regular rhythm.      Heart sounds: Normal heart sounds, S1 normal and S2 normal. No murmur heard.     No friction rub. No gallop.   Pulmonary:      Effort: Pulmonary effort is normal.      Breath sounds: Normal breath sounds.   Abdominal:      General: Bowel sounds are normal. There is no distension.      Palpations:  Abdomen is soft. There is no mass.      Tenderness: There is no abdominal tenderness.   Musculoskeletal:         General: Normal range of motion.      Cervical back: Full passive range of motion without pain, normal range of motion and neck supple.   Skin:     General: Skin is warm and dry.      Capillary Refill: Capillary refill takes less than 2 seconds.   Neurological:      General: No focal deficit present.      Mental Status: She is alert and oriented to person, place, and time. Mental status is at baseline.      GCS: GCS eye subscore is 4. GCS verbal subscore is 5. GCS motor subscore is 6.      Cranial Nerves: No cranial nerve deficit.      Sensory: Sensation is intact.      Motor: Motor function is intact.   Psychiatric:         Behavior: Behavior normal. Behavior is cooperative.              CRANIAL NERVES     CN III, IV, VI   Pupils are equal, round, and reactive to light.       Significant Labs: All pertinent labs within the past 24 hours have been reviewed.  CBC:   Recent Labs   Lab 01/08/25  2103   WBC 7.98   HGB 12.7   HCT 38.5        CMP:   Recent Labs   Lab 01/08/25  2103      K 4.0      CO2 20*   GLU 96   BUN 7   CREATININE 0.8   CALCIUM 9.3   PROT 7.7   ALBUMIN 4.1   BILITOT 0.3   ALKPHOS 77   AST 15   ALT 15   ANIONGAP 10       Significant Imaging: I have reviewed all pertinent imaging results/findings within the past 24 hours.  Imaging Results              CT Maxillofacial With Contrast (Final result)  Result time 01/08/25 22:00:39      Final result by Choco Cool DO (01/08/25 22:00:39)                   Impression:      1. Left-sided blepharitis with a small abscess.  2. Paranasal sinus disease as above.      Electronically signed by: Choco Cool  Date:    01/08/2025  Time:    22:00               Narrative:    EXAMINATION:  CT MAXILLOFACIAL WITH CONTRAST    CLINICAL HISTORY:  Maxillary/facial abscess;    TECHNIQUE:  Multiplanar images of the maxillofacial region and  the neck after the uncomplicated intravenous administration of 100mL of Omnipaque 350.    COMPARISON:  None available.    FINDINGS:  There is a left supraorbital rim enhancing fluid collection measuring approximately 1.8 x 0.6 x 0.9 cm (series 2, image 193), with surrounding soft tissue thickening and enhancement, compatible with blepharitis and an abscess.  To a lesser extent, there is mild left infraorbital soft tissue thickening and enhancement, compatible with cellulitis.  The remaining soft tissues of the face are unremarkable.  The bilateral orbits are otherwise unremarkable.  There is no evidence of postseptal edema or hemorrhage.  There is no evidence of a periosteal abscess.  The bilateral globes are symmetric and intact.  The rectus muscles and optic nerves are unremarkable.  The partially visualized portions of the brain parenchyma are unremarkable.  The vasculature is unremarkable.  There is scattered dental amalgam, resulting in streak artifact.  There is mucosal thickening of the bilateral maxillary, sphenoid, ethmoid, and frontal sinuses, with near complete opacification of the bilateral frontal sinuses.  The mastoid air cells are clear.                                      Assessment/Plan:     * Eyelid abscess, left  Admit to obs  3rd visit for worsening eyelid abscess  Blepharitis noted to left eyelid with abscess  I&D'ed with needle previously, I&D'ed today in ED  Previously on Bactrim, Initiated on Zosyn  Warm compresses  Pain meds PRN      VTE Risk Mitigation (From admission, onward)           Ordered     IP VTE LOW RISK PATIENT  Once         01/08/25 2251     Place sequential compression device  Until discontinued         01/08/25 2251                                  DAGMAR Field  Department of Hospital Medicine  [facility]

## 2025-01-09 NOTE — HOSPITAL COURSE
Patient is a 24 year old female who was admitted with abscess over the left upper eyelid. Patient had taken three days of Bactrim at home without improvement. Patient had bedside I&D in the Ed. Patient was admitted, started on Zosyn.  Eye swelling improved. The patient was discharged home in stable condition.

## 2025-01-09 NOTE — HPI
Arti Hammer is a 24 year old female with a past medical history of autism and GERD who presented to the ED with a complaint of left eye lid swelling. She states that she had an abrasion to the left eyelid that she started picking. She started having surrounding erythema and swelling with no drainage. She was seen in the ED and placed on Bactrim and Bactroban ointment. She had a needle I&D with minimal drainage. She took 3 doses of Bactrim and states that the eyelid has become much more swollen and red, with intermittent pain. Her mother has tried squeezing the area with no success. She denies pain with eye movement and denies visual disturbance. She denies other complaint. ED work up included a CBC and CMP which were unremarkable. CT Maxillofacial confirmed a left sided blepharitis with a small abscess. The site was I&D' ed in the ED. She was initiated on Zosyn. Hospital Medicine consulted for admission and further management.

## 2025-01-10 LAB
ANION GAP SERPL CALC-SCNC: 8 MMOL/L (ref 8–16)
BASOPHILS # BLD AUTO: 0.06 K/UL (ref 0–0.2)
BASOPHILS NFR BLD: 1 % (ref 0–1.9)
BUN SERPL-MCNC: 11 MG/DL (ref 6–20)
CALCIUM SERPL-MCNC: 8.8 MG/DL (ref 8.7–10.5)
CHLORIDE SERPL-SCNC: 105 MMOL/L (ref 95–110)
CO2 SERPL-SCNC: 22 MMOL/L (ref 23–29)
CREAT SERPL-MCNC: 0.8 MG/DL (ref 0.5–1.4)
DIFFERENTIAL METHOD BLD: ABNORMAL
EOSINOPHIL # BLD AUTO: 0.9 K/UL (ref 0–0.5)
EOSINOPHIL NFR BLD: 15.4 % (ref 0–8)
ERYTHROCYTE [DISTWIDTH] IN BLOOD BY AUTOMATED COUNT: 14.4 % (ref 11.5–14.5)
EST. GFR  (NO RACE VARIABLE): >60 ML/MIN/1.73 M^2
GLUCOSE SERPL-MCNC: 89 MG/DL (ref 70–110)
HCT VFR BLD AUTO: 34.5 % (ref 37–48.5)
HGB BLD-MCNC: 11.4 G/DL (ref 12–16)
IMM GRANULOCYTES # BLD AUTO: 0.01 K/UL (ref 0–0.04)
IMM GRANULOCYTES NFR BLD AUTO: 0.2 % (ref 0–0.5)
LYMPHOCYTES # BLD AUTO: 2.1 K/UL (ref 1–4.8)
LYMPHOCYTES NFR BLD: 36 % (ref 18–48)
MAGNESIUM SERPL-MCNC: 1.8 MG/DL (ref 1.6–2.6)
MCH RBC QN AUTO: 28.7 PG (ref 27–31)
MCHC RBC AUTO-ENTMCNC: 33 G/DL (ref 32–36)
MCV RBC AUTO: 87 FL (ref 82–98)
MONOCYTES # BLD AUTO: 0.6 K/UL (ref 0.3–1)
MONOCYTES NFR BLD: 9.7 % (ref 4–15)
NEUTROPHILS # BLD AUTO: 2.2 K/UL (ref 1.8–7.7)
NEUTROPHILS NFR BLD: 37.7 % (ref 38–73)
NRBC BLD-RTO: 0 /100 WBC
PHOSPHATE SERPL-MCNC: 3.9 MG/DL (ref 2.7–4.5)
PLATELET # BLD AUTO: 319 K/UL (ref 150–450)
PMV BLD AUTO: 10.4 FL (ref 9.2–12.9)
POTASSIUM SERPL-SCNC: 3.7 MMOL/L (ref 3.5–5.1)
RBC # BLD AUTO: 3.97 M/UL (ref 4–5.4)
SODIUM SERPL-SCNC: 135 MMOL/L (ref 136–145)
WBC # BLD AUTO: 5.78 K/UL (ref 3.9–12.7)

## 2025-01-10 PROCEDURE — 96366 THER/PROPH/DIAG IV INF ADDON: CPT

## 2025-01-10 PROCEDURE — 63700000 PHARM REV CODE 250 ALT 637 W/O HCPCS: Performed by: INTERNAL MEDICINE

## 2025-01-10 PROCEDURE — G0378 HOSPITAL OBSERVATION PER HR: HCPCS

## 2025-01-10 PROCEDURE — 84100 ASSAY OF PHOSPHORUS: CPT | Performed by: NURSE PRACTITIONER

## 2025-01-10 PROCEDURE — 36415 COLL VENOUS BLD VENIPUNCTURE: CPT | Performed by: NURSE PRACTITIONER

## 2025-01-10 PROCEDURE — 85025 COMPLETE CBC W/AUTO DIFF WBC: CPT | Performed by: NURSE PRACTITIONER

## 2025-01-10 PROCEDURE — 80048 BASIC METABOLIC PNL TOTAL CA: CPT | Performed by: NURSE PRACTITIONER

## 2025-01-10 PROCEDURE — 83735 ASSAY OF MAGNESIUM: CPT | Performed by: NURSE PRACTITIONER

## 2025-01-10 PROCEDURE — 63600175 PHARM REV CODE 636 W HCPCS: Performed by: NURSE PRACTITIONER

## 2025-01-10 PROCEDURE — 63600175 PHARM REV CODE 636 W HCPCS: Performed by: INTERNAL MEDICINE

## 2025-01-10 PROCEDURE — 25000003 PHARM REV CODE 250: Performed by: NURSE PRACTITIONER

## 2025-01-10 PROCEDURE — 11000001 HC ACUTE MED/SURG PRIVATE ROOM

## 2025-01-10 PROCEDURE — 25000003 PHARM REV CODE 250: Performed by: INTERNAL MEDICINE

## 2025-01-10 RX ADMIN — PIPERACILLIN SODIUM AND TAZOBACTAM SODIUM 4.5 G: 4; .5 INJECTION, POWDER, LYOPHILIZED, FOR SOLUTION INTRAVENOUS at 04:01

## 2025-01-10 RX ADMIN — FLUCONAZOLE 150 MG: 100 TABLET ORAL at 08:01

## 2025-01-10 RX ADMIN — FAMOTIDINE 20 MG: 20 TABLET, FILM COATED ORAL at 11:01

## 2025-01-10 RX ADMIN — FAMOTIDINE 20 MG: 20 TABLET, FILM COATED ORAL at 08:01

## 2025-01-10 RX ADMIN — PIPERACILLIN SODIUM AND TAZOBACTAM SODIUM 4.5 G: 4; .5 INJECTION, POWDER, LYOPHILIZED, FOR SOLUTION INTRAVENOUS at 06:01

## 2025-01-10 RX ADMIN — PIPERACILLIN SODIUM AND TAZOBACTAM SODIUM 4.5 G: 4; .5 INJECTION, POWDER, LYOPHILIZED, FOR SOLUTION INTRAVENOUS at 11:01

## 2025-01-10 NOTE — PLAN OF CARE
Pt not ready for discharge at this time.  Receiving IV abx.       01/10/25 1258   Discharge Reassessment   Assessment Type Discharge Planning Reassessment   Did the patient's condition or plan change since previous assessment? Yes   Discharge Plan discussed with: Patient   Discharge Plan A Home   Discharge Plan B Home with family

## 2025-01-10 NOTE — PLAN OF CARE
Attempted to schedule follow up appointment with PCP.  No appointments available until 2/12/25.  Inbasket message sent to PCP office to contact pt to schedule.  Updated AVS.

## 2025-01-10 NOTE — SUBJECTIVE & OBJECTIVE
Interval History: Patient is feeling better. Swelling on the left eye is somewhat improved and more localized. Patient is pain free and afebrile.     Review of Systems  Objective:     Vital Signs (Most Recent):  Temp: 98 °F (36.7 °C) (01/10/25 1131)  Pulse: 81 (01/10/25 1131)  Resp: 16 (01/10/25 1131)  BP: (!) 111/58 (01/10/25 1131)  SpO2: 100 % (01/10/25 1131) Vital Signs (24h Range):  Temp:  [97.9 °F (36.6 °C)-98.4 °F (36.9 °C)] 98 °F (36.7 °C)  Pulse:  [64-83] 81  Resp:  [16-18] 16  SpO2:  [98 %-100 %] 100 %  BP: ()/(50-74) 111/58     Weight: 75.8 kg (167 lb 1.7 oz)  Body mass index is 27.81 kg/m².    Intake/Output Summary (Last 24 hours) at 1/10/2025 1312  Last data filed at 1/10/2025 0512  Gross per 24 hour   Intake 259.39 ml   Output --   Net 259.39 ml         Physical Exam  Vitals and nursing note reviewed.   Constitutional:       General: She is awake. She is not in acute distress.     Appearance: Normal appearance. She is well-developed. She is not ill-appearing.   HENT:      Head: Normocephalic and atraumatic.      Mouth/Throat:      Lips: Pink.      Mouth: Mucous membranes are moist.   Eyes:      General: left upper eye lid swelling is improved. Swelling is now more localized to the abscess area. there is erythema and small packing on      Conjunctiva/sclera: Conjunctivae normal.      Pupils: Pupils are equal, round, and reactive to light.   Neck:      Thyroid: No thyromegaly.      Vascular: No JVD.   Cardiovascular:      Rate and Rhythm: Normal rate and regular rhythm.      Heart sounds: Normal heart sounds, S1 normal and S2 normal. No murmur heard.     No friction rub. No gallop.   Pulmonary:      Effort: Pulmonary effort is normal.      Breath sounds: Normal breath sounds.   Abdominal:      General: Bowel sounds are normal. There is no distension.      Palpations: Abdomen is soft. There is no mass.      Tenderness: There is no abdominal tenderness.   Musculoskeletal:         General: Normal range  of motion.      Cervical back: Full passive range of motion without pain, normal range of motion and neck supple.   Skin:     General: Skin is warm and dry.      Capillary Refill: Capillary refill takes less than 2 seconds.   Neurological:      General: No focal deficit present.      Mental Status: She is alert and oriented to person, place, and time. Mental status is at baseline.      GCS: GCS eye subscore is 4. GCS verbal subscore is 5. GCS motor subscore is 6.      Cranial Nerves: No cranial nerve deficit.      Sensory: Sensation is intact.      Motor: Motor function is intact.   Psychiatric:         Behavior: Behavior normal. Behavior is cooperative.     Significant Labs: All pertinent labs within the past 24 hours have been reviewed.  CBC:   Recent Labs   Lab 01/08/25  2103 01/09/25  0509 01/10/25  0431   WBC 7.98 6.92 5.78   HGB 12.7 11.7* 11.4*   HCT 38.5 35.4* 34.5*    305 319     CMP:   Recent Labs   Lab 01/08/25  2103 01/09/25  0509 01/10/25  0431    134* 135*   K 4.0 3.7 3.7    106 105   CO2 20* 20* 22*   GLU 96 92 89   BUN 7 6 11   CREATININE 0.8 0.8 0.8   CALCIUM 9.3 8.9 8.8   PROT 7.7  --   --    ALBUMIN 4.1  --   --    BILITOT 0.3  --   --    ALKPHOS 77  --   --    AST 15  --   --    ALT 15  --   --    ANIONGAP 10 8 8       Significant Imaging: I have reviewed all pertinent imaging results/findings within the past 24 hours.

## 2025-01-10 NOTE — PROGRESS NOTES
[facility]  Progress Note    Patient Name: Alanna Hammer  MRN: [id number]  Patient Class: IP- Inpatient   Admission Date: 1/8/2025  Length of Stay: 0 days  Attending Physician: Shane Hirsch MD  Primary Care Provider: Cristy Valentin FNP-C        Subjective     Principal Problem:Eyelid abscess, left        HPI:  Arti Hammer is a 24 year old female with a past medical history of autism and GERD who presented to the ED with a complaint of left eye lid swelling. She states that she had an abrasion to the left eyelid that she started picking. She started having surrounding erythema and swelling with no drainage. She was seen in the ED and placed on Bactrim and Bactroban ointment. She had a needle I&D with minimal drainage. She took 3 doses of Bactrim and states that the eyelid has become much more swollen and red, with intermittent pain. Her mother has tried squeezing the area with no success. She denies pain with eye movement and denies visual disturbance. She denies other complaint. ED work up included a CBC and CMP which were unremarkable. CT Maxillofacial confirmed a left sided blepharitis with a small abscess. The site was I&D' ed in the ED. She was initiated on Zosyn. Hospital Medicine consulted for admission and further management.    Overview/Hospital Course:  Patient is a 24 year old female who was admitted with abscess over the left upper eyelid. Patient had taken three days of Bactrim at home without improvement. Patient had bedside I&D in the Ed. Patient was admitted, started on Zosyn.  Eye swelling is improving. Cultures so far no growth.     Interval History: Patient is feeling better. Swelling on the left eye is somewhat improved and more localized. Patient is pain free and afebrile.     Review of Systems  Objective:     Vital Signs (Most Recent):  Temp: 98 °F (36.7 °C) (01/10/25 1131)  Pulse: 81 (01/10/25 1131)  Resp: 16 (01/10/25 1131)  BP:  (!) 111/58 (01/10/25 1131)  SpO2: 100 % (01/10/25 1131) Vital Signs (24h Range):  Temp:  [97.9 °F (36.6 °C)-98.4 °F (36.9 °C)] 98 °F (36.7 °C)  Pulse:  [64-83] 81  Resp:  [16-18] 16  SpO2:  [98 %-100 %] 100 %  BP: ()/(50-74) 111/58     Weight: 75.8 kg (167 lb 1.7 oz)  Body mass index is 27.81 kg/m².    Intake/Output Summary (Last 24 hours) at 1/10/2025 1312  Last data filed at 1/10/2025 0512  Gross per 24 hour   Intake 259.39 ml   Output --   Net 259.39 ml         Physical Exam  Vitals and nursing note reviewed.   Constitutional:       General: She is awake. She is not in acute distress.     Appearance: Normal appearance. She is well-developed. She is not ill-appearing.   HENT:      Head: Normocephalic and atraumatic.      Mouth/Throat:      Lips: Pink.      Mouth: Mucous membranes are moist.   Eyes:      General: left upper eye lid swelling is improved. Swelling is now more localized to the abscess area. there is erythema and small packing on      Conjunctiva/sclera: Conjunctivae normal.      Pupils: Pupils are equal, round, and reactive to light.   Neck:      Thyroid: No thyromegaly.      Vascular: No JVD.   Cardiovascular:      Rate and Rhythm: Normal rate and regular rhythm.      Heart sounds: Normal heart sounds, S1 normal and S2 normal. No murmur heard.     No friction rub. No gallop.   Pulmonary:      Effort: Pulmonary effort is normal.      Breath sounds: Normal breath sounds.   Abdominal:      General: Bowel sounds are normal. There is no distension.      Palpations: Abdomen is soft. There is no mass.      Tenderness: There is no abdominal tenderness.   Musculoskeletal:         General: Normal range of motion.      Cervical back: Full passive range of motion without pain, normal range of motion and neck supple.   Skin:     General: Skin is warm and dry.      Capillary Refill: Capillary refill takes less than 2 seconds.   Neurological:      General: No focal deficit present.      Mental Status: She is  alert and oriented to person, place, and time. Mental status is at baseline.      GCS: GCS eye subscore is 4. GCS verbal subscore is 5. GCS motor subscore is 6.      Cranial Nerves: No cranial nerve deficit.      Sensory: Sensation is intact.      Motor: Motor function is intact.   Psychiatric:         Behavior: Behavior normal. Behavior is cooperative.     Significant Labs: All pertinent labs within the past 24 hours have been reviewed.  CBC:   Recent Labs   Lab 01/08/25 2103 01/09/25 0509 01/10/25  0431   WBC 7.98 6.92 5.78   HGB 12.7 11.7* 11.4*   HCT 38.5 35.4* 34.5*    305 319     CMP:   Recent Labs   Lab 01/08/25  2103 01/09/25  0509 01/10/25  0431    134* 135*   K 4.0 3.7 3.7    106 105   CO2 20* 20* 22*   GLU 96 92 89   BUN 7 6 11   CREATININE 0.8 0.8 0.8   CALCIUM 9.3 8.9 8.8   PROT 7.7  --   --    ALBUMIN 4.1  --   --    BILITOT 0.3  --   --    ALKPHOS 77  --   --    AST 15  --   --    ALT 15  --   --    ANIONGAP 10 8 8       Significant Imaging: I have reviewed all pertinent imaging results/findings within the past 24 hours.    Assessment and Plan     * Eyelid abscess, left  Blepharitis noted to left eyelid with abscess  I&D'ed with needle previously, I&D'ed 1/8 in ED  cont Zosyn for one more day  Warm compresses  Pain meds PRN  Follow culture     Should be able to DC tomorrow with PO Augmentin       VTE Risk Mitigation (From admission, onward)           Ordered     IP VTE LOW RISK PATIENT  Once         01/08/25 2251     Place sequential compression device  Until discontinued         01/08/25 2251                    Discharge Planning   SHANON: 1/11/2025     Code Status: Full Code   Medical Readiness for Discharge Date:   Discharge Plan A: Home                        Shane Hirsch MD  Department of Hospital Medicine   [facility] - Med/Surg

## 2025-01-10 NOTE — ASSESSMENT & PLAN NOTE
Blepharitis noted to left eyelid with abscess  I&D'ed with needle previously, I&D'ed 1/8 in ED  cont Zosyn for one more day  Warm compresses  Pain meds PRN  Follow culture     Should be able to DC tomorrow with PO Augmentin

## 2025-01-10 NOTE — PROGRESS NOTES
Pharmacist Renal Dose Adjustment Note    Alanna Hammer is a 24 y.o. female being treated with the medication piperacillin/tazobactam 4.5 g    Patient Data:    Vital Signs (Most Recent):  Temp: 98 °F (36.7 °C) (01/10/25 1131)  Pulse: 81 (01/10/25 1131)  Resp: 16 (01/10/25 1131)  BP: (!) 111/58 (01/10/25 1131)  SpO2: 100 % (01/10/25 1131) Vital Signs (72h Range):  Temp:  [97.9 °F (36.6 °C)-98.7 °F (37.1 °C)]   Pulse:  [64-88]   Resp:  [16-18]   BP: ()/(50-74)   SpO2:  [96 %-100 %]      Recent Labs   Lab 01/08/25  2103 01/09/25  0509 01/10/25  0431   CREATININE 0.8 0.8 0.8     Serum creatinine: 0.8 mg/dL 01/10/25 0431  Estimated creatinine clearance: 110.4 mL/min    Medication:Piperacillin/tazobactam dose: 4.5 g frequency Q6H will be changed to medication:piperacillin/tazobactam dose:4.5 g frequency:Q8H    Pharmacist's Name: Rosy Mallory  Pharmacist's Extension: [phone]

## 2025-01-10 NOTE — PLAN OF CARE
Problem: Adult Inpatient Plan of Care  Goal: Plan of Care Review  Outcome: Progressing  Goal: Patient-Specific Goal (Individualized)  Outcome: Progressing  Goal: Absence of Hospital-Acquired Illness or Injury  Outcome: Progressing  Goal: Optimal Comfort and Wellbeing  Outcome: Progressing  Goal: Readiness for Transition of Care  Outcome: Progressing    POC/Meds reviewed with pt. Pt verbalized complete understanding. Two hour pt rounding maintained throughout shift. All fall precautions maintained. Repositions self. Slip resistant socks maintained throughout shift while out of bed. Bed in lowest position, locked, and call light in reach. Side rails up x2. No complaints at this time. Will continue to monitor.     details…

## 2025-01-11 VITALS
RESPIRATION RATE: 17 BRPM | DIASTOLIC BLOOD PRESSURE: 58 MMHG | HEIGHT: 65 IN | HEART RATE: 93 BPM | TEMPERATURE: 99 F | WEIGHT: 167.13 LBS | OXYGEN SATURATION: 100 % | BODY MASS INDEX: 27.85 KG/M2 | SYSTOLIC BLOOD PRESSURE: 118 MMHG

## 2025-01-11 LAB
ANION GAP SERPL CALC-SCNC: 9 MMOL/L (ref 8–16)
BASOPHILS # BLD AUTO: 0.06 K/UL (ref 0–0.2)
BASOPHILS NFR BLD: 1 % (ref 0–1.9)
BUN SERPL-MCNC: 10 MG/DL (ref 6–20)
CALCIUM SERPL-MCNC: 8.5 MG/DL (ref 8.7–10.5)
CHLORIDE SERPL-SCNC: 107 MMOL/L (ref 95–110)
CO2 SERPL-SCNC: 22 MMOL/L (ref 23–29)
CREAT SERPL-MCNC: 0.7 MG/DL (ref 0.5–1.4)
DIFFERENTIAL METHOD BLD: ABNORMAL
EOSINOPHIL # BLD AUTO: 0.8 K/UL (ref 0–0.5)
EOSINOPHIL NFR BLD: 13.6 % (ref 0–8)
ERYTHROCYTE [DISTWIDTH] IN BLOOD BY AUTOMATED COUNT: 14.3 % (ref 11.5–14.5)
EST. GFR  (NO RACE VARIABLE): >60 ML/MIN/1.73 M^2
GLUCOSE SERPL-MCNC: 92 MG/DL (ref 70–110)
HCT VFR BLD AUTO: 33.4 % (ref 37–48.5)
HGB BLD-MCNC: 11 G/DL (ref 12–16)
IMM GRANULOCYTES # BLD AUTO: 0.01 K/UL (ref 0–0.04)
IMM GRANULOCYTES NFR BLD AUTO: 0.2 % (ref 0–0.5)
LYMPHOCYTES # BLD AUTO: 2.4 K/UL (ref 1–4.8)
LYMPHOCYTES NFR BLD: 40.9 % (ref 18–48)
MAGNESIUM SERPL-MCNC: 1.9 MG/DL (ref 1.6–2.6)
MCH RBC QN AUTO: 28.5 PG (ref 27–31)
MCHC RBC AUTO-ENTMCNC: 32.9 G/DL (ref 32–36)
MCV RBC AUTO: 87 FL (ref 82–98)
MONOCYTES # BLD AUTO: 0.5 K/UL (ref 0.3–1)
MONOCYTES NFR BLD: 8.5 % (ref 4–15)
NEUTROPHILS # BLD AUTO: 2.1 K/UL (ref 1.8–7.7)
NEUTROPHILS NFR BLD: 35.8 % (ref 38–73)
NRBC BLD-RTO: 0 /100 WBC
PHOSPHATE SERPL-MCNC: 4 MG/DL (ref 2.7–4.5)
PLATELET # BLD AUTO: 315 K/UL (ref 150–450)
PMV BLD AUTO: 10.2 FL (ref 9.2–12.9)
POTASSIUM SERPL-SCNC: 3.7 MMOL/L (ref 3.5–5.1)
RBC # BLD AUTO: 3.86 M/UL (ref 4–5.4)
SODIUM SERPL-SCNC: 138 MMOL/L (ref 136–145)
WBC # BLD AUTO: 5.75 K/UL (ref 3.9–12.7)

## 2025-01-11 PROCEDURE — 80048 BASIC METABOLIC PNL TOTAL CA: CPT | Performed by: NURSE PRACTITIONER

## 2025-01-11 PROCEDURE — 25000003 PHARM REV CODE 250: Performed by: INTERNAL MEDICINE

## 2025-01-11 PROCEDURE — 85025 COMPLETE CBC W/AUTO DIFF WBC: CPT | Performed by: NURSE PRACTITIONER

## 2025-01-11 PROCEDURE — 84100 ASSAY OF PHOSPHORUS: CPT | Performed by: NURSE PRACTITIONER

## 2025-01-11 PROCEDURE — G0378 HOSPITAL OBSERVATION PER HR: HCPCS

## 2025-01-11 PROCEDURE — 63600175 PHARM REV CODE 636 W HCPCS: Performed by: INTERNAL MEDICINE

## 2025-01-11 PROCEDURE — 96366 THER/PROPH/DIAG IV INF ADDON: CPT

## 2025-01-11 PROCEDURE — 25000003 PHARM REV CODE 250: Performed by: NURSE PRACTITIONER

## 2025-01-11 PROCEDURE — 83735 ASSAY OF MAGNESIUM: CPT | Performed by: NURSE PRACTITIONER

## 2025-01-11 PROCEDURE — 36415 COLL VENOUS BLD VENIPUNCTURE: CPT | Performed by: NURSE PRACTITIONER

## 2025-01-11 RX ORDER — AMOXICILLIN AND CLAVULANATE POTASSIUM 875; 125 MG/1; MG/1
1 TABLET, FILM COATED ORAL EVERY 12 HOURS
Qty: 12 TABLET | Refills: 0 | Status: SHIPPED | OUTPATIENT
Start: 2025-01-11 | End: 2025-01-15

## 2025-01-11 RX ADMIN — PIPERACILLIN SODIUM AND TAZOBACTAM SODIUM 4.5 G: 4; .5 INJECTION, POWDER, LYOPHILIZED, FOR SOLUTION INTRAVENOUS at 01:01

## 2025-01-11 RX ADMIN — PIPERACILLIN SODIUM AND TAZOBACTAM SODIUM 4.5 G: 4; .5 INJECTION, POWDER, LYOPHILIZED, FOR SOLUTION INTRAVENOUS at 09:01

## 2025-01-11 RX ADMIN — FAMOTIDINE 20 MG: 20 TABLET, FILM COATED ORAL at 09:01

## 2025-01-11 RX ADMIN — POLYETHYLENE GLYCOL (3350) 17 G: 17 POWDER, FOR SOLUTION ORAL at 09:01

## 2025-01-11 NOTE — PLAN OF CARE
Patient stated packing fell out of her eyelid. Left eyelid is mildly swollen and red but no drainage noted. Dr. Amaral notified. No new orders given. Instructed pt on discharge to keep area clean with soap and water, pat dry. Cover with band-aid. Pt verbalized understanding.

## 2025-01-11 NOTE — PLAN OF CARE
Pt cleared from CM to Home.  No Needs to be addressed.    PCP appointment made and added to AVS.       01/11/25 1042   Final Note   Assessment Type Final Discharge Note   Anticipated Discharge Disposition Home   What phone number can be called within the next 1-3 days to see how you are doing after discharge? [phone]   Post-Acute Status   Post-Acute Authorization Other   Other Status No Post-Acute Service Needs   Discharge Delays None known at this time

## 2025-01-11 NOTE — DISCHARGE SUMMARY
[facility]  Discharge Summary      Patient Name: Alanna Hammer  MRN: [id number]  [facility]: [id number]  Patient Class: IP- Inpatient  Admission Date: 1/8/2025  Hospital Length of Stay: 1 days  Discharge Date and Time:  01/11/2025 9:58 AM  Attending Physician: Arleth Amaral MD   Discharging Provider: Arleth Amaral MD, MD  Primary Care Provider: Cristy Valentin FNP-C    Primary Care Team: Networked reference to record PCT     HPI:   Arti Hammer is a 24 year old female with a past medical history of autism and GERD who presented to the ED with a complaint of left eye lid swelling. She states that she had an abrasion to the left eyelid that she started picking. She started having surrounding erythema and swelling with no drainage. She was seen in the ED and placed on Bactrim and Bactroban ointment. She had a needle I&D with minimal drainage. She took 3 doses of Bactrim and states that the eyelid has become much more swollen and red, with intermittent pain. Her mother has tried squeezing the area with no success. She denies pain with eye movement and denies visual disturbance. She denies other complaint. ED work up included a CBC and CMP which were unremarkable. CT Maxillofacial confirmed a left sided blepharitis with a small abscess. The site was I&D' ed in the ED. She was initiated on Zosyn. Hospital Medicine consulted for admission and further management.    * No surgery found *      Hospital Course:   Patient is a 24 year old female who was admitted with abscess over the left upper eyelid. Patient had taken three days of Bactrim at home without improvement. Patient had bedside I&D in the Ed. Patient was admitted, started on Zosyn.  Eye swelling improved. The patient was discharged home in stable condition.     Goals of Care Treatment Preferences:  Code Status: Full Code      SDOH Screening:  The patient declined to be screened for utility difficulties, food  insecurity, transport difficulties, housing insecurity, and interpersonal safety, so no concerns could be identified this admission.     Consults:     * Eyelid abscess, left  F/U outpatient      Final Active Diagnoses:    Diagnosis Date Noted POA    PRINCIPAL PROBLEM:  Eyelid abscess, left [H00.036] 01/08/2025 Yes      Problems Resolved During this Admission:       Discharged Condition: stable    Disposition: Home or Self Care    Follow Up:   Follow-up Information       Cristy Valentin FNP-C. Schedule an appointment as soon as possible for a visit.    Specialty: Family Medicine  Why: Office to contact you to schedule follow up appointment.  If you don't hear from them within the next couple of days, please contact office to schedule follow up appointment.  Contact information:  [address] [facility] [id number] [phone]                           Patient Instructions:      Diet Adult Regular     Activity as tolerated       Significant Diagnostic Studies: N/A    Pending Diagnostic Studies:       None           Medications:  Reconciled Home Medications:      Medication List        START taking these medications      amoxicillin-clavulanate 875-125mg 875-125 mg per tablet  Commonly known as: AUGMENTIN  Take 1 tablet by mouth every 12 (twelve) hours. for 6 days            CONTINUE taking these medications      lactase 3,000 unit tablet  Commonly known as: LACTAID  Take 1 tablet by mouth 3 (three) times daily with meals.     PROBIOTIC ACIDOPHILUS ORAL  Take 1 tablet by mouth once daily.            STOP taking these medications      mupirocin 2 % ointment  Commonly known as: BACTROBAN     sulfamethoxazole-trimethoprim 800-160mg 800-160 mg Tab  Commonly known as: BACTRIM DS              Indwelling Lines/Drains at time of discharge:   Lines/Drains/Airways       None                   Time spent on the discharge of patient: 35 minutes         Arleth Amaral MD, MD  Department of Hospital Medicine  [facility]  East - Med/Surg

## 2025-01-11 NOTE — PLAN OF CARE
Plan of care reviewed with patient, verbalized understanding. IV intact and patent with no s/s infection or infiltration noted to site. ABX given as ordered. Meds given per MAR. Ambulated to bathroom with x1 assist. No complaints of pain or discomfort. IS at bedside and encouraged use. NAD noted. Purposeful q2hr rounding done. Safety maintained with side rails up x3, bed wheels locked, bed in lowest position, bed alarm set, slip resistant socks maintained, and call light with in reach of patient. Patient educated to call for assistance when needed, verbalized understanding. Patient remains free from falls. No further needs expressed at this time. Will continue to monitor.     Problem: Adult Inpatient Plan of Care  Goal: Plan of Care Review  Outcome: Progressing  Goal: Patient-Specific Goal (Individualized)  Outcome: Progressing  Goal: Absence of Hospital-Acquired Illness or Injury  Outcome: Progressing  Goal: Optimal Comfort and Wellbeing  Outcome: Progressing  Goal: Readiness for Transition of Care  Outcome: Progressing     Problem: Pain Acute  Goal: Optimal Pain Control and Function  Outcome: Progressing     Problem: Infection  Goal: Absence of Infection Signs and Symptoms  Outcome: Progressing

## 2025-01-11 NOTE — PLAN OF CARE
Problem: Adult Inpatient Plan of Care  Goal: Plan of Care Review  Outcome: Met  Goal: Patient-Specific Goal (Individualized)  Outcome: Met  Goal: Absence of Hospital-Acquired Illness or Injury  Outcome: Met  Goal: Optimal Comfort and Wellbeing  Outcome: Met  Goal: Readiness for Transition of Care  Outcome: Met     Problem: Pain Acute  Goal: Optimal Pain Control and Function  Outcome: Met     Problem: Infection  Goal: Absence of Infection Signs and Symptoms  Outcome: Met

## 2025-01-11 NOTE — NURSING
Patient discharged in stable condition. No complaint of pain or discomfort. Discharge paperwork given. Patient refused wheelchair. Discharged to Mom.

## 2025-01-12 LAB
BACTERIA SPEC AEROBE CULT: ABNORMAL
BACTERIA SPEC AEROBE CULT: NO GROWTH

## 2025-01-13 ENCOUNTER — TELEPHONE (OUTPATIENT)
Dept: FAMILY MEDICINE | Facility: CLINIC | Age: 25
End: 2025-01-13
Payer: MEDICAID

## 2025-01-13 NOTE — TELEPHONE ENCOUNTER
----- Message from  Manju sent at 1/10/2025 10:46 AM CST -----  Regarding: Hospital follow up  Good morning.    Pt possibly discharging home today from [facility].  No available appointments until 2/12/25.  Please contact pt to schedule follow up appointment.    Thank you,  Manju

## 2025-01-15 ENCOUNTER — OFFICE VISIT (OUTPATIENT)
Dept: FAMILY MEDICINE | Facility: CLINIC | Age: 25
End: 2025-01-15
Payer: MEDICAID

## 2025-01-15 VITALS
DIASTOLIC BLOOD PRESSURE: 66 MMHG | SYSTOLIC BLOOD PRESSURE: 110 MMHG | BODY MASS INDEX: 27.99 KG/M2 | HEART RATE: 66 BPM | WEIGHT: 168 LBS | HEIGHT: 65 IN | TEMPERATURE: 98 F | OXYGEN SATURATION: 99 %

## 2025-01-15 DIAGNOSIS — Z22.322 MRSA (METHICILLIN RESISTANT STAPH AUREUS) CULTURE POSITIVE: ICD-10-CM

## 2025-01-15 DIAGNOSIS — Z09 HOSPITAL DISCHARGE FOLLOW-UP: Primary | ICD-10-CM

## 2025-01-15 DIAGNOSIS — F41.0 PANIC: ICD-10-CM

## 2025-01-15 PROCEDURE — 1160F RVW MEDS BY RX/DR IN RCRD: CPT | Mod: CPTII,,, | Performed by: NURSE PRACTITIONER

## 2025-01-15 PROCEDURE — 1111F DSCHRG MED/CURRENT MED MERGE: CPT | Mod: CPTII,,, | Performed by: NURSE PRACTITIONER

## 2025-01-15 PROCEDURE — 99214 OFFICE O/P EST MOD 30 MIN: CPT | Mod: S$PBB,,, | Performed by: NURSE PRACTITIONER

## 2025-01-15 PROCEDURE — 1159F MED LIST DOCD IN RCRD: CPT | Mod: CPTII,,, | Performed by: NURSE PRACTITIONER

## 2025-01-15 PROCEDURE — 99213 OFFICE O/P EST LOW 20 MIN: CPT | Mod: PBBFAC,PN | Performed by: NURSE PRACTITIONER

## 2025-01-15 PROCEDURE — 3074F SYST BP LT 130 MM HG: CPT | Mod: CPTII,,, | Performed by: NURSE PRACTITIONER

## 2025-01-15 PROCEDURE — 3078F DIAST BP <80 MM HG: CPT | Mod: CPTII,,, | Performed by: NURSE PRACTITIONER

## 2025-01-15 PROCEDURE — 99999 PR PBB SHADOW E&M-EST. PATIENT-LVL III: CPT | Mod: PBBFAC,,, | Performed by: NURSE PRACTITIONER

## 2025-01-15 RX ORDER — MUPIROCIN 20 MG/G
OINTMENT TOPICAL 3 TIMES DAILY
Qty: 22 G | Refills: 1 | Status: SHIPPED | OUTPATIENT
Start: 2025-01-15 | End: 2025-01-25

## 2025-01-15 RX ORDER — POLYETHYLENE GLYCOL 3350 17 G/17G
17 POWDER, FOR SOLUTION ORAL DAILY
COMMUNITY

## 2025-01-15 RX ORDER — DOXYCYCLINE HYCLATE 100 MG
100 TABLET ORAL 2 TIMES DAILY
Qty: 20 TABLET | Refills: 0 | Status: SHIPPED | OUTPATIENT
Start: 2025-01-15

## 2025-01-15 RX ORDER — CLONAZEPAM 0.5 MG/1
TABLET ORAL
Qty: 1 TABLET | Refills: 0 | Status: SHIPPED | OUTPATIENT
Start: 2025-01-15

## 2025-01-15 NOTE — PROGRESS NOTES
SUBJECTIVE:    Patient ID: Alanna Hammer is a 24 y.o. female.    Chief Complaint: hosp. f/u and abscess left eyelid    History of Present Illness    CHIEF COMPLAINT:  Patient presents for follow-up of a skin infection near the eye, diagnosed as MRSA (Methicillin-Resistant Staphylococcus Aureus).    HPI:  Patient previously visited the emergency room for a skin infection near the eye, initially treated with Bactrim and an eye ointment. The infection persisted and was identified as MRSA. Patient has been taking Augmentin for approximately 4 days, with 2 days of treatment remaining. The infection has not fully resolved, although some improvement occurred, likely due to IV antibiotics administered during a hospital stay. Recent pictures indicate the infection improved on Monday but has since slightly worsened, suggesting the effects of the IV antibiotics may be diminishing. Her sister recently had a similar abscess on her cheek, which rapidly progressed and required treatment at an urgent care facility.    MEDICATIONS:  Patient is currently on Augmentin with 2 days left of the current course for MRSA infection. She is also using Bactroban ointment for nasal application and taking probiotic gummies.    MEDICAL HISTORY:  Patient has a history of recent MRSA (Methicillin Resistant Staph Infection) in the eye area. Patient has an upcoming pap smear scheduled.     FAMILY HISTORY:  Family history is significant for mother with breast cancer diagnosed 8 years ago, who underwent a lumpectomy. The cancer has recently returned and metastasized. Her sister has a suspected breast cyst.    TEST RESULTS:  Patient's MRSA test was positive, confirming Methicillin Resistant Staph Infection.    IMAGING:  Patient has undergone multiple mammograms over the past 16 years. She has also had ultrasound and MRI imaging. Her breast imaging showed no tumor and a TC score over 19.    ALLERGIES:  Patient's sister, Yesika, is allergic  to Doxycycline.      ROS:  General: -fever, -chills, -fatigue, -weight gain, -weight loss  Eyes: -vision changes, -redness, -discharge  ENT: -ear pain, -nasal congestion, -sore throat  Cardiovascular: -chest pain, -palpitations, -lower extremity edema  Respiratory: -cough, -shortness of breath  Gastrointestinal: -abdominal pain, -nausea, -vomiting, -diarrhea, -constipation, -blood in stool  Genitourinary: -dysuria, -hematuria, -frequency  Musculoskeletal: -joint pain, -muscle pain  Skin: +rash, -lesion  Neurological: -headache, -dizziness, -numbness, -tingling  Psychiatric: -anxiety, -depression, -sleep difficulty       Review of Systems    Admit Date: 1/9/25  Discharge Date: 1/11/25  Discharge Facility: Hospital      Family and/or Caretaker present at visit? Yes  Medication Reconciliation:  Medications changed/added/deleted. Augmentin  New Prescriptions filled after discharge: yes  Discharge summary reviewed:  yes  Diagnostic tests reviewed/disposition: I have reviewed all completed as well as pending diagnostic tests at the time of discharge  Disease/illness education: completed  Follow up appointments scheduled:  not applicable             Follow up labs/tests ordered:   no  Home Health ordered on discharge: Patient does not have home health established from hospital visit.  They do not need home health.  If needed, we will set up home health for the patient  Home Health company name: n/a  Establishment or re-establishment of referral orders for community resources: No other necessary community resources  DME ordered at discharge:   no  How patient is feeling since discharge from the hospital?  improved     Discussion with other health care providers: No discussion with other health care providers necessary  Patient follow up phone call documented on separate encounter.    Admission on 01/08/2025, Discharged on 01/11/2025   Component Date Value Ref Range Status    WBC 01/08/2025 7.98  3.90 - 12.70 K/uL Final     RBC 01/08/2025 4.44  4.00 - 5.40 M/uL Final    Hemoglobin 01/08/2025 12.7  12.0 - 16.0 g/dL Final    Hematocrit 01/08/2025 38.5  37.0 - 48.5 % Final    MCV 01/08/2025 87  82 - 98 fL Final    MCH 01/08/2025 28.6  27.0 - 31.0 pg Final    MCHC 01/08/2025 33.0  32.0 - 36.0 g/dL Final    RDW 01/08/2025 14.2  11.5 - 14.5 % Final    Platelets 01/08/2025 364  150 - 450 K/uL Final    MPV 01/08/2025 10.2  9.2 - 12.9 fL Final    Immature Granulocytes 01/08/2025 0.4  0.0 - 0.5 % Final    Gran # (ANC) 01/08/2025 4.6  1.8 - 7.7 K/uL Final    Immature Grans (Abs) 01/08/2025 0.03  0.00 - 0.04 K/uL Final    Lymph # 01/08/2025 1.8  1.0 - 4.8 K/uL Final    Mono # 01/08/2025 0.7  0.3 - 1.0 K/uL Final    Eos # 01/08/2025 0.7 (H)  0.0 - 0.5 K/uL Final    Baso # 01/08/2025 0.06  0.00 - 0.20 K/uL Final    nRBC 01/08/2025 0  0 /100 WBC Final    Gran % 01/08/2025 58.1  38.0 - 73.0 % Final    Lymph % 01/08/2025 22.8  18.0 - 48.0 % Final    Mono % 01/08/2025 9.0  4.0 - 15.0 % Final    Eosinophil % 01/08/2025 8.9 (H)  0.0 - 8.0 % Final    Basophil % 01/08/2025 0.8  0.0 - 1.9 % Final    Differential Method 01/08/2025 Automated   Final    Sodium 01/08/2025 136  136 - 145 mmol/L Final    Potassium 01/08/2025 4.0  3.5 - 5.1 mmol/L Final    Chloride 01/08/2025 106  95 - 110 mmol/L Final    CO2 01/08/2025 20 (L)  23 - 29 mmol/L Final    Glucose 01/08/2025 96  70 - 110 mg/dL Final    BUN 01/08/2025 7  6 - 20 mg/dL Final    Creatinine 01/08/2025 0.8  0.5 - 1.4 mg/dL Final    Calcium 01/08/2025 9.3  8.7 - 10.5 mg/dL Final    Total Protein 01/08/2025 7.7  6.0 - 8.4 g/dL Final    Albumin 01/08/2025 4.1  3.5 - 5.2 g/dL Final    Total Bilirubin 01/08/2025 0.3  0.1 - 1.0 mg/dL Final    Alkaline Phosphatase 01/08/2025 77  40 - 150 U/L Final    AST 01/08/2025 15  10 - 40 U/L Final    ALT 01/08/2025 15  10 - 44 U/L Final    eGFR 01/08/2025 >60  >60 mL/min/1.73 m^2 Final    Anion Gap 01/08/2025 10  8 - 16 mmol/L Final    Aerobic Bacterial Culture 01/09/2025 No  growth   Final    Sodium 01/09/2025 134 (L)  136 - 145 mmol/L Final    Potassium 01/09/2025 3.7  3.5 - 5.1 mmol/L Final    Chloride 01/09/2025 106  95 - 110 mmol/L Final    CO2 01/09/2025 20 (L)  23 - 29 mmol/L Final    Glucose 01/09/2025 92  70 - 110 mg/dL Final    BUN 01/09/2025 6  6 - 20 mg/dL Final    Creatinine 01/09/2025 0.8  0.5 - 1.4 mg/dL Final    Calcium 01/09/2025 8.9  8.7 - 10.5 mg/dL Final    Anion Gap 01/09/2025 8  8 - 16 mmol/L Final    eGFR 01/09/2025 >60  >60 mL/min/1.73 m^2 Final    Magnesium 01/09/2025 1.9  1.6 - 2.6 mg/dL Final    Phosphorus 01/09/2025 3.9  2.7 - 4.5 mg/dL Final    WBC 01/09/2025 6.92  3.90 - 12.70 K/uL Final    RBC 01/09/2025 4.17  4.00 - 5.40 M/uL Final    Hemoglobin 01/09/2025 11.7 (L)  12.0 - 16.0 g/dL Final    Hematocrit 01/09/2025 35.4 (L)  37.0 - 48.5 % Final    MCV 01/09/2025 85  82 - 98 fL Final    MCH 01/09/2025 28.1  27.0 - 31.0 pg Final    MCHC 01/09/2025 33.1  32.0 - 36.0 g/dL Final    RDW 01/09/2025 14.3  11.5 - 14.5 % Final    Platelets 01/09/2025 305  150 - 450 K/uL Final    MPV 01/09/2025 10.0  9.2 - 12.9 fL Final    Immature Granulocytes 01/09/2025 0.3  0.0 - 0.5 % Final    Gran # (ANC) 01/09/2025 3.5  1.8 - 7.7 K/uL Final    Immature Grans (Abs) 01/09/2025 0.02  0.00 - 0.04 K/uL Final    Lymph # 01/09/2025 1.9  1.0 - 4.8 K/uL Final    Mono # 01/09/2025 0.7  0.3 - 1.0 K/uL Final    Eos # 01/09/2025 0.7 (H)  0.0 - 0.5 K/uL Final    Baso # 01/09/2025 0.08  0.00 - 0.20 K/uL Final    nRBC 01/09/2025 0  0 /100 WBC Final    Gran % 01/09/2025 50.1  38.0 - 73.0 % Final    Lymph % 01/09/2025 27.9  18.0 - 48.0 % Final    Mono % 01/09/2025 9.8  4.0 - 15.0 % Final    Eosinophil % 01/09/2025 10.7 (H)  0.0 - 8.0 % Final    Basophil % 01/09/2025 1.2  0.0 - 1.9 % Final    Differential Method 01/09/2025 Automated   Final    Aerobic Bacterial Culture 01/09/2025  (A)   Final                    Value:METHICILLIN RESISTANT STAPHYLOCOCCUS AUREUS  Few      Sodium 01/10/2025 135 (L)   136 - 145 mmol/L Final    Potassium 01/10/2025 3.7  3.5 - 5.1 mmol/L Final    Chloride 01/10/2025 105  95 - 110 mmol/L Final    CO2 01/10/2025 22 (L)  23 - 29 mmol/L Final    Glucose 01/10/2025 89  70 - 110 mg/dL Final    BUN 01/10/2025 11  6 - 20 mg/dL Final    Creatinine 01/10/2025 0.8  0.5 - 1.4 mg/dL Final    Calcium 01/10/2025 8.8  8.7 - 10.5 mg/dL Final    Anion Gap 01/10/2025 8  8 - 16 mmol/L Final    eGFR 01/10/2025 >60  >60 mL/min/1.73 m^2 Final    Magnesium 01/10/2025 1.8  1.6 - 2.6 mg/dL Final    Phosphorus 01/10/2025 3.9  2.7 - 4.5 mg/dL Final    WBC 01/10/2025 5.78  3.90 - 12.70 K/uL Final    RBC 01/10/2025 3.97 (L)  4.00 - 5.40 M/uL Final    Hemoglobin 01/10/2025 11.4 (L)  12.0 - 16.0 g/dL Final    Hematocrit 01/10/2025 34.5 (L)  37.0 - 48.5 % Final    MCV 01/10/2025 87  82 - 98 fL Final    MCH 01/10/2025 28.7  27.0 - 31.0 pg Final    MCHC 01/10/2025 33.0  32.0 - 36.0 g/dL Final    RDW 01/10/2025 14.4  11.5 - 14.5 % Final    Platelets 01/10/2025 319  150 - 450 K/uL Final    MPV 01/10/2025 10.4  9.2 - 12.9 fL Final    Immature Granulocytes 01/10/2025 0.2  0.0 - 0.5 % Final    Gran # (ANC) 01/10/2025 2.2  1.8 - 7.7 K/uL Final    Immature Grans (Abs) 01/10/2025 0.01  0.00 - 0.04 K/uL Final    Lymph # 01/10/2025 2.1  1.0 - 4.8 K/uL Final    Mono # 01/10/2025 0.6  0.3 - 1.0 K/uL Final    Eos # 01/10/2025 0.9 (H)  0.0 - 0.5 K/uL Final    Baso # 01/10/2025 0.06  0.00 - 0.20 K/uL Final    nRBC 01/10/2025 0  0 /100 WBC Final    Gran % 01/10/2025 37.7 (L)  38.0 - 73.0 % Final    Lymph % 01/10/2025 36.0  18.0 - 48.0 % Final    Mono % 01/10/2025 9.7  4.0 - 15.0 % Final    Eosinophil % 01/10/2025 15.4 (H)  0.0 - 8.0 % Final    Basophil % 01/10/2025 1.0  0.0 - 1.9 % Final    Differential Method 01/10/2025 Automated   Final    Sodium 01/11/2025 138  136 - 145 mmol/L Final    Potassium 01/11/2025 3.7  3.5 - 5.1 mmol/L Final    Chloride 01/11/2025 107  95 - 110 mmol/L Final    CO2 01/11/2025 22 (L)  23 - 29 mmol/L  Final    Glucose 01/11/2025 92  70 - 110 mg/dL Final    BUN 01/11/2025 10  6 - 20 mg/dL Final    Creatinine 01/11/2025 0.7  0.5 - 1.4 mg/dL Final    Calcium 01/11/2025 8.5 (L)  8.7 - 10.5 mg/dL Final    Anion Gap 01/11/2025 9  8 - 16 mmol/L Final    eGFR 01/11/2025 >60  >60 mL/min/1.73 m^2 Final    Magnesium 01/11/2025 1.9  1.6 - 2.6 mg/dL Final    Phosphorus 01/11/2025 4.0  2.7 - 4.5 mg/dL Final    WBC 01/11/2025 5.75  3.90 - 12.70 K/uL Final    RBC 01/11/2025 3.86 (L)  4.00 - 5.40 M/uL Final    Hemoglobin 01/11/2025 11.0 (L)  12.0 - 16.0 g/dL Final    Hematocrit 01/11/2025 33.4 (L)  37.0 - 48.5 % Final    MCV 01/11/2025 87  82 - 98 fL Final    MCH 01/11/2025 28.5  27.0 - 31.0 pg Final    MCHC 01/11/2025 32.9  32.0 - 36.0 g/dL Final    RDW 01/11/2025 14.3  11.5 - 14.5 % Final    Platelets 01/11/2025 315  150 - 450 K/uL Final    MPV 01/11/2025 10.2  9.2 - 12.9 fL Final    Immature Granulocytes 01/11/2025 0.2  0.0 - 0.5 % Final    Gran # (ANC) 01/11/2025 2.1  1.8 - 7.7 K/uL Final    Immature Grans (Abs) 01/11/2025 0.01  0.00 - 0.04 K/uL Final    Lymph # 01/11/2025 2.4  1.0 - 4.8 K/uL Final    Mono # 01/11/2025 0.5  0.3 - 1.0 K/uL Final    Eos # 01/11/2025 0.8 (H)  0.0 - 0.5 K/uL Final    Baso # 01/11/2025 0.06  0.00 - 0.20 K/uL Final    nRBC 01/11/2025 0  0 /100 WBC Final    Gran % 01/11/2025 35.8 (L)  38.0 - 73.0 % Final    Lymph % 01/11/2025 40.9  18.0 - 48.0 % Final    Mono % 01/11/2025 8.5  4.0 - 15.0 % Final    Eosinophil % 01/11/2025 13.6 (H)  0.0 - 8.0 % Final    Basophil % 01/11/2025 1.0  0.0 - 1.9 % Final    Differential Method 01/11/2025 Automated   Final   Admission on 01/06/2025, Discharged on 01/07/2025   Component Date Value Ref Range Status    WBC 01/06/2025 9.41  3.90 - 12.70 K/uL Final    RBC 01/06/2025 4.43  4.00 - 5.40 M/uL Final    Hemoglobin 01/06/2025 12.8  12.0 - 16.0 g/dL Final    Hematocrit 01/06/2025 37.9  37.0 - 48.5 % Final    MCV 01/06/2025 86  82 - 98 fL Final    MCH 01/06/2025 28.9   27.0 - 31.0 pg Final    MCHC 01/06/2025 33.8  32.0 - 36.0 g/dL Final    RDW 01/06/2025 14.3  11.5 - 14.5 % Final    Platelets 01/06/2025 382  150 - 450 K/uL Final    MPV 01/06/2025 9.8  9.2 - 12.9 fL Final    Immature Granulocytes 01/06/2025 0.2  0.0 - 0.5 % Final    Gran # (ANC) 01/06/2025 5.4  1.8 - 7.7 K/uL Final    Immature Grans (Abs) 01/06/2025 0.02  0.00 - 0.04 K/uL Final    Lymph # 01/06/2025 2.4  1.0 - 4.8 K/uL Final    Mono # 01/06/2025 0.8  0.3 - 1.0 K/uL Final    Eos # 01/06/2025 0.8 (H)  0.0 - 0.5 K/uL Final    Baso # 01/06/2025 0.06  0.00 - 0.20 K/uL Final    nRBC 01/06/2025 0  0 /100 WBC Final    Gran % 01/06/2025 57.3  38.0 - 73.0 % Final    Lymph % 01/06/2025 25.3  18.0 - 48.0 % Final    Mono % 01/06/2025 8.0  4.0 - 15.0 % Final    Eosinophil % 01/06/2025 8.6 (H)  0.0 - 8.0 % Final    Basophil % 01/06/2025 0.6  0.0 - 1.9 % Final    Differential Method 01/06/2025 Automated   Final    Sodium 01/06/2025 132 (L)  136 - 145 mmol/L Final    Potassium 01/06/2025 3.3 (L)  3.5 - 5.1 mmol/L Final    Chloride 01/06/2025 102  95 - 110 mmol/L Final    CO2 01/06/2025 22 (L)  23 - 29 mmol/L Final    Glucose 01/06/2025 79  70 - 110 mg/dL Final    BUN 01/06/2025 7  6 - 20 mg/dL Final    Creatinine 01/06/2025 0.76  0.50 - 1.40 mg/dL Final    Calcium 01/06/2025 9.5  8.7 - 10.5 mg/dL Final    Total Protein 01/06/2025 7.7  6.0 - 8.4 g/dL Final    Albumin 01/06/2025 4.4  3.5 - 5.2 g/dL Final    Total Bilirubin 01/06/2025 0.2  0.2 - 1.0 mg/dL Final    Alkaline Phosphatase 01/06/2025 74  40 - 150 U/L Final    AST 01/06/2025 20  10 - 40 U/L Final    ALT 01/06/2025 11  10 - 44 U/L Final    Anion Gap 01/06/2025 8  8 - 16 mmol/L Final    eGFR 01/06/2025 >60  >60 mL/min/1.73 m^2 Final    POC Preg Test, Ur 01/06/2025 Negative  Negative Final     Acceptable 01/06/2025 Yes   Final   Admission on 01/05/2025, Discharged on 01/05/2025   Component Date Value Ref Range Status    POC Preg Test, Ur 01/05/2025 Negative   Negative Final     Acceptable 01/05/2025 Yes   Final   Lab Visit on 08/14/2024   Component Date Value Ref Range Status    WBC 08/14/2024 5.11  3.90 - 12.70 K/uL Final    RBC 08/14/2024 4.51  4.00 - 5.40 M/uL Final    Hemoglobin 08/14/2024 12.7  12.0 - 16.0 g/dL Final    Hematocrit 08/14/2024 38.7  37.0 - 48.5 % Final    MCV 08/14/2024 86  82 - 98 fL Final    MCH 08/14/2024 28.2  27.0 - 31.0 pg Final    MCHC 08/14/2024 32.8  32.0 - 36.0 g/dL Final    RDW 08/14/2024 13.4  11.5 - 14.5 % Final    Platelets 08/14/2024 340  150 - 450 K/uL Final    MPV 08/14/2024 10.3  9.2 - 12.9 fL Final    Immature Granulocytes 08/14/2024 0.2  0.0 - 0.5 % Final    Gran # (ANC) 08/14/2024 1.8  1.8 - 7.7 K/uL Final    Immature Grans (Abs) 08/14/2024 0.01  0.00 - 0.04 K/uL Final    Lymph # 08/14/2024 2.2  1.0 - 4.8 K/uL Final    Mono # 08/14/2024 0.3  0.3 - 1.0 K/uL Final    Eos # 08/14/2024 0.8 (H)  0.0 - 0.5 K/uL Final    Baso # 08/14/2024 0.04  0.00 - 0.20 K/uL Final    nRBC 08/14/2024 0  0 /100 WBC Final    Gran % 08/14/2024 34.1 (L)  38.0 - 73.0 % Final    Lymph % 08/14/2024 42.7  18.0 - 48.0 % Final    Mono % 08/14/2024 6.3  4.0 - 15.0 % Final    Eosinophil % 08/14/2024 15.9 (H)  0.0 - 8.0 % Final    Basophil % 08/14/2024 0.8  0.0 - 1.9 % Final    Differential Method 08/14/2024 Automated   Final    Sodium 08/14/2024 138  136 - 145 mmol/L Final    Potassium 08/14/2024 4.1  3.5 - 5.1 mmol/L Final    Chloride 08/14/2024 105  95 - 110 mmol/L Final    CO2 08/14/2024 29  23 - 29 mmol/L Final    Glucose 08/14/2024 85  70 - 110 mg/dL Final    BUN 08/14/2024 10  6 - 20 mg/dL Final    Creatinine 08/14/2024 0.6  0.5 - 1.4 mg/dL Final    Calcium 08/14/2024 9.5  8.7 - 10.5 mg/dL Final    Total Protein 08/14/2024 7.2  6.0 - 8.4 g/dL Final    Albumin 08/14/2024 4.2  3.5 - 5.2 g/dL Final    Total Bilirubin 08/14/2024 0.6  0.1 - 1.0 mg/dL Final    Alkaline Phosphatase 08/14/2024 59  55 - 135 U/L Final    AST 08/14/2024 19  10 - 40 U/L  Final    ALT 08/14/2024 20  10 - 44 U/L Final    eGFR 08/14/2024 >60.0  >60 mL/min/1.73 m^2 Final    Anion Gap 08/14/2024 4 (L)  8 - 16 mmol/L Final    Iron 08/14/2024 137  30 - 160 ug/dL Final    Transferrin 08/14/2024 260  200 - 375 mg/dL Final    TIBC 08/14/2024 364  250 - 450 ug/dL Final    Saturated Iron 08/14/2024 38  20 - 50 % Final    Ferritin 08/14/2024 11.7 (L)  20.0 - 300.0 ng/mL Final       Past Medical History:   Diagnosis Date    Acne 11/20/2020    Autism 8/21/2020    Chronic constipation 8/22/2020    Gastroesophageal reflux disease without esophagitis 8/21/2020    History of speech therapy      History reviewed. No pertinent surgical history.  Family History   Problem Relation Name Age of Onset    No Known Problems Mother      No Known Problems Father      Cataracts Maternal Grandfather      Glaucoma Neg Hx      Macular degeneration Neg Hx         Marital Status: Single  Alcohol History:  reports no history of alcohol use.  Tobacco History:  reports that she has never smoked. She has never used smokeless tobacco.  Drug History:  reports no history of drug use.    Review of patient's allergies indicates:   Allergen Reactions    Egg derived      Egg white    Lactose        Current Outpatient Medications:     lactase (LACTAID) 3,000 unit tablet, Take 1 tablet by mouth 3 (three) times daily with meals., Disp: , Rfl:     Lactobacillus acidophilus (PROBIOTIC ACIDOPHILUS ORAL), Take 1 tablet by mouth once daily., Disp: , Rfl:     polyethylene glycol (GLYCOLAX) 17 gram/dose powder, Take 17 g by mouth once daily., Disp: , Rfl:     clonazePAM (KLONOPIN) 0.5 MG tablet, Take one tablet by mouth 1 hour prior to procedure., Disp: 1 tablet, Rfl: 0    doxycycline (VIBRA-TABS) 100 MG tablet, Take 1 tablet (100 mg total) by mouth 2 (two) times daily., Disp: 20 tablet, Rfl: 0    mupirocin (BACTROBAN) 2 % ointment, by Nasal route 3 (three) times daily. for 10 days, Disp: 22 g, Rfl: 1  Objective:      Vitals:    01/15/25  "1524   BP: 110/66   Pulse: 66   Temp: 97.6 °F (36.4 °C)   SpO2: 99%   Weight: 76.2 kg (168 lb)   Height: 5' 5" (1.651 m)     Physical Exam  Physical Exam    General: No acute distress. Well-developed. Well-nourished.  Eyes: EOMI. Sclerae anicteric.  HENT: Normocephalic. Atraumatic. Nares patent. Moist oral mucosa.  Ears: Bilateral TMs clear. Bilateral EACs clear.  Cardiovascular: Regular rate. Regular rhythm. No murmurs. No rubs. No gallops. Normal S1, S2.  Respiratory: Normal respiratory effort. Clear to auscultation bilaterally. No rales. No rhonchi. No wheezing.  Abdomen: Soft. Non-tender. Non-distended. Normoactive bowel sounds.  Musculoskeletal: No  obvious deformity.  Extremities: No lower extremity edema.  Neurological: Alert & oriented x3. No slurred speech. Normal gait.  Psychiatric: Normal mood. Normal affect. Good insight. Good judgment.  Skin: Warm. Dry. No rash.  Mouth: Tongue appears normal.       Assessment:       Assessment & Plan    - Diagnosed MRSA based on previous ER visit and current presentation  - Determined Augmentin not fully effective for MRSA treatment  - Opted for doxycycline over Bactrim due to better tolerability and less strain on kidneys  - Considered patient's history of abscess and family history of similar infections  - Assessed breast-related concerns, recommended GYN follow-up for timing-specific imaging    MRSA INFECTION:  - Explained MRSA infection and its resistance to certain antibiotics to the patient.  - Informed the patient about the importance of completing the full course of antibiotics.  - Instructed the patient to apply Bactroban ointment in nose using Q-tip twice daily, morning and night.  - Prescribed doxycycline for 10 days to replace Augmentin, as it is effective against methicillin-resistant strains of staph.  - Continued Bactroban (mupirocin) ointment for nasal application twice daily.  - Refilled mupirocin ointment prescription.  - Discontinued Augmentin.  - " Advised the patient to take the medication with food if nausea occurs, and to be cautious of sun exposure due to increased photosensitivity.  - Instructed the patient to contact the office if any reaction to doxycycline occurs.  - Recommend follow-up if infection does not improve with new antibiotic regimen.  - Discussed potential side effects of doxycycline, including sun sensitivity.  - Patient to use sun protection (hats, sunscreen) while on doxycycline to prevent sun sensitivity reactions.    ORBITAL CELLULITIS:  - Monitored the infection in the left eye area, which was initially treated in the emergency room.  - Assessed the risk of the infection spreading to the eye or brain if not properly treated.  - Prescribed doxycycline to replace Augmentin for more effective treatment of the orbital cellulitis.  - Instructed the patient to apply Bactroban ointment to prevent reinfection.    BREAST CYSTS:  - Educated the patient on the relationship between hormonal changes and breast cysts.  - Referred the patient to a gynecologist for breast cyst evaluation and timing-specific mammogram.  - Noted that the patient's daughter has a suspected cyst in her breast that fluctuates with her menstrual cycle.  - Recommend consulting with a gynecologist for timing of a mammogram, suggesting it should be performed when the cyst is at its largest point during the menstrual cycle.    FAMILY HISTORY OF BREAST CANCER:  - Noted that mother had breast cancer 8 years ago, which has now recurred and metastasized.  - Noted the patient's family history of breast cancer and reported blood discharge from her right breast.  - Reviewed the patient's multiple screenings including mammograms, ultrasounds, MRIs, AMBRE testing, and cultures.  - Noted that no tumor has been found, but the patient's TC score is over 19.    ANXIETY:  - Prescribed clonazepam (1 dose) to be taken before pap smear to manage anxiety.    MEDICATIONS/SUPPLEMENTS:  - Patient  to continue taking probiotics.       Plan:       Hospital discharge follow-up  Completed    MRSA (methicillin resistant staph aureus) culture positive  -     doxycycline (VIBRA-TABS) 100 MG tablet; Take 1 tablet (100 mg total) by mouth 2 (two) times daily.  Dispense: 20 tablet; Refill: 0  -     mupirocin (BACTROBAN) 2 % ointment; by Nasal route 3 (three) times daily. for 10 days  Dispense: 22 g; Refill: 1    Panic  -     clonazePAM (KLONOPIN) 0.5 MG tablet; Take one tablet by mouth 1 hour prior to procedure.  Dispense: 1 tablet; Refill: 0  (For pre-medication for papsmear.    Follow up if symptoms worsen or fail to improve.    This note was generated with the assistance of ambient listening technology. Verbal consent was obtained by the patient and accompanying visitor(s) for the recording of patient appointment to facilitate this note. I attest to having reviewed and edited the generated note for accuracy, though some syntax or spelling errors may persist. Please contact the author of this note for any clarification.

## 2025-02-13 ENCOUNTER — TELEPHONE (OUTPATIENT)
Facility: CLINIC | Age: 25
End: 2025-02-13
Payer: COMMERCIAL

## 2025-02-13 NOTE — TELEPHONE ENCOUNTER
LVM to confirm appointment scheduled for 2/20 at 2:30 with Dr. sIsa and remind pt to complete labs prior to visit. Standing orders in chart.

## 2025-02-17 ENCOUNTER — LAB VISIT (OUTPATIENT)
Dept: LAB | Facility: HOSPITAL | Age: 25
End: 2025-02-17
Attending: INTERNAL MEDICINE
Payer: COMMERCIAL

## 2025-02-17 DIAGNOSIS — D50.9 IRON DEFICIENCY ANEMIA, UNSPECIFIED IRON DEFICIENCY ANEMIA TYPE: ICD-10-CM

## 2025-02-17 LAB
ALBUMIN SERPL BCP-MCNC: 4.3 G/DL (ref 3.5–5.2)
ALP SERPL-CCNC: 67 U/L (ref 55–135)
ALT SERPL W/O P-5'-P-CCNC: 17 U/L (ref 10–44)
ANION GAP SERPL CALC-SCNC: 8 MMOL/L (ref 8–16)
AST SERPL-CCNC: 17 U/L (ref 10–40)
BASOPHILS # BLD AUTO: 0.07 K/UL (ref 0–0.2)
BASOPHILS NFR BLD: 1.3 % (ref 0–1.9)
BILIRUB SERPL-MCNC: 0.5 MG/DL (ref 0.1–1)
BUN SERPL-MCNC: 8 MG/DL (ref 6–20)
CALCIUM SERPL-MCNC: 9.2 MG/DL (ref 8.7–10.5)
CHLORIDE SERPL-SCNC: 105 MMOL/L (ref 95–110)
CO2 SERPL-SCNC: 25 MMOL/L (ref 23–29)
CREAT SERPL-MCNC: 0.8 MG/DL (ref 0.5–1.4)
DIFFERENTIAL METHOD BLD: ABNORMAL
EOSINOPHIL # BLD AUTO: 0.5 K/UL (ref 0–0.5)
EOSINOPHIL NFR BLD: 10.2 % (ref 0–8)
ERYTHROCYTE [DISTWIDTH] IN BLOOD BY AUTOMATED COUNT: 13.9 % (ref 11.5–14.5)
EST. GFR  (NO RACE VARIABLE): >60 ML/MIN/1.73 M^2
FERRITIN SERPL-MCNC: 7.8 NG/ML (ref 20–300)
GLUCOSE SERPL-MCNC: 88 MG/DL (ref 70–110)
HCT VFR BLD AUTO: 36.8 % (ref 37–48.5)
HGB BLD-MCNC: 12.1 G/DL (ref 12–16)
IMM GRANULOCYTES # BLD AUTO: 0.01 K/UL (ref 0–0.04)
IMM GRANULOCYTES NFR BLD AUTO: 0.2 % (ref 0–0.5)
IRON SERPL-MCNC: 49 UG/DL (ref 30–160)
LYMPHOCYTES # BLD AUTO: 2 K/UL (ref 1–4.8)
LYMPHOCYTES NFR BLD: 36.7 % (ref 18–48)
MCH RBC QN AUTO: 28.5 PG (ref 27–31)
MCHC RBC AUTO-ENTMCNC: 32.9 G/DL (ref 32–36)
MCV RBC AUTO: 87 FL (ref 82–98)
MONOCYTES # BLD AUTO: 0.3 K/UL (ref 0.3–1)
MONOCYTES NFR BLD: 6.4 % (ref 4–15)
NEUTROPHILS # BLD AUTO: 2.4 K/UL (ref 1.8–7.7)
NEUTROPHILS NFR BLD: 45.2 % (ref 38–73)
NRBC BLD-RTO: 0 /100 WBC
PLATELET # BLD AUTO: 321 K/UL (ref 150–450)
PMV BLD AUTO: 10.1 FL (ref 9.2–12.9)
POTASSIUM SERPL-SCNC: 4 MMOL/L (ref 3.5–5.1)
PROT SERPL-MCNC: 6.9 G/DL (ref 6–8.4)
RBC # BLD AUTO: 4.24 M/UL (ref 4–5.4)
SATURATED IRON: 13 % (ref 20–50)
SODIUM SERPL-SCNC: 138 MMOL/L (ref 136–145)
TOTAL IRON BINDING CAPACITY: 386 UG/DL (ref 250–450)
TRANSFERRIN SERPL-MCNC: 276 MG/DL (ref 200–375)
WBC # BLD AUTO: 5.31 K/UL (ref 3.9–12.7)

## 2025-02-17 PROCEDURE — 82728 ASSAY OF FERRITIN: CPT | Performed by: INTERNAL MEDICINE

## 2025-02-17 PROCEDURE — 84466 ASSAY OF TRANSFERRIN: CPT | Performed by: INTERNAL MEDICINE

## 2025-02-17 PROCEDURE — 80053 COMPREHEN METABOLIC PANEL: CPT | Performed by: INTERNAL MEDICINE

## 2025-02-17 PROCEDURE — 36415 COLL VENOUS BLD VENIPUNCTURE: CPT | Performed by: INTERNAL MEDICINE

## 2025-02-17 PROCEDURE — 85025 COMPLETE CBC W/AUTO DIFF WBC: CPT | Performed by: INTERNAL MEDICINE

## 2025-02-19 NOTE — PROGRESS NOTES
"[facility] Hematology/Oncology  PROGRESS NOTE - Follow-up Visit      Subjective:       Patient ID:   NAME: Alanna Hammer : 2000     24 y.o. female    Referring Doc: Danielle Angel MD  Other Physicians: Dr. Costa, Dr. Conteh, Dr. Diane,             Chief Complaint: Iron Deficiency Anemia  f/u     History of Present Illness:     Patient returns today for a regularly scheduled follow-up visit.  The patient is here today to go over the results of the recently ordered labs, tests and studies. She is here with by herself today      She is doing ok with no new issues    She states she has been feeling "ok';       She last saw Cristy Valentin NP on 1/15/2025 - she had an abscess issue which has since resolved     No CP, SOB, HA's or N/V              ROS:   GEN: normal without any fever, night sweats or weight loss;    HEENT: normal with no HA's, sore throat, stiff neck, changes in vision  CV: normal with no CP, SOB, PND, DAVIS or orthopnea  PULM: normal with no SOB, cough, hemoptysis, sputum or pleuritic pain  GI: normal with no abdominal pain, nausea, vomiting, constipation, diarrhea, melanotic stools, BRBPR, or hematemesis  : normal with no hematuria, dysuria  BREAST: normal with no mass, discharge, pain  SKIN: normal with no rash, erythema, bruising, or swelling    Pain Scale: 0     Allergies:  Review of patient's allergies indicates:   Allergen Reactions    Egg derived      Egg white    Lactose        Medications:    Current Outpatient Medications:     clonazePAM (KLONOPIN) 0.5 MG tablet, Take one tablet by mouth 1 hour prior to procedure., Disp: 1 tablet, Rfl: 0    doxycycline (VIBRA-TABS) 100 MG tablet, Take 1 tablet (100 mg total) by mouth 2 (two) times daily., Disp: 20 tablet, Rfl: 0    lactase (LACTAID) 3,000 unit tablet, Take 1 tablet by mouth 3 (three) times daily with meals., Disp: , Rfl:     Lactobacillus acidophilus (PROBIOTIC ACIDOPHILUS ORAL), Take 1 tablet by mouth once daily., " "Disp: , Rfl:     polyethylene glycol (GLYCOLAX) 17 gram/dose powder, Take 17 g by mouth once daily., Disp: , Rfl:     PMHx/PSHx Updates:  See patient's last visit with me on 8/20/2024  See H&P on 12/2/2022        Pathology:   Cancer Staging   No matching staging information was found for the patient.            Objective:     Vitals:  Blood pressure 104/70, pulse 89, temperature 98.2 °F (36.8 °C), temperature source Temporal, resp. rate 16, height 5' 5" (1.651 m), weight 77.1 kg (170 lb).    Physical Examination:   GEN: no apparent distress, comfortable; AAOx3  HEAD: atraumatic and normocephalic  EYES: no pallor, no icterus, PERRLA  ENT: OMM, no pharyngeal erythema, external ears WNL; no nasal discharge; no thrush  NECK: no masses, thyroid normal, trachea midline, no LAD/LN's, supple  CV: RRR with no murmur; normal pulse; normal S1 and S2; no pedal edema  CHEST: Normal respiratory effort; CTAB; normal breath sounds; no wheeze or crackles  ABDOM: nontender and nondistended; soft; normal bowel sounds; no rebound/guarding  MUSC/Skeletal: ROM normal; no crepitus; joints normal; no deformities or arthropathy  EXTREM: no clubbing, cyanosis, inflammation or swelling  SKIN: no rashes, lesions, ulcers, petechiae or subcutaneous nodules  : no koch  NEURO: grossly intact; motor/sensory WNL; AAOx3; no tremors  PSYCH: normal mood, affect and behavior  LYMPH: normal cervical, supraclavicular, axillary and groin LN's            Labs:     Lab Results   Component Value Date    WBC 5.31 02/17/2025    HGB 12.1 02/17/2025    HCT 36.8 (L) 02/17/2025    MCV 87 02/17/2025     02/17/2025       CMP  Sodium   Date Value Ref Range Status   02/17/2025 138 136 - 145 mmol/L Final     Potassium   Date Value Ref Range Status   02/17/2025 4.0 3.5 - 5.1 mmol/L Final     Chloride   Date Value Ref Range Status   02/17/2025 105 95 - 110 mmol/L Final     CO2   Date Value Ref Range Status   02/17/2025 25 23 - 29 mmol/L Final     Glucose   Date " Value Ref Range Status   02/17/2025 88 70 - 110 mg/dL Final     BUN   Date Value Ref Range Status   02/17/2025 8 6 - 20 mg/dL Final     Creatinine   Date Value Ref Range Status   02/17/2025 0.8 0.5 - 1.4 mg/dL Final     Calcium   Date Value Ref Range Status   02/17/2025 9.2 8.7 - 10.5 mg/dL Final     Total Protein   Date Value Ref Range Status   02/17/2025 6.9 6.0 - 8.4 g/dL Final     Albumin   Date Value Ref Range Status   02/17/2025 4.3 3.5 - 5.2 g/dL Final     Total Bilirubin   Date Value Ref Range Status   02/17/2025 0.5 0.1 - 1.0 mg/dL Final     Comment:     For infants and newborns, interpretation of results should be based  on gestational age, weight and in agreement with clinical  observations.    Premature Infant recommended reference ranges:  Up to 24 hours.............<8.0 mg/dL  Up to 48 hours............<12.0 mg/dL  3-5 days..................<15.0 mg/dL  6-29 days.................<15.0 mg/dL       Alkaline Phosphatase   Date Value Ref Range Status   02/17/2025 67 55 - 135 U/L Final     AST   Date Value Ref Range Status   02/17/2025 17 10 - 40 U/L Final     ALT   Date Value Ref Range Status   02/17/2025 17 10 - 44 U/L Final     Anion Gap   Date Value Ref Range Status   02/17/2025 8 8 - 16 mmol/L Final     eGFR   Date Value Ref Range Status   02/17/2025 >60.0 >60 mL/min/1.73 m^2 Final         Lab Results   Component Value Date    IRON 49 02/17/2025    TRANSFERRIN 276 02/17/2025    TIBC 386 02/17/2025    FESATURATED 13 (L) 02/17/2025      Lab Results   Component Value Date    FERRITIN 7.8 (L) 02/17/2025              Hgb F                          0.0              %        MB     Reference Range: 0.0-2.0                                 Hgb A                          97.7             %        MB     Reference Range: 96.4-98.8                               Hgb A2                         2.3              %        MB     Reference Range: 1.8-3.2                                 Hgb S                          0.0               %        MB     Reference Range: 0.0                                         Alpha-Thalassemia              Comment:                  TG     Test: Alpha-Thalassemia, DNA Analysis   Result:   Negative, alpha alpha/alpha alpha     Radiology/Diagnostic Studies:  US BREAST LEFT LIMITED     CLINICAL HISTORY:  Unspecified lump in the left breast, upper outer quadrant     TECHNIQUE:  CMS MANDATED QUALITY DATA - MAMMOGRAM - 225     [facility] utilizes a reminder system to ensure that all patients receive reminder letters and/or direct phone calls for appointments. This includes reminders for routine screening mammograms, diagnostic mammograms, and other breast imaging interventions when appropriate. This patient will be placed in the appropriate reminder system.     The interpretation was assisted by Computer Aided Detection with [employer].     COMPARISON:  None     FINDINGS:  Grayscale evaluation of the left breast 3-4 o'clock area of interest, 6 cm from the nipple was performed.  Of note, patient reports the palpable abnormality is intermittent, and does not feel it today.  Normal fibroglandular elements are evident in this region.  No suspicious cystic or solid mass.     Impression:     Negative targeted left breast ultrasound.     Clinical follow-up and screening mammography at age 40 recommended.     BI-RADS CATEGORY 1: NEGATIVE.    I have reviewed all available lab results and radiology reports.    Assessment/Plan:   (1) 24 y.o. female with diagnosis of iron deficiency anemia. She has been on oral iron for three months with only minimal improvements.   - orders placed for IV iron x 4  - recheck labs after 4 weeks and then follow up with Dr. Issa   - Thalessemia and hemoglobinopathy     1/11/2023:  - she is getting 4th IV iron today  - check labs monthly - will have done next week    3/8/2023:  - s/p last IV iron was last week  - latest labs are good with hgb WNL; iron panel now  WNL    6/15/2023:  - hgb, iron and ferritin remain wnl  - check labs again in 3 month  - encourage the use of oral iron    9/7/2023:  - latest hgb was 13.4  - encouraged her to resume oral iron  - latest iron 52 and ferritin 24    1/9/2024:  - latest CBC is adequate, with no anemia  - last iron infusion was in March 2023  - Iron sat 15%; ferritin 7.5  - discussed resumption of oral iron    5/21/2024:  - latest hgb WNL  - continued on oral iron but forgets top take it intermittently  - iron panel adequate except for low ferritin    8/20/2024:  - latest CBC adequate  - normal hgb  - iron panel adequate  - continued on oral iron    2/20/2025  - hgb up to 12.1  - iron is 49 but iron sat 13% and ferritn low at 7.8  - encouraged compliance with daily oral iron  - if level;s do not improve in next couple of months, then will consider resuming some IV iron       (2) Gastritis   - follow up with GI   - takes a probiotic daily      (3) Autism/OCD            VISIT DIAGNOSES:      Iron deficiency anemia, unspecified iron deficiency anemia type            PLAN:  1. Check labs every month for now, encouarged daily use of iron, and resume IV iron as needed; encouraged her to take the oral iron  2. Encouraged use of oral iron  3.  F/u with GYN, PCP  4. Follow up with GI as needed         RTC in 3months      Fax note to  Cristy Valentin FNP-C, and [place]    Discussion:     COVID-19 Discussion:    I had long discussion with patient and any applicable family about the COVID-19 coronavirus epidemic and the recommended precautions with regard to cancer and/or hematology patients. I have re-iterated the CDC recommendations for adequate hand washing, use of hand -like products, and coughing into elbow, etc. In addition, especially for our patients who are on chemotherapy and/or our otherwise immunocompromised patients, I have recommended avoidance of crowds, including movie theaters, restaurants, churches, etc. I have recommended  avoidance of any sick or symptomatic family members and/or friends. I have also recommended avoidance of any raw and unwashed food products, and general avoidance of food items that have not been prepared by themselves. The patient has been asked to call us immediately with any symptom developments, issues, questions or other general concerns.       Iron Infusion Therapy Discussion:     I provided literature/learning materials on the particular IV iron regimen and discussed the potential side-effect profiles of the drug(s). I discussed the importance of compliance with obtaining and monitoring requested lab work, and went over the potential risk for the development of anaphylactic shock, bronchospasm, dysrhythmia, liver and/or kidney damage, and respiratory/cardiovascular arrest and/or failure. I discussed the potential risks for development of alopecia, fevers, itching, chills and/or rigors, cold sensory issues, ringing in ears, vertigo and neuropathy, all of which are usually acute but sometimes could end up being chronic and life-long. I discussed the risks of hand-foot syndrome and rashes, and development of other autoimmune mediated processes such as pneumonitis and colitis which could be life threatening.     The patient's consent has been obtained to proceed with the IV iron therapy.The patient will be referred to [facility] for training and education on IV iron therapy, use of antiemetics and/or anti-diarrheals, use of NSAID's, potential IV iron therapy side-effects, and any specific recommendations and precautions with the particular IV iron agents.      I answered all of the patient's (and family's, if applicable) questions to the best of my ability and to their complete satisfaction. The patient acknowledged full understanding of the risks, recommendations and plan(s).     I spent over 25 mins of time with the patient. Reviewed results of the recently ordered labs, tests and  studies; made directives with regards to the results. Over half of this time was spent couseling and coordinating care.    I have explained all of the above in detail and the patient understands all of the current recommendation(s). I have answered all of their questions to the best of my ability and to their complete satisfaction.   The patient is to continue with the current management plan.            Electronically signed by Gustavo Issa MD

## 2025-02-20 ENCOUNTER — OFFICE VISIT (OUTPATIENT)
Facility: CLINIC | Age: 25
End: 2025-02-20
Payer: COMMERCIAL

## 2025-02-20 VITALS
WEIGHT: 170 LBS | BODY MASS INDEX: 28.32 KG/M2 | HEIGHT: 65 IN | SYSTOLIC BLOOD PRESSURE: 104 MMHG | RESPIRATION RATE: 16 BRPM | DIASTOLIC BLOOD PRESSURE: 70 MMHG | TEMPERATURE: 98 F | HEART RATE: 89 BPM

## 2025-02-20 DIAGNOSIS — D50.9 IRON DEFICIENCY ANEMIA, UNSPECIFIED IRON DEFICIENCY ANEMIA TYPE: Primary | ICD-10-CM

## 2025-04-03 ENCOUNTER — PATIENT MESSAGE (OUTPATIENT)
Dept: ADMINISTRATIVE | Facility: HOSPITAL | Age: 25
End: 2025-04-03
Payer: COMMERCIAL

## 2025-05-13 ENCOUNTER — TELEPHONE (OUTPATIENT)
Facility: CLINIC | Age: 25
End: 2025-05-13
Payer: COMMERCIAL

## 2025-05-13 NOTE — TELEPHONE ENCOUNTER
I left voice message for pt regarding confirming appt and completing labs prior to appt w/ Dr. Issa, on 5/26/2025. Left number for pt to contact the office, if they have any questions, would like to confirm/ reschedule. Also, I would have to schedule pt's lab appt @ an [facility], because they are no longer allowing walk ins.

## 2025-05-20 ENCOUNTER — LAB VISIT (OUTPATIENT)
Dept: LAB | Facility: HOSPITAL | Age: 25
End: 2025-05-20
Attending: INTERNAL MEDICINE
Payer: MEDICAID

## 2025-05-20 DIAGNOSIS — D50.9 IRON DEFICIENCY ANEMIA, UNSPECIFIED IRON DEFICIENCY ANEMIA TYPE: ICD-10-CM

## 2025-05-20 LAB
ABSOLUTE EOSINOPHIL (SMH): 0.22 K/UL
ABSOLUTE MONOCYTE (SMH): 0.38 K/UL (ref 0.3–1)
ABSOLUTE NEUTROPHIL COUNT (SMH): 2.8 K/UL (ref 1.8–7.7)
ALBUMIN SERPL-MCNC: 4.4 G/DL (ref 3.5–5.2)
ALP SERPL-CCNC: 83 UNIT/L (ref 55–135)
ALT SERPL-CCNC: 13 UNIT/L (ref 10–44)
ANION GAP (SMH): 9 MMOL/L (ref 8–16)
AST SERPL-CCNC: 16 UNIT/L (ref 10–40)
BASOPHILS # BLD AUTO: 0.05 K/UL
BASOPHILS NFR BLD AUTO: 0.9 %
BILIRUB SERPL-MCNC: 0.4 MG/DL (ref 0.1–1)
BUN SERPL-MCNC: 11 MG/DL (ref 6–20)
CALCIUM SERPL-MCNC: 9.6 MG/DL (ref 8.7–10.5)
CHLORIDE SERPL-SCNC: 104 MMOL/L (ref 95–110)
CO2 SERPL-SCNC: 24 MMOL/L (ref 23–29)
CREAT SERPL-MCNC: 0.7 MG/DL (ref 0.5–1.4)
ERYTHROCYTE [DISTWIDTH] IN BLOOD BY AUTOMATED COUNT: 13.8 % (ref 11.5–14.5)
FERRITIN SERPL-MCNC: 10.6 NG/ML (ref 20–300)
GFR SERPLBLD CREATININE-BSD FMLA CKD-EPI: >60 ML/MIN/1.73/M2
GLUCOSE SERPL-MCNC: 89 MG/DL (ref 70–110)
HCT VFR BLD AUTO: 37.6 % (ref 37–48.5)
HGB BLD-MCNC: 12.4 GM/DL (ref 12–16)
IMM GRANULOCYTES # BLD AUTO: 0.01 K/UL (ref 0–0.04)
IMM GRANULOCYTES NFR BLD AUTO: 0.2 % (ref 0–0.5)
IRON SATN MFR SERPL: 18 % (ref 20–50)
IRON SERPL-MCNC: 72 UG/DL (ref 30–160)
LYMPHOCYTES # BLD AUTO: 1.96 K/UL (ref 1–4.8)
MCH RBC QN AUTO: 27.8 PG (ref 27–31)
MCHC RBC AUTO-ENTMCNC: 33 G/DL (ref 32–36)
MCV RBC AUTO: 84 FL (ref 82–98)
NUCLEATED RBC (/100WBC) (SMH): 0 /100 WBC
PLATELET # BLD AUTO: 394 K/UL (ref 150–450)
PMV BLD AUTO: 9.9 FL (ref 9.2–12.9)
POTASSIUM SERPL-SCNC: 3.7 MMOL/L (ref 3.5–5.1)
PROT SERPL-MCNC: 7.9 GM/DL (ref 6–8.4)
RBC # BLD AUTO: 4.46 M/UL (ref 4–5.4)
RELATIVE EOSINOPHIL (SMH): 4.1 % (ref 0–8)
RELATIVE LYMPHOCYTE (SMH): 36.3 % (ref 18–48)
RELATIVE MONOCYTE (SMH): 7 % (ref 4–15)
RELATIVE NEUTROPHIL (SMH): 51.5 % (ref 38–73)
SODIUM SERPL-SCNC: 137 MMOL/L (ref 136–145)
TIBC SERPL-MCNC: 398 UG/DL (ref 250–450)
TRANSFERRIN SERPL-MCNC: 284 MG/DL (ref 200–375)
WBC # BLD AUTO: 5.4 K/UL (ref 3.9–12.7)

## 2025-05-20 PROCEDURE — 84466 ASSAY OF TRANSFERRIN: CPT

## 2025-05-20 PROCEDURE — 36415 COLL VENOUS BLD VENIPUNCTURE: CPT

## 2025-05-20 PROCEDURE — 80053 COMPREHEN METABOLIC PANEL: CPT

## 2025-05-20 PROCEDURE — 85025 COMPLETE CBC W/AUTO DIFF WBC: CPT

## 2025-05-20 PROCEDURE — 82728 ASSAY OF FERRITIN: CPT

## 2025-05-26 ENCOUNTER — OFFICE VISIT (OUTPATIENT)
Facility: CLINIC | Age: 25
End: 2025-05-26
Payer: MEDICAID

## 2025-05-26 VITALS
DIASTOLIC BLOOD PRESSURE: 53 MMHG | BODY MASS INDEX: 28.36 KG/M2 | RESPIRATION RATE: 18 BRPM | TEMPERATURE: 99 F | WEIGHT: 170.19 LBS | HEIGHT: 65 IN | SYSTOLIC BLOOD PRESSURE: 105 MMHG | HEART RATE: 78 BPM | OXYGEN SATURATION: 96 %

## 2025-05-26 DIAGNOSIS — D50.9 IRON DEFICIENCY ANEMIA, UNSPECIFIED IRON DEFICIENCY ANEMIA TYPE: Primary | ICD-10-CM

## 2025-05-26 PROCEDURE — 99214 OFFICE O/P EST MOD 30 MIN: CPT | Mod: S$PBB,,, | Performed by: INTERNAL MEDICINE

## 2025-05-26 PROCEDURE — 99213 OFFICE O/P EST LOW 20 MIN: CPT | Mod: PBBFAC,PN | Performed by: INTERNAL MEDICINE

## 2025-05-26 PROCEDURE — G2211 COMPLEX E/M VISIT ADD ON: HCPCS | Mod: S$PBB,,, | Performed by: INTERNAL MEDICINE

## 2025-05-26 PROCEDURE — 3074F SYST BP LT 130 MM HG: CPT | Mod: CPTII,,, | Performed by: INTERNAL MEDICINE

## 2025-05-26 PROCEDURE — 3008F BODY MASS INDEX DOCD: CPT | Mod: CPTII,,, | Performed by: INTERNAL MEDICINE

## 2025-05-26 PROCEDURE — 1160F RVW MEDS BY RX/DR IN RCRD: CPT | Mod: CPTII,,, | Performed by: INTERNAL MEDICINE

## 2025-05-26 PROCEDURE — 1159F MED LIST DOCD IN RCRD: CPT | Mod: CPTII,,, | Performed by: INTERNAL MEDICINE

## 2025-05-26 PROCEDURE — 3078F DIAST BP <80 MM HG: CPT | Mod: CPTII,,, | Performed by: INTERNAL MEDICINE

## 2025-05-26 PROCEDURE — 99999 PR PBB SHADOW E&M-EST. PATIENT-LVL III: CPT | Mod: PBBFAC,,, | Performed by: INTERNAL MEDICINE

## 2025-05-26 NOTE — PROGRESS NOTES
"[facility] Hematology/Oncology  PROGRESS NOTE - Follow-up Visit      Subjective:       Patient ID:   NAME: Alanna Hammer : 2000     24 y.o. female    Referring Doc: Danielle Angel MD  Other Physicians: Dr. Costa, Dr. Conteh, Dr. Diane,             Chief Complaint: Iron Deficiency Anemia  f/u     History of Present Illness:     Patient returns today for a regularly scheduled follow-up visit.  The patient is here today to go over the results of the recently ordered labs, tests and studies. She is here with her mom today      She is doing ok with no new issues    She states she has been feeling "ok';       She last saw Cristy Valentin NP on 1/15/2025      No CP, SOB, HA's or N/V              ROS:   GEN: normal without any fever, night sweats or weight loss;    HEENT: normal with no HA's, sore throat, stiff neck, changes in vision  CV: normal with no CP, SOB, PND, DAVIS or orthopnea  PULM: normal with no SOB, cough, hemoptysis, sputum or pleuritic pain  GI: normal with no abdominal pain, nausea, vomiting, constipation, diarrhea, melanotic stools, BRBPR, or hematemesis  : normal with no hematuria, dysuria  BREAST: normal with no mass, discharge, pain  SKIN: normal with no rash, erythema, bruising, or swelling    Pain Scale: 0     Allergies:  Review of patient's allergies indicates:   Allergen Reactions    Egg derived      Egg white    Lactose        Medications:    Current Outpatient Medications:     lactase (LACTAID) 3,000 unit tablet, Take 1 tablet by mouth 3 (three) times daily with meals., Disp: , Rfl:     Lactobacillus acidophilus (PROBIOTIC ACIDOPHILUS ORAL), Take 1 tablet by mouth once daily., Disp: , Rfl:     polyethylene glycol (GLYCOLAX) 17 gram/dose powder, Take 17 g by mouth once daily., Disp: , Rfl:     clonazePAM (KLONOPIN) 0.5 MG tablet, Take one tablet by mouth 1 hour prior to procedure. (Patient not taking: Reported on 2025), Disp: 1 tablet, Rfl: 0    doxycycline " "(VIBRA-TABS) 100 MG tablet, Take 1 tablet (100 mg total) by mouth 2 (two) times daily. (Patient not taking: Reported on 5/26/2025), Disp: 20 tablet, Rfl: 0    PMHx/PSHx Updates:  See patient's last visit with me on 2/20/2025  See H&P on 12/2/2022        Pathology:   Cancer Staging   No matching staging information was found for the patient.            Objective:     Vitals:  Blood pressure (!) 105/53, pulse 78, temperature 98.7 °F (37.1 °C), temperature source Temporal, resp. rate 18, height 5' 5" (1.651 m), weight 77.2 kg (170 lb 3.2 oz), SpO2 96%.    Physical Examination:   GEN: no apparent distress, comfortable; AAOx3  HEAD: atraumatic and normocephalic  EYES: no pallor, no icterus, PERRLA  ENT: OMM, no pharyngeal erythema, external ears WNL; no nasal discharge; no thrush  NECK: no masses, thyroid normal, trachea midline, no LAD/LN's, supple  CV: RRR with no murmur; normal pulse; normal S1 and S2; no pedal edema  CHEST: Normal respiratory effort; CTAB; normal breath sounds; no wheeze or crackles  ABDOM: nontender and nondistended; soft; normal bowel sounds; no rebound/guarding  MUSC/Skeletal: ROM normal; no crepitus; joints normal; no deformities or arthropathy  EXTREM: no clubbing, cyanosis, inflammation or swelling  SKIN: no rashes, lesions, ulcers, petechiae or subcutaneous nodules  : no koch  NEURO: grossly intact; motor/sensory WNL; AAOx3; no tremors  PSYCH: normal mood, affect and behavior  LYMPH: normal cervical, supraclavicular, axillary and groin LN's            Labs:     Lab Results   Component Value Date    WBC 5.40 05/20/2025    HGB 12.4 05/20/2025    HCT 37.6 05/20/2025    MCV 84 05/20/2025     05/20/2025       CMP  Sodium   Date Value Ref Range Status   05/20/2025 137 136 - 145 mmol/L Final     Potassium   Date Value Ref Range Status   05/20/2025 3.7 3.5 - 5.1 mmol/L Final     Chloride   Date Value Ref Range Status   05/20/2025 104 95 - 110 mmol/L Final     CO2   Date Value Ref Range " Status   05/20/2025 24 23 - 29 mmol/L Final     Glucose   Date Value Ref Range Status   05/20/2025 89 70 - 110 mg/dL Final     BUN   Date Value Ref Range Status   05/20/2025 11 6 - 20 mg/dL Final     Creatinine   Date Value Ref Range Status   05/20/2025 0.7 0.5 - 1.4 mg/dL Final     Calcium   Date Value Ref Range Status   05/20/2025 9.6 8.7 - 10.5 mg/dL Final     Protein Total   Date Value Ref Range Status   05/20/2025 7.9 6.0 - 8.4 gm/dL Final     Albumin   Date Value Ref Range Status   05/20/2025 4.4 3.5 - 5.2 g/dL Final     Bilirubin Total   Date Value Ref Range Status   05/20/2025 0.4 0.1 - 1.0 mg/dL Final     Comment:     For infants and newborns, interpretation of results should be based   on gestational age, weight and in agreement with clinical   observations.    Premature Infant recommended reference ranges:   0-24 hours:  <8.0 mg/dL   24-48 hours: <12.0 mg/dL   3-5 days:    <15.0 mg/dL   6-29 days:   <15.0 mg/dL     ALP   Date Value Ref Range Status   05/20/2025 83 55 - 135 unit/L Final     AST   Date Value Ref Range Status   05/20/2025 16 10 - 40 unit/L Final     ALT   Date Value Ref Range Status   05/20/2025 13 10 - 44 unit/L Final     Anion Gap   Date Value Ref Range Status   05/20/2025 9 8 - 16 mmol/L Final     eGFR   Date Value Ref Range Status   05/20/2025 >60 >60 mL/min/1.73/m2 Final   02/17/2025 >60.0 >60 mL/min/1.73 m^2 Final         Lab Results   Component Value Date    IRON 72 05/20/2025    TRANSFERRIN 284 05/20/2025    TIBC 398 05/20/2025    LABIRON 18 (L) 05/20/2025    FESATURATED 13 (L) 02/17/2025      Lab Results   Component Value Date    FERRITIN 10.6 (L) 05/20/2025              Hgb F                          0.0              %        MB     Reference Range: 0.0-2.0                                 Hgb A                          97.7             %        MB     Reference Range: 96.4-98.8                               Hgb A2                         2.3              %        MB     Reference  Range: 1.8-3.2                                 Hgb S                          0.0              %        MB     Reference Range: 0.0                                         Alpha-Thalassemia              Comment:                  TG     Test: Alpha-Thalassemia, DNA Analysis   Result:   Negative, alpha alpha/alpha alpha     Radiology/Diagnostic Studies:  US BREAST LEFT LIMITED     CLINICAL HISTORY:  Unspecified lump in the left breast, upper outer quadrant     TECHNIQUE:  CMS MANDATED QUALITY DATA - MAMMOGRAM - 225     [facility] utilizes a reminder system to ensure that all patients receive reminder letters and/or direct phone calls for appointments. This includes reminders for routine screening mammograms, diagnostic mammograms, and other breast imaging interventions when appropriate. This patient will be placed in the appropriate reminder system.     The interpretation was assisted by Computer Aided Detection with [employer].     COMPARISON:  None     FINDINGS:  Grayscale evaluation of the left breast 3-4 o'clock area of interest, 6 cm from the nipple was performed.  Of note, patient reports the palpable abnormality is intermittent, and does not feel it today.  Normal fibroglandular elements are evident in this region.  No suspicious cystic or solid mass.     Impression:     Negative targeted left breast ultrasound.     Clinical follow-up and screening mammography at age 40 recommended.     BI-RADS CATEGORY 1: NEGATIVE.    I have reviewed all available lab results and radiology reports.    Assessment/Plan:   (1) 24 y.o. female with diagnosis of iron deficiency anemia. She has been on oral iron for three months with only minimal improvements.   - orders placed for IV iron x 4  - recheck labs after 4 weeks and then follow up with Dr. Issa   - Thalessemia and hemoglobinopathy     1/11/2023:  - she is getting 4th IV iron today  - check labs monthly - will have done next week    3/8/2023:  -  s/p last IV iron was last week  - latest labs are good with hgb WNL; iron panel now WNL    6/15/2023:  - hgb, iron and ferritin remain wnl  - check labs again in 3 month  - encourage the use of oral iron    9/7/2023:  - latest hgb was 13.4  - encouraged her to resume oral iron  - latest iron 52 and ferritin 24    1/9/2024:  - latest CBC is adequate, with no anemia  - last iron infusion was in March 2023  - Iron sat 15%; ferritin 7.5  - discussed resumption of oral iron    5/21/2024:  - latest hgb WNL  - continued on oral iron but forgets top take it intermittently  - iron panel adequate except for low ferritin    8/20/2024:  - latest CBC adequate  - normal hgb  - iron panel adequate  - continued on oral iron    2/20/2025  - hgb up to 12.1  - iron is 49 but iron sat 13% and ferritn low at 7.8  - encouraged compliance with daily oral iron  - if level;s do not improve in next couple of months, then will consider resuming some IV iron    5/26/2025:  - latest CBC adequate with hgb WNL  - serum iron is WNL  - ferritin and %sat a little low but she does miss taking the oral iron on occasion and recently resumed       (2) Gastritis   - follow up with GI   - takes a probiotic daily      (3) Autism/OCD            VISIT DIAGNOSES:      Iron deficiency anemia, unspecified iron deficiency anemia type            PLAN:  1. Check labs every 3 months for now, encouarged daily use of iron, and resume IV iron as needed; encouraged her to take the oral iron  2. Encouraged use of oral iron  3.  F/u with GYN, PCP  4. Follow up with GI as needed         RTC in 3-4 months      Fax note to  Cristy Valentin FNP-C, and [place]    Discussion:     COVID-19 Discussion:    I had long discussion with patient and any applicable family about the COVID-19 coronavirus epidemic and the recommended precautions with regard to cancer and/or hematology patients. I have re-iterated the CDC recommendations for adequate hand washing, use of hand -like  products, and coughing into elbow, etc. In addition, especially for our patients who are on chemotherapy and/or our otherwise immunocompromised patients, I have recommended avoidance of crowds, including movie theaters, restaurants, churches, etc. I have recommended avoidance of any sick or symptomatic family members and/or friends. I have also recommended avoidance of any raw and unwashed food products, and general avoidance of food items that have not been prepared by themselves. The patient has been asked to call us immediately with any symptom developments, issues, questions or other general concerns.       Iron Infusion Therapy Discussion:     I provided literature/learning materials on the particular IV iron regimen and discussed the potential side-effect profiles of the drug(s). I discussed the importance of compliance with obtaining and monitoring requested lab work, and went over the potential risk for the development of anaphylactic shock, bronchospasm, dysrhythmia, liver and/or kidney damage, and respiratory/cardiovascular arrest and/or failure. I discussed the potential risks for development of alopecia, fevers, itching, chills and/or rigors, cold sensory issues, ringing in ears, vertigo and neuropathy, all of which are usually acute but sometimes could end up being chronic and life-long. I discussed the risks of hand-foot syndrome and rashes, and development of other autoimmune mediated processes such as pneumonitis and colitis which could be life threatening.     The patient's consent has been obtained to proceed with the IV iron therapy.The patient will be referred to [facility] for training and education on IV iron therapy, use of antiemetics and/or anti-diarrheals, use of NSAID's, potential IV iron therapy side-effects, and any specific recommendations and precautions with the particular IV iron agents.      I answered all of the patient's (and family's, if applicable)  questions to the best of my ability and to their complete satisfaction. The patient acknowledged full understanding of the risks, recommendations and plan(s).     I spent over 25 mins of time with the patient. Reviewed results of the recently ordered labs, tests and studies; made directives with regards to the results. Over half of this time was spent couseling and coordinating care.    I have explained all of the above in detail and the patient understands all of the current recommendation(s). I have answered all of their questions to the best of my ability and to their complete satisfaction.   The patient is to continue with the current management plan.            Electronically signed by Gustavo Issa MD                      Answers submitted by the patient for this visit:  Review of Systems Questionnaire (Submitted on 5/26/2025)  appetite change : No  unexpected weight change: No  mouth sores: No  visual disturbance: No  cough: Yes  shortness of breath: No  chest pain: No  abdominal pain: No  diarrhea: Yes  frequency: No  back pain: No  rash: No  headaches: No  adenopathy: No  nervous/ anxious: No

## 2025-08-21 ENCOUNTER — OFFICE VISIT (OUTPATIENT)
Dept: FAMILY MEDICINE | Facility: CLINIC | Age: 25
End: 2025-08-21
Payer: MEDICAID

## 2025-08-21 VITALS
TEMPERATURE: 99 F | DIASTOLIC BLOOD PRESSURE: 64 MMHG | WEIGHT: 167.13 LBS | OXYGEN SATURATION: 99 % | HEART RATE: 72 BPM | SYSTOLIC BLOOD PRESSURE: 120 MMHG | BODY MASS INDEX: 27.85 KG/M2 | HEIGHT: 65 IN

## 2025-08-21 DIAGNOSIS — Z01.419 ENCOUNTER FOR WELL WOMAN EXAM WITH ROUTINE GYNECOLOGICAL EXAM: ICD-10-CM

## 2025-08-21 DIAGNOSIS — F84.0 AUTISM: ICD-10-CM

## 2025-08-21 DIAGNOSIS — Z00.00 ANNUAL PHYSICAL EXAM: Primary | ICD-10-CM

## 2025-08-21 PROCEDURE — 99213 OFFICE O/P EST LOW 20 MIN: CPT | Mod: PBBFAC,PN | Performed by: NURSE PRACTITIONER

## 2025-08-21 PROCEDURE — 99999 PR PBB SHADOW E&M-EST. PATIENT-LVL III: CPT | Mod: PBBFAC,,, | Performed by: NURSE PRACTITIONER

## 2025-09-02 ENCOUNTER — LAB VISIT (OUTPATIENT)
Dept: LAB | Facility: HOSPITAL | Age: 25
End: 2025-09-02
Attending: INTERNAL MEDICINE
Payer: MEDICAID

## 2025-09-02 DIAGNOSIS — D50.9 IRON DEFICIENCY ANEMIA, UNSPECIFIED IRON DEFICIENCY ANEMIA TYPE: ICD-10-CM

## 2025-09-02 LAB
ABSOLUTE EOSINOPHIL (SMH): 0.47 K/UL
ABSOLUTE MONOCYTE (SMH): 0.22 K/UL (ref 0.3–1)
ABSOLUTE NEUTROPHIL COUNT (SMH): 1.3 K/UL (ref 1.8–7.7)
ALBUMIN SERPL-MCNC: 4.4 G/DL (ref 3.5–5.2)
ALP SERPL-CCNC: 69 UNIT/L (ref 55–135)
ALT SERPL-CCNC: 13 UNIT/L (ref 10–44)
ANION GAP (SMH): 6 MMOL/L (ref 8–16)
AST SERPL-CCNC: 16 UNIT/L (ref 10–40)
BASOPHILS # BLD AUTO: 0.04 K/UL
BASOPHILS NFR BLD AUTO: 1.1 %
BILIRUB SERPL-MCNC: 0.3 MG/DL (ref 0.1–1)
BUN SERPL-MCNC: 11 MG/DL (ref 6–20)
CALCIUM SERPL-MCNC: 9.7 MG/DL (ref 8.7–10.5)
CHLORIDE SERPL-SCNC: 104 MMOL/L (ref 95–110)
CO2 SERPL-SCNC: 27 MMOL/L (ref 23–29)
CREAT SERPL-MCNC: 0.6 MG/DL (ref 0.5–1.4)
ERYTHROCYTE [DISTWIDTH] IN BLOOD BY AUTOMATED COUNT: 13.5 % (ref 11.5–14.5)
FERRITIN SERPL-MCNC: 7.9 NG/ML (ref 20–300)
GFR SERPLBLD CREATININE-BSD FMLA CKD-EPI: >60 ML/MIN/1.73/M2
GLUCOSE SERPL-MCNC: 91 MG/DL (ref 70–110)
HCT VFR BLD AUTO: 38.4 % (ref 37–48.5)
HGB BLD-MCNC: 12.4 GM/DL (ref 12–16)
IMM GRANULOCYTES # BLD AUTO: 0.01 K/UL (ref 0–0.04)
IMM GRANULOCYTES NFR BLD AUTO: 0.3 % (ref 0–0.5)
IRON SATN MFR SERPL: 12 % (ref 20–50)
IRON SERPL-MCNC: 49 UG/DL (ref 30–160)
LYMPHOCYTES # BLD AUTO: 1.65 K/UL (ref 1–4.8)
MCH RBC QN AUTO: 28 PG (ref 27–31)
MCHC RBC AUTO-ENTMCNC: 32.3 G/DL (ref 32–36)
MCV RBC AUTO: 87 FL (ref 82–98)
NUCLEATED RBC (/100WBC) (SMH): 0 /100 WBC
PLATELET # BLD AUTO: 323 K/UL (ref 150–450)
PMV BLD AUTO: 10.4 FL (ref 9.2–12.9)
POTASSIUM SERPL-SCNC: 4.2 MMOL/L (ref 3.5–5.1)
PROT SERPL-MCNC: 7.5 GM/DL (ref 6–8.4)
RBC # BLD AUTO: 4.43 M/UL (ref 4–5.4)
RELATIVE EOSINOPHIL (SMH): 12.6 % (ref 0–8)
RELATIVE LYMPHOCYTE (SMH): 44.2 % (ref 18–48)
RELATIVE MONOCYTE (SMH): 5.9 % (ref 4–15)
RELATIVE NEUTROPHIL (SMH): 35.9 % (ref 38–73)
SODIUM SERPL-SCNC: 137 MMOL/L (ref 136–145)
TIBC SERPL-MCNC: 410 UG/DL (ref 250–450)
TRANSFERRIN SERPL-MCNC: 293 MG/DL (ref 200–375)
WBC # BLD AUTO: 3.73 K/UL (ref 3.9–12.7)

## 2025-09-02 PROCEDURE — 85025 COMPLETE CBC W/AUTO DIFF WBC: CPT

## 2025-09-02 PROCEDURE — 83540 ASSAY OF IRON: CPT

## 2025-09-02 PROCEDURE — 82728 ASSAY OF FERRITIN: CPT

## 2025-09-02 PROCEDURE — 36415 COLL VENOUS BLD VENIPUNCTURE: CPT

## 2025-09-02 PROCEDURE — 84460 ALANINE AMINO (ALT) (SGPT): CPT
